# Patient Record
Sex: FEMALE | Race: WHITE | Employment: FULL TIME | URBAN - METROPOLITAN AREA
[De-identification: names, ages, dates, MRNs, and addresses within clinical notes are randomized per-mention and may not be internally consistent; named-entity substitution may affect disease eponyms.]

---

## 2017-05-30 ENCOUNTER — HOSPITAL ENCOUNTER (OUTPATIENT)
Dept: MAMMOGRAPHY | Age: 52
Discharge: HOME | End: 2017-05-30
Attending: OBSTETRICS & GYNECOLOGY
Payer: COMMERCIAL

## 2017-05-30 DIAGNOSIS — Z12.31 VISIT FOR SCREENING MAMMOGRAM: ICD-10-CM

## 2017-05-30 PROCEDURE — 77067 SCR MAMMO BI INCL CAD: CPT | Performed by: OBSTETRICS & GYNECOLOGY

## 2018-06-19 ENCOUNTER — HOSPITAL ENCOUNTER (OUTPATIENT)
Dept: MAMMOGRAPHY | Facility: HOSPITAL | Age: 53
Discharge: HOME | End: 2018-06-19
Attending: OBSTETRICS & GYNECOLOGY
Payer: COMMERCIAL

## 2018-06-19 DIAGNOSIS — N64.4 BREAST PAIN: ICD-10-CM

## 2018-06-19 PROCEDURE — 77062 BREAST TOMOSYNTHESIS BI: CPT | Performed by: OBSTETRICS & GYNECOLOGY

## 2018-06-19 PROCEDURE — 77066 DX MAMMO INCL CAD BI: CPT | Performed by: OBSTETRICS & GYNECOLOGY

## 2018-06-19 PROCEDURE — 76641 ULTRASOUND BREAST COMPLETE: CPT | Performed by: OBSTETRICS & GYNECOLOGY

## 2018-08-25 ENCOUNTER — OFFICE VISIT (OUTPATIENT)
Dept: FAMILY MEDICINE CLINIC | Facility: CLINIC | Age: 53
End: 2018-08-25
Payer: COMMERCIAL

## 2018-08-25 VITALS
OXYGEN SATURATION: 99 % | WEIGHT: 150 LBS | SYSTOLIC BLOOD PRESSURE: 94 MMHG | BODY MASS INDEX: 21.47 KG/M2 | DIASTOLIC BLOOD PRESSURE: 62 MMHG | HEART RATE: 75 BPM | RESPIRATION RATE: 14 BRPM | TEMPERATURE: 99 F | HEIGHT: 70 IN

## 2018-08-25 DIAGNOSIS — J32.0 MAXILLARY SINUSITIS, UNSPECIFIED CHRONICITY: Primary | ICD-10-CM

## 2018-08-25 PROCEDURE — 99213 OFFICE O/P EST LOW 20 MIN: CPT | Performed by: NURSE PRACTITIONER

## 2018-08-25 RX ORDER — AMOXICILLIN AND CLAVULANATE POTASSIUM 875; 125 MG/1; MG/1
1 TABLET, FILM COATED ORAL 2 TIMES DAILY
Qty: 14 TABLET | Refills: 0 | Status: SHIPPED | OUTPATIENT
Start: 2018-08-25 | End: 2018-09-01

## 2018-08-25 NOTE — PROGRESS NOTES
CHIEF COMPLAINT:   Patient presents with:  Sinus Problem      HPI:   Adelfo Akins is a 48year old female who presents with sinus symptoms for 5 weeks. Patient was treated for a sinus infection 1 month ago with a 7 day course Augmentin.  Symptoms imp Multiple Vitamins-Minerals (MULTI FOR HER) Oral Cap 1 tab daily Disp:  Rfl:    Calcium + D 600-200 MG-UNIT Oral Tab 1 TABLET TWICE DAILY WITH FOOD Disp:  Rfl:    Cholecalciferol (VITAMIN D) 1000 UNIT Oral Cap 1 CAPSULE DAILY Disp:  Rfl:       Past Medical EYES: conjunctiva clear, EOM intact, normal pupils, PERRLA  EARS: Canals are clear, TM's are pearly white and intact, no bulging, bony landmarks are visible, fluid is present behind both TMs.   NOSE: nostrils patent, purulent nasal mucus present, erythemato Sinusitis (Antibiotic Treatment)    The sinuses are air-filled spaces within the bones of the face. They connect to the inside of the nose. Sinusitis is an inflammation of the tissue that lines the sinuses. Sinusitis can occur during a cold.  It can also ha · Do not use nasal rinses or irrigation during an acute sinus infection, unless your healthcare provider tells you to. Rinsing may spread the infection to other areas in your sinuses.   · Use acetaminophen or ibuprofen to control pain, unless another pain m

## 2018-08-28 ENCOUNTER — TELEPHONE (OUTPATIENT)
Dept: FAMILY MEDICINE CLINIC | Facility: CLINIC | Age: 53
End: 2018-08-28

## 2018-08-28 RX ORDER — METHYLPREDNISOLONE 4 MG/1
TABLET ORAL
Qty: 1 KIT | Refills: 0 | Status: SHIPPED | OUTPATIENT
Start: 2018-08-28 | End: 2018-09-13 | Stop reason: ALTCHOICE

## 2018-08-28 NOTE — TELEPHONE ENCOUNTER
Returned patient call. She was recently seen for sinusitis symptoms 8/25/18 and started on augmentin. She feels she is not improving. She is also using flonase, mucinex, saline spray. No fevers.   Complains of sinus pressure facial discomfort and a  Lot

## 2018-08-31 ENCOUNTER — OFFICE VISIT (OUTPATIENT)
Dept: FAMILY MEDICINE CLINIC | Facility: CLINIC | Age: 53
End: 2018-08-31
Payer: COMMERCIAL

## 2018-08-31 VITALS
BODY MASS INDEX: 22.22 KG/M2 | DIASTOLIC BLOOD PRESSURE: 60 MMHG | RESPIRATION RATE: 18 BRPM | HEART RATE: 57 BPM | WEIGHT: 150 LBS | HEIGHT: 69 IN | SYSTOLIC BLOOD PRESSURE: 92 MMHG | TEMPERATURE: 98 F | OXYGEN SATURATION: 99 %

## 2018-08-31 DIAGNOSIS — J01.00 ACUTE NON-RECURRENT MAXILLARY SINUSITIS: Primary | ICD-10-CM

## 2018-08-31 DIAGNOSIS — J45.20 MILD INTERMITTENT ASTHMA WITHOUT COMPLICATION: ICD-10-CM

## 2018-08-31 DIAGNOSIS — J30.1 SEASONAL ALLERGIC RHINITIS DUE TO POLLEN: ICD-10-CM

## 2018-08-31 PROCEDURE — 99203 OFFICE O/P NEW LOW 30 MIN: CPT | Performed by: FAMILY MEDICINE

## 2018-08-31 RX ORDER — MOMETASONE 50 UG/1
SPRAY, METERED NASAL
Qty: 3 BOTTLE | Refills: 1 | Status: SHIPPED | OUTPATIENT
Start: 2018-08-31 | End: 2020-03-13 | Stop reason: ALTCHOICE

## 2018-08-31 RX ORDER — METHYLPREDNISOLONE 4 MG/1
TABLET ORAL
Qty: 21 TABLET | Refills: 0 | Status: SHIPPED | OUTPATIENT
Start: 2018-08-31 | End: 2018-09-13 | Stop reason: ALTCHOICE

## 2018-08-31 RX ORDER — ALBUTEROL SULFATE 90 UG/1
AEROSOL, METERED RESPIRATORY (INHALATION)
Qty: 3 INHALER | Refills: 1 | Status: SHIPPED | OUTPATIENT
Start: 2018-08-31 | End: 2020-03-10

## 2018-08-31 RX ORDER — MOMETASONE 50 UG/1
SPRAY, METERED NASAL
Qty: 3 BOTTLE | Refills: 1 | Status: SHIPPED | OUTPATIENT
Start: 2018-08-31 | End: 2018-08-31

## 2018-08-31 RX ORDER — METHYLPREDNISOLONE 4 MG/1
TABLET ORAL
Qty: 21 TABLET | Refills: 0 | Status: SHIPPED | OUTPATIENT
Start: 2018-08-31 | End: 2018-08-31

## 2018-08-31 NOTE — PROGRESS NOTES
CHIEF COMPLAINT: Patient presents with:  Establish Care  Sinus Problem: x6 weeks    HPI:     Anne Jimenez is a 48year old female presents for establish care and sinus pain L>R maxillary sinus pain x 6-8 weeks. Denies nasal obstruction.  Treated with visit):    Current Outpatient Prescriptions:  Albuterol Sulfate HFA (PROAIR HFA) 108 (90 Base) MCG/ACT Inhalation Aero Soln USE 2 INHALATIONS INTO THE LUNGS EVERY 4 HOURS AS NEEDED FOR WHEEZING (OFFICE VISIT NEEDED FOR FUTURE REFILLS) Disp: 3 Inhaler Rfl: drainage  THROAT: clear, without exudates, redness noted, cobblestoning , post nasal drip, uvula midline and airway patent  LUNGS: clear to auscultation bilaterally; no rales, rhonchi, or wheezes and good inspiratory and expiratory effort    LABS     No vi Take as directed with food and water           Health Maintenance:  Annual Physical due on 05/17/1969  Annual Depression Screen due on 05/17/1979  Pap Smear,3 Years due on 05/17/1998  Asthma Action Plan due on 06/01/2016  Asthma Control Test due on 07/30/2

## 2018-08-31 NOTE — PATIENT INSTRUCTIONS
· Rinse nasal passages with Neti pot every morning.   Follow directions to positive properly clean  · allergy home modifications discussed-shower nightly, keep windows closed, consider hepa filter for bedroom, keep pets out of bedroom, dust/vacuum bedroom 2 Keep windows closed, especially when pollen counts are high, and use air conditioning instead. Constant exposure to allergens means constant allergy symptoms. That’s why it's important to control or avoid the allergens that cause your symptoms.  If you ar People have allergies only when the pollen to which they are allergic is in the air. Each plant pollinates more or less the same from year to year.  Exactly when a plant starts to pollinate seems to depend on geographical location—rather than on the weather People with asthma have very sensitive airways. This means the airways react to certain things called triggers (such as pollen, dust, or smoke) and become swollen and narrowed. Inflammation makes the airways swollen and narrowed.  This is a long-lasting (ch · Peak flow less than 50% of your personal best, if you use peak flow monitoring  Asthma is a long-term condition. So it’s important to work with your healthcare provider to manage it. If you smoke, get help to quit.  Know your triggers and figure out how t · If you can't avoid having a pet, keep it out of your bedroom and off upholstered furniture. Pollen:  · When pollen counts are high, keep windows of your car and home closed. If possible, use an air conditioner instead.   · Wear a filter mask when mowing

## 2018-09-13 ENCOUNTER — OFFICE VISIT (OUTPATIENT)
Dept: FAMILY MEDICINE CLINIC | Facility: CLINIC | Age: 53
End: 2018-09-13
Payer: COMMERCIAL

## 2018-09-13 VITALS
HEART RATE: 68 BPM | WEIGHT: 156.19 LBS | DIASTOLIC BLOOD PRESSURE: 64 MMHG | BODY MASS INDEX: 23 KG/M2 | TEMPERATURE: 98 F | SYSTOLIC BLOOD PRESSURE: 90 MMHG | OXYGEN SATURATION: 98 % | RESPIRATION RATE: 18 BRPM

## 2018-09-13 DIAGNOSIS — J30.1 SEASONAL ALLERGIC RHINITIS DUE TO POLLEN: ICD-10-CM

## 2018-09-13 DIAGNOSIS — R14.0 ABDOMINAL BLOATING: Primary | ICD-10-CM

## 2018-09-13 DIAGNOSIS — J45.20 MILD INTERMITTENT ASTHMA WITHOUT COMPLICATION: ICD-10-CM

## 2018-09-13 PROBLEM — J34.89 SINUS PRESSURE: Status: ACTIVE | Noted: 2018-09-13

## 2018-09-13 PROCEDURE — 99214 OFFICE O/P EST MOD 30 MIN: CPT | Performed by: FAMILY MEDICINE

## 2018-09-13 RX ORDER — CETIRIZINE HYDROCHLORIDE 10 MG/1
10 TABLET ORAL DAILY
COMMUNITY
End: 2018-11-23 | Stop reason: ALTCHOICE

## 2018-09-13 NOTE — PROGRESS NOTES
CHIEF COMPLAINT: Patient presents with: Follow - Up: sinus pressure    HPI:     Violet Ramires is a 48year old female presents for follow-up sinusitis.   Completed Biaxin and second course of steroids and has no postnasal drip or runny nose or sneezi Yes      Comment: Socially    Drug use: No       Medications (Active prior to today's visit):    Current Outpatient Medications:  Budesonide (PULMICORT FLEXHALER) 180 MCG/ACT Inhalation Aerosol Powder, Breath Activated Inhale 2 puffs into the lungs 2 (two) clear drainage  THROAT: clear, without exudates, redness noted, cobblestoning , post nasal drip, uvula midline and airway patent  LUNGS: clear to auscultation bilaterally; no rales, rhonchi, or wheezes and good inspiratory and expiratory effort  Abdomen: S 2 (two) times daily.        Health Maintenance:  Annual Physical due on 05/17/1969  Annual Depression Screen due on 05/17/1979  Pap Smear,3 Years due on 05/17/1998  Asthma Action Plan due on 06/01/2016  Asthma Control Test due on 07/30/2016  Influenza Kenton Villanueva

## 2018-09-13 NOTE — PATIENT INSTRUCTIONS
Constipation   · Use milk of magnesia-cherry flavored  30cc one to two  times daily until regular BM  · Use otc citrucel 1-2x daily as directed on bottle.    · If fever, vomiting, worse abdominal or back pain, go to the emergency room immediately  · Pt Drink plenty of water when you increase the amount of fiber you eat. Follow Up  with your doctor or return to this facility if symptoms do not improve in the next few days.  You may require further tests or a referral to a specialist.  Get [facility] A Whole-grain breads and cereals. Try to eat 6–8 ounces a day. Include wheat and oat bran cereals, whole-wheat muffins or toast, and corn tortillas in your meals. Fruits. Try to eat 2 cups a day.  Apples, oranges, strawberries, pears, and bananas are good so

## 2018-11-10 ENCOUNTER — OFFICE VISIT (OUTPATIENT)
Dept: FAMILY MEDICINE CLINIC | Facility: CLINIC | Age: 53
End: 2018-11-10
Payer: COMMERCIAL

## 2018-11-10 DIAGNOSIS — J02.9 PHARYNGITIS, UNSPECIFIED ETIOLOGY: ICD-10-CM

## 2018-11-10 DIAGNOSIS — J01.00 ACUTE NON-RECURRENT MAXILLARY SINUSITIS: Primary | ICD-10-CM

## 2018-11-10 PROCEDURE — 87880 STREP A ASSAY W/OPTIC: CPT | Performed by: NURSE PRACTITIONER

## 2018-11-10 PROCEDURE — 99213 OFFICE O/P EST LOW 20 MIN: CPT | Performed by: NURSE PRACTITIONER

## 2018-11-10 RX ORDER — AMOXICILLIN AND CLAVULANATE POTASSIUM 875; 125 MG/1; MG/1
1 TABLET, FILM COATED ORAL 2 TIMES DAILY
Qty: 20 TABLET | Refills: 0 | Status: SHIPPED | OUTPATIENT
Start: 2018-11-10 | End: 2018-11-20

## 2018-11-10 NOTE — PATIENT INSTRUCTIONS
Cepacol lozenges with Benzocaine 15 mg + Methol can numb your throat temporarily. Sinusitis (Antibiotic Treatment)    The sinuses are air-filled spaces within the bones of the face.  They connect to the inside of the nose. Sinusitis is an inflammation · Over-the-counter antihistamines may help if allergies contributed to your sinusitis.    · Do not use nasal rinses or irrigation during an acute sinus infection, unless your healthcare provider tells you to.  Rinsing may spread the infection to other areas Sore throats happen for many reasons, such as colds, allergies, and infections caused by viruses or bacteria. In any case, your throat becomes red and sore.  Your goal for self-care is to reduce your discomfort while giving your throat a chance to heal.  Mo · A temperature over 101°F (38.3°C)  · White spots on the throat  · Great difficulty swallowing  · Trouble breathing  · A skin rash  · Recent exposure to someone else with strep bacteria  · Severe hoarseness and swollen glands in the neck or jaw   Date Las · Remember: unless a sore throat is caused by a bacterial infection, antibiotics won’t help you. Prevent future sore throats  Prevention tips include the following:  · Stop smoking or reduce contact with secondhand smoke.  Smoke irritates the tender throat

## 2018-11-12 VITALS
SYSTOLIC BLOOD PRESSURE: 108 MMHG | HEART RATE: 84 BPM | TEMPERATURE: 99 F | RESPIRATION RATE: 16 BRPM | DIASTOLIC BLOOD PRESSURE: 68 MMHG

## 2018-11-12 NOTE — PROGRESS NOTES
CHIEF COMPLAINT:   \" Patient presents with:  Sore Throat  Sinus Problem    HPI:   Jorgito Renee is a 48year old female who presents with a hx of a sore throat on and off for 2 weeks and cold sx which progressed to Frontal and Maxillary sinus conges vein treatment   • TONSILLECTOMY        Family History   Problem Relation Age of Onset   • Breast Cancer Paternal Aunt 48   • Diabetes Maternal Grandmother    • Colon Cancer Maternal Grandmother    • Hypertension Father    • Prostate Cancer Father 1. Acute non-recurrent maxillary sinusitis    - Amoxicillin-Pot Clavulanate 875-125 MG Oral Tab; Take 1 tablet by mouth 2 (two) times daily for 10 days. Dispense: 20 tablet; Refill: 0    2.  Pharyngitis, unspecified etiology    Patient wanted to be [place]

## 2018-11-23 ENCOUNTER — OFFICE VISIT (OUTPATIENT)
Dept: FAMILY MEDICINE CLINIC | Facility: CLINIC | Age: 53
End: 2018-11-23
Payer: COMMERCIAL

## 2018-11-23 VITALS
TEMPERATURE: 98 F | OXYGEN SATURATION: 99 % | HEART RATE: 68 BPM | SYSTOLIC BLOOD PRESSURE: 108 MMHG | RESPIRATION RATE: 16 BRPM | DIASTOLIC BLOOD PRESSURE: 70 MMHG

## 2018-11-23 DIAGNOSIS — B02.9 HERPES ZOSTER WITHOUT COMPLICATION: Primary | ICD-10-CM

## 2018-11-23 PROCEDURE — 99213 OFFICE O/P EST LOW 20 MIN: CPT | Performed by: PHYSICIAN ASSISTANT

## 2018-11-23 RX ORDER — FEXOFENADINE HCL 180 MG/1
180 TABLET ORAL DAILY
COMMUNITY
End: 2021-05-14

## 2018-11-23 RX ORDER — VALACYCLOVIR HYDROCHLORIDE 1 G/1
1 TABLET, FILM COATED ORAL 3 TIMES DAILY
Qty: 21 TABLET | Refills: 0 | Status: SHIPPED | OUTPATIENT
Start: 2018-11-23 | End: 2018-11-30

## 2018-11-23 NOTE — PROGRESS NOTES
CHIEF COMPLAINT:   Patient presents with:  Rash: on belly. HPI:     Luis Pearson is a 48year old female who presents for evaluation of a rash. She noted a rash above her belly button area about a week ago.   She says the skin feels irrita Norbert's node 2005    neck      Past Surgical History:   Procedure Laterality Date   • DENTAL SURGERY PROCEDURE  11/1/08    Implants   • OTHER      vein treatment   • TONSILLECTOMY        Family History   Problem Relation Age of Onset   • Breast Cancer Pa Rash in the midline superior to umbilicus. Similar rash left mid flank. Lesions non vesicular and dry without drainage. No right sided lesions. Skin is sensitive to light touch along left T9 dermatome.        No results found for this or any previous

## 2019-01-20 ENCOUNTER — PATIENT OUTREACH (OUTPATIENT)
Dept: FAMILY MEDICINE CLINIC | Facility: CLINIC | Age: 54
End: 2019-01-20

## 2019-07-18 ENCOUNTER — HOSPITAL ENCOUNTER (OUTPATIENT)
Dept: MAMMOGRAPHY | Facility: HOSPITAL | Age: 54
Discharge: HOME | End: 2019-07-18
Attending: OBSTETRICS & GYNECOLOGY
Payer: COMMERCIAL

## 2019-07-18 DIAGNOSIS — N64.4 BREAST PAIN, LEFT: ICD-10-CM

## 2019-07-18 DIAGNOSIS — R92.2 DENSE BREASTS: ICD-10-CM

## 2019-07-18 PROCEDURE — 77062 BREAST TOMOSYNTHESIS BI: CPT | Performed by: OBSTETRICS & GYNECOLOGY

## 2019-07-18 PROCEDURE — 76642 ULTRASOUND BREAST LIMITED: CPT | Performed by: OBSTETRICS & GYNECOLOGY

## 2019-07-18 PROCEDURE — 77066 DX MAMMO INCL CAD BI: CPT | Performed by: OBSTETRICS & GYNECOLOGY

## 2019-09-07 ENCOUNTER — OFFICE VISIT (OUTPATIENT)
Dept: FAMILY MEDICINE CLINIC | Facility: CLINIC | Age: 54
End: 2019-09-07
Payer: COMMERCIAL

## 2019-09-07 VITALS
RESPIRATION RATE: 16 BRPM | WEIGHT: 152 LBS | HEART RATE: 71 BPM | OXYGEN SATURATION: 99 % | BODY MASS INDEX: 21.76 KG/M2 | HEIGHT: 70 IN | SYSTOLIC BLOOD PRESSURE: 100 MMHG | DIASTOLIC BLOOD PRESSURE: 78 MMHG | TEMPERATURE: 98 F

## 2019-09-07 DIAGNOSIS — J01.40 ACUTE NON-RECURRENT PANSINUSITIS: Primary | ICD-10-CM

## 2019-09-07 PROCEDURE — 99213 OFFICE O/P EST LOW 20 MIN: CPT | Performed by: PHYSICIAN ASSISTANT

## 2019-09-07 RX ORDER — AMOXICILLIN AND CLAVULANATE POTASSIUM 875; 125 MG/1; MG/1
1 TABLET, FILM COATED ORAL 2 TIMES DAILY
Qty: 20 TABLET | Refills: 0 | Status: SHIPPED | OUTPATIENT
Start: 2019-09-07 | End: 2019-11-04 | Stop reason: ALTCHOICE

## 2019-09-07 RX ORDER — METHYLPREDNISOLONE 4 MG/1
TABLET ORAL
Qty: 1 KIT | Refills: 0 | Status: SHIPPED | OUTPATIENT
Start: 2019-09-07 | End: 2019-11-04 | Stop reason: ALTCHOICE

## 2019-09-07 RX ORDER — FLUTICASONE PROPIONATE 50 MCG
2 SPRAY, SUSPENSION (ML) NASAL DAILY
Qty: 1 INHALER | Refills: 0 | Status: SHIPPED | OUTPATIENT
Start: 2019-09-07 | End: 2020-10-30

## 2019-09-07 NOTE — PROGRESS NOTES
CHIEF COMPLAINT:   Patient presents with:  Sinusitis      HPI:   Tanya Blackwood is a 48year old female who presents for cold symptoms for  7-8  days.  She felt as thought she was improving and then suddenly since yesterday developed worsening sinus sy • Schmorl's node 2005    neck      Past Surgical History:   Procedure Laterality Date   • DENTAL SURGERY PROCEDURE  11/1/08    Implants   • OTHER      vein treatment   • TONSILLECTOMY        Family History   Problem Relation Age of Onset   • Breast Cancer LYMPH:  No gross lymphadenopathy. No results found for this or any previous visit (from the past 24 hour(s)). ASSESSMENT AND PLAN:   Sotero Bruno is a 48year old female who presents with Sinusitis.  Symptoms are consistent with:      ASSESSM The symptoms of ABRS may be different for each person, and can include:  · Nasal congestion  · Runny nose  · Fluid draining from the nose down the throat (postnasal drip)  · Headache  · Cough  · Pain in the sinuses  · Thick, colored fluid from the nose (mu The patient indicates understanding of these issues and agrees to the plan. The patient is asked to follow up with PCP if sx's persist or worsen.

## 2019-09-29 ENCOUNTER — TELEPHONE (OUTPATIENT)
Dept: FAMILY MEDICINE CLINIC | Facility: CLINIC | Age: 54
End: 2019-09-29

## 2019-09-29 NOTE — TELEPHONE ENCOUNTER
Patient call inquiring about allergy medications. Seen for sinus issues 9/7/19 and better after augmentin/medrol but still underlying allergies and sinus symptoms. Taking allegra, flonase currently.   Advise trying allegra d rather than plain allegra for

## 2019-10-21 ENCOUNTER — IMMUNIZATION (OUTPATIENT)
Dept: FAMILY MEDICINE CLINIC | Facility: CLINIC | Age: 54
End: 2019-10-21
Payer: COMMERCIAL

## 2019-10-21 DIAGNOSIS — Z23 NEED FOR VACCINATION: ICD-10-CM

## 2019-10-21 PROCEDURE — 90471 IMMUNIZATION ADMIN: CPT | Performed by: PHYSICIAN ASSISTANT

## 2019-10-21 PROCEDURE — 90686 IIV4 VACC NO PRSV 0.5 ML IM: CPT | Performed by: PHYSICIAN ASSISTANT

## 2019-11-04 ENCOUNTER — APPOINTMENT (OUTPATIENT)
Dept: GENERAL RADIOLOGY | Facility: HOSPITAL | Age: 54
End: 2019-11-04
Attending: EMERGENCY MEDICINE
Payer: COMMERCIAL

## 2019-11-04 ENCOUNTER — HOSPITAL ENCOUNTER (EMERGENCY)
Facility: HOSPITAL | Age: 54
Discharge: HOME | End: 2019-11-04
Attending: EMERGENCY MEDICINE
Payer: COMMERCIAL

## 2019-11-04 VITALS
HEIGHT: 70 IN | RESPIRATION RATE: 18 BRPM | BODY MASS INDEX: 21.19 KG/M2 | OXYGEN SATURATION: 96 % | TEMPERATURE: 98 F | SYSTOLIC BLOOD PRESSURE: 126 MMHG | DIASTOLIC BLOOD PRESSURE: 65 MMHG | WEIGHT: 148 LBS | HEART RATE: 79 BPM

## 2019-11-04 DIAGNOSIS — S63.259A DISLOCATION OF FINGER, INITIAL ENCOUNTER: Primary | ICD-10-CM

## 2019-11-04 PROCEDURE — 99283 EMERGENCY DEPT VISIT LOW MDM: CPT

## 2019-11-04 PROCEDURE — 26770 TREAT FINGER DISLOCATION: CPT

## 2019-11-04 PROCEDURE — 73130 X-RAY EXAM OF HAND: CPT | Performed by: EMERGENCY MEDICINE

## 2019-11-04 PROCEDURE — 73140 X-RAY EXAM OF FINGER(S): CPT | Performed by: EMERGENCY MEDICINE

## 2019-11-04 RX ORDER — ACETAMINOPHEN 500 MG
1000 TABLET ORAL ONCE
Status: COMPLETED | OUTPATIENT
Start: 2019-11-04 | End: 2019-11-04

## 2019-11-04 NOTE — ED NOTES
Ice pack applied upon arrival. Pt was able to get her rings removed from that finger on her own upon arrival.

## 2019-11-04 NOTE — ED INITIAL ASSESSMENT (HPI)
Pt had leash pull on her ring finger, distal tip is deformed. 5/10 pain. No bleeding noted.  Capillary refill<3 sec

## 2019-11-04 NOTE — ED PROVIDER NOTES
Patient Seen in: [facility] Emergency Department      History   Patient presents with:  Upper Extremity Injury (musculoskeletal)    Stated Complaint: finger injury    HPI    Patient is a 26-year-old right-hand-dominant female who presents emergency is nontoxic in appearance. Chivo Puffer EXTREMITIES: There is no cyanosis, clubbing, or edema appreciated. Pulses are 2+ and equal in both upper extremities. There is tenderness and deformity over the right hand fourth digit in the DIP joint only.   There is no other DO  [address] [facility]  [phone]    Call in 2 days  please call for follow up this week        Medications Prescribed:  Current Discharge Medication List

## 2019-12-20 ENCOUNTER — OFFICE VISIT (OUTPATIENT)
Dept: FAMILY MEDICINE CLINIC | Facility: CLINIC | Age: 54
End: 2019-12-20
Payer: COMMERCIAL

## 2019-12-20 VITALS
WEIGHT: 147.63 LBS | HEIGHT: 69 IN | BODY MASS INDEX: 21.86 KG/M2 | SYSTOLIC BLOOD PRESSURE: 102 MMHG | DIASTOLIC BLOOD PRESSURE: 64 MMHG | RESPIRATION RATE: 18 BRPM | HEART RATE: 65 BPM | TEMPERATURE: 98 F | OXYGEN SATURATION: 99 %

## 2019-12-20 DIAGNOSIS — J01.00 ACUTE MAXILLARY SINUSITIS, RECURRENCE NOT SPECIFIED: Primary | ICD-10-CM

## 2019-12-20 PROCEDURE — 99214 OFFICE O/P EST MOD 30 MIN: CPT | Performed by: FAMILY MEDICINE

## 2019-12-20 RX ORDER — CETIRIZINE HYDROCHLORIDE 10 MG/1
TABLET ORAL
COMMUNITY
End: 2020-07-21 | Stop reason: ALTCHOICE

## 2019-12-20 RX ORDER — METHYLPREDNISOLONE 4 MG/1
TABLET ORAL
Qty: 1 KIT | Refills: 0 | Status: SHIPPED | OUTPATIENT
Start: 2019-12-20 | End: 2020-03-13 | Stop reason: ALTCHOICE

## 2019-12-20 RX ORDER — AMOXICILLIN AND CLAVULANATE POTASSIUM 875; 125 MG/1; MG/1
1 TABLET, FILM COATED ORAL 2 TIMES DAILY
Qty: 14 TABLET | Refills: 0 | Status: SHIPPED | OUTPATIENT
Start: 2019-12-20 | End: 2019-12-27

## 2019-12-20 NOTE — PROGRESS NOTES
CHIEF COMPLAINT: Patient presents with:  Sinus Problem: face pressure, cough, drainage        HPI:     Anne Jimenez is a 48year old female presents for sinus problems.     Lisa Rodriguez is a 47 yo F with PMH of asthma p/w cough and sinus problems x 7 days No       Medications (Active prior to today's visit):  Current Outpatient Medications   Medication Sig Dispense Refill   • cetirizine 10 MG Oral Tab Take by mouth. • methylPREDNISolone (MEDROL) 4 MG Oral Tablet Therapy Pack As directed.  1 kit 0   • [place] °C) (Oral)   Resp 18   Ht 69\"   Wt 147 lb 9.6 oz (67 kg)   SpO2 99%   BMI 21.80 kg/m²   Vital signs reviewed. Appears stated age, well groomed. Physical Exam   Vitals reviewed. Constitutional: She is oriented to person, place, and time.  She appears well Dr. Marzena Keller.     - methylPREDNISolone (MEDROL) 4 MG Oral Tablet Therapy Pack; As directed. Dispense: 1 kit; Refill: 0  - Amoxicillin-Pot Clavulanate 875-125 MG Oral Tab; Take 1 tablet by mouth 2 (two) times daily for 7 days. Dispense: 14 tablet;  Refill:

## 2019-12-20 NOTE — PATIENT INSTRUCTIONS
As of October 6th 2014, the Drug Enforcement Agency [facility]) is reclassifying all hydrocodone combination medications from Schedule III to Schedule II. This includes medications such as Norco, Vicodin, Lortab, Zohydro, and Vicoprofen.      What this means for often be managed with self-care. Self-care can keep sinuses moist and make you feel more comfortable. Remember to follow your doctor's instructions closely. This can make a big difference in getting your sinus problem under control.   Drink fluids  Drinking

## 2020-03-10 RX ORDER — BUDESONIDE 180 UG/1
AEROSOL, POWDER RESPIRATORY (INHALATION)
Qty: 1 EACH | Refills: 11 | OUTPATIENT
Start: 2020-03-10

## 2020-03-10 RX ORDER — ALBUTEROL SULFATE 90 UG/1
AEROSOL, METERED RESPIRATORY (INHALATION)
Qty: 3 INHALER | Refills: 0 | Status: SHIPPED | OUTPATIENT
Start: 2020-03-10 | End: 2020-03-13

## 2020-03-10 NOTE — TELEPHONE ENCOUNTER
Patient returning call. Patient schedule appt for Friday 3/13/2020. Advised patient 90 day refill approved for Pulmicort and albuterol inhaler as a one-time exception only. Patient is due for labs/testing and/or appointment.  I explained to patient that

## 2020-03-10 NOTE — TELEPHONE ENCOUNTER
Pt requesting refill of   Requested Prescriptions     Pending Prescriptions Disp Refills   • PULMICORT FLEXHALER 180 MCG/ACT Inhalation Aerosol Powder, Breath Activated [Pharmacy Med Name: Ashwin Campbell PWD/INH 180MCG] 1 each 11     Sig: USE 2 INHALA

## 2020-03-13 ENCOUNTER — OFFICE VISIT (OUTPATIENT)
Dept: FAMILY MEDICINE CLINIC | Facility: CLINIC | Age: 55
End: 2020-03-13
Payer: COMMERCIAL

## 2020-03-13 VITALS
SYSTOLIC BLOOD PRESSURE: 100 MMHG | BODY MASS INDEX: 21.92 KG/M2 | WEIGHT: 148 LBS | DIASTOLIC BLOOD PRESSURE: 66 MMHG | HEART RATE: 61 BPM | RESPIRATION RATE: 18 BRPM | HEIGHT: 69 IN | TEMPERATURE: 98 F | OXYGEN SATURATION: 97 %

## 2020-03-13 DIAGNOSIS — Z13.0 SCREENING FOR ENDOCRINE, NUTRITIONAL, METABOLIC AND IMMUNITY DISORDER: ICD-10-CM

## 2020-03-13 DIAGNOSIS — Z13.6 SCREENING FOR HEART DISEASE: ICD-10-CM

## 2020-03-13 DIAGNOSIS — Z13.21 SCREENING FOR ENDOCRINE, NUTRITIONAL, METABOLIC AND IMMUNITY DISORDER: ICD-10-CM

## 2020-03-13 DIAGNOSIS — Z13.0 SCREENING, IRON DEFICIENCY ANEMIA: ICD-10-CM

## 2020-03-13 DIAGNOSIS — J45.20 MILD INTERMITTENT ASTHMA WITHOUT COMPLICATION: ICD-10-CM

## 2020-03-13 DIAGNOSIS — Z13.29 SCREENING FOR ENDOCRINE, NUTRITIONAL, METABOLIC AND IMMUNITY DISORDER: ICD-10-CM

## 2020-03-13 DIAGNOSIS — Z12.39 SCREENING FOR BREAST CANCER: ICD-10-CM

## 2020-03-13 DIAGNOSIS — Z13.228 SCREENING FOR ENDOCRINE, NUTRITIONAL, METABOLIC AND IMMUNITY DISORDER: ICD-10-CM

## 2020-03-13 DIAGNOSIS — Z00.00 ANNUAL PHYSICAL EXAM: Primary | ICD-10-CM

## 2020-03-13 PROCEDURE — 99396 PREV VISIT EST AGE 40-64: CPT | Performed by: FAMILY MEDICINE

## 2020-03-13 NOTE — PATIENT INSTRUCTIONS
Perform labs fasting 8 hours with water or black coffee or or black tea diet  soda only prior to exam.    -Encourage healthy diet of whole food and avoid processed food and sugary drinks and sodas.   Diet should include lean meats and vegetables including 5 make sure you’re up to date on what you need.   Screening Who needs it How often   Type 2 diabetes or prediabetes All women beginning at age 39 and women without symptoms at any age who are overweight or obese and have 1 or more additional risk factors for you.  Some people should be screened using a different schedule because of their personal or family health history. Talk with your healthcare provider about your health history.    Depression All women in this age group At routine exams   Gonorrhea Sexually year   Measles, mumps, rubella (MMR) Women in this age group through their late 46s who have no record of these infections or vaccines 1 dose   Meningococcal Women at increased risk for infection – talk with your healthcare provider 1 or more doses   Pneum manage your asthma and improve your quality of life. You will need to work with your healthcare provider to develop a plan. But it’s up to you to put this plan into action. Why you need to take control  You need to control the inflammation in your lungs. of your heart. If you’re thinking about exercise, you’re on the right track. You don’t need to become an athlete, but you do need a certain amount of brisk exercise to help strengthen your heart.  If you have been diagnosed with a heart condition, your doct your doctor if you:  · Have chest pain or feel dizzy or lightheaded  · Feel burning, tightness, pressure, or heaviness in your chest, neck, shoulders, back, or arms  · Have unusual shortness of breath  · Have increased joint or muscle pain  · Have palpitat eating fewer processed foods are two great ways to decrease the amount of salt you consume. · Managing calories. A calorie is a unit of energy.  Your body burns calories for fuel, but if you eat more calories than your body burns, the extras are stored as if you plan to eat two servings, double all the numbers on the label. Prepare food right  A key part of healthy cooking is cutting down on added fat and salt. Look on the internet for lower-fat, lower-sodium recipes.  Also, try these tips:  · Remove fat fr a woman stops having monthly periods. After menopause, the body makes less estrogen (female hormone). This increases bone loss. At this point, treatment may be needed to reduce the risk for fracture.  Exercise and calcium can also help keep your bones stron with calcium sulfate   204 mg/3 oz. Low-fat milk   297 mg/1 cup   Soybeans, fresh, boiled   131 mg/1/2 cup   Collards   179 mg/1/2 cup   Swiss cheese   272 mg/1 oz.    White beans, cooked   81 mg/1/2 cup   English muffin, whole wheat   175 mg/1 muffin   C provider wants to check your vitamin D levels to find out if you have any risks to bone health.  These might be:  · Low calcium  · Soft bones caused by low vitamin D or problems using it (osteomalacia)  · Osteopenia  · Osteoporosis  · Rickets, in children used to draw blood from a vein in your arm or hand. Does this test pose any risks? Having a blood test with a needle carries some risks. These include bleeding, infection, bruising, and feeling lightheaded.  When the needle pricks your arm or hand, you m medicines, such as cortisone, increase bone loss. They also decrease bone growth. Ask your healthcare provider about any side effects of your medicines, and how to prevent them. · Protein-rich or salty foods.  Eaten in large amounts, these foods may deplet 5/1/2018  © 6901-2426 [employer] [address] [address] [facility] [place] [facility] [place]. All rights reserved. This information is not intended as a substitute for professional medical care. Always follow your healthcare professional's instructions.

## 2020-03-13 NOTE — PROGRESS NOTES
REASON FOR VISIT:    Adonay Harmon is a 48year old female who presents for an [address]. The patient presents for recheck of asthma sx's. Lately the patient's asthma has been  under good control.  When symptoms occur they are de Allergic rhinitis, cause unspecified     Acute non-recurrent maxillary sinusitis     Lumbago     Cervicalgia     Degeneration of cervical intervertebral disc     Mild intermittent asthma without complication     Abdominal bloating     Sinus pressure Have you had any immunizations at another office such as Influenza, Hepatitis B, Tetanus, or Pneumococcal?: No    Domestic Abuse: No     CAGE:     Cut : No    Annoyed : No    Guilty : No    Eye Opener : No    Scoring  Total Score: 0     Depression Screenin Hepatitis C Screening Screen those at high risk plus screen one time for adults born 1943-1 965 No results found for: HCVAB    Tuberculosis Screen if high risk No components found for: PPDINDURAT      Disease Monitoring:    SPECIFIC DISEASE MONITORING Inte MEDICAL INFORMATION:   Past Medical History:   Diagnosis Date   • Allergic rhinitis due to pollen 9/28/2011    Skin tests 2013- allergic to mold, ragweed, grass, cats, pollen   • Asthma     allergy/exercise-induced   • Bell's palsy 1999   • Easy bruising /66 (BP Location: Left arm, Patient Position: Sitting, Cuff Size: adult)   Pulse 61   Temp 98.2 °F (36.8 °C) (Oral)   Resp 18   Ht 69\"   Wt 148 lb (67.1 kg)   LMP 03/05/2020 (Exact Date)   SpO2 97%   BMI 21.86 kg/m²    Patient's last menstrual perio -encouraged to continue not smoking  -safe sex practices - recommend condom use  -mammogram order placed- if age 38y or older, recommend annual  -Colonoscopy due 5/3/2021  -self breast exams encouraged monthly  -immunizations- UTD- recommend second MMR- li • Influenza 10/20/2006, 10/22/2006, 12/10/2008, 10/02/2009, 10/15/2010, 10/26/2011, 09/06/2017   • TDAP 10/29/2014   • Varicella Deferred (Had Chicken Pox) 05/18/1976       Influenza Annually   Pneumococcal if high risk   Td/Tdap once then every 10 years

## 2020-03-14 LAB
ABSOLUTE BASOPHILS: 63 CELLS/UL (ref 0–200)
ABSOLUTE EOSINOPHILS: 101 CELLS/UL (ref 15–500)
ABSOLUTE LYMPHOCYTES: 1474 CELLS/UL (ref 850–3900)
ABSOLUTE MONOCYTES: 743 CELLS/UL (ref 200–950)
ABSOLUTE NEUTROPHILS: 3919 CELLS/UL (ref 1500–7800)
ALBUMIN/GLOBULIN RATIO: 1.5 (CALC) (ref 1–2.5)
ALBUMIN: 4.4 G/DL (ref 3.6–5.1)
ALKALINE PHOSPHATASE: 39 U/L (ref 37–153)
ALT: 10 U/L (ref 6–29)
AST: 18 U/L (ref 10–35)
BASOPHILS: 1 %
BILIRUBIN, TOTAL: 0.9 MG/DL (ref 0.2–1.2)
BUN: 15 MG/DL (ref 7–25)
CALCIUM: 9.4 MG/DL (ref 8.6–10.4)
CARBON DIOXIDE: 29 MMOL/L (ref 20–32)
CHLORIDE: 103 MMOL/L (ref 98–110)
CHOL/HDLC RATIO: 2.6 (CALC)
CHOLESTEROL, TOTAL: 198 MG/DL
CREATININE: 0.69 MG/DL (ref 0.5–1.05)
EGFR IF AFRICN AM: 116 ML/MIN/1.73M2
EGFR IF NONAFRICN AM: 100 ML/MIN/1.73M2
EOSINOPHILS: 1.6 %
GLOBULIN: 2.9 G/DL (CALC) (ref 1.9–3.7)
GLUCOSE: 101 MG/DL (ref 65–99)
HDL CHOLESTEROL: 76 MG/DL
HEMATOCRIT: 39 % (ref 35–45)
HEMOGLOBIN: 13.6 G/DL (ref 11.7–15.5)
LDL-CHOLESTEROL: 109 MG/DL (CALC)
LYMPHOCYTES: 23.4 %
MCH: 32.7 PG (ref 27–33)
MCHC: 34.9 G/DL (ref 32–36)
MCV: 93.8 FL (ref 80–100)
MONOCYTES: 11.8 %
MPV: 11.3 FL (ref 7.5–12.5)
NEUTROPHILS: 62.2 %
NON-HDL CHOLESTEROL: 122 MG/DL (CALC)
PLATELET COUNT: 207 THOUSAND/UL (ref 140–400)
POTASSIUM: 4 MMOL/L (ref 3.5–5.3)
PROTEIN, TOTAL: 7.3 G/DL (ref 6.1–8.1)
RDW: 11.4 % (ref 11–15)
RED BLOOD CELL COUNT: 4.16 MILLION/UL (ref 3.8–5.1)
SODIUM: 138 MMOL/L (ref 135–146)
TRIGLYCERIDES: 51 MG/DL
TSH W/REFLEX TO FT4: 1.04 MIU/L
WHITE BLOOD CELL COUNT: 6.3 THOUSAND/UL (ref 3.8–10.8)

## 2020-03-15 ENCOUNTER — MOBILE ENCOUNTER (OUTPATIENT)
Dept: FAMILY MEDICINE CLINIC | Facility: CLINIC | Age: 55
End: 2020-03-15

## 2020-03-15 PROBLEM — R73.9 BLOOD GLUCOSE ELEVATED: Status: ACTIVE | Noted: 2020-03-15

## 2020-03-15 RX ORDER — PREDNISONE 20 MG/1
TABLET ORAL
Qty: 15 TABLET | Refills: 0 | Status: SHIPPED | OUTPATIENT
Start: 2020-03-15 | End: 2020-07-21 | Stop reason: ALTCHOICE

## 2020-03-19 ENCOUNTER — TELEPHONE (OUTPATIENT)
Dept: FAMILY MEDICINE CLINIC | Facility: CLINIC | Age: 55
End: 2020-03-19

## 2020-03-19 DIAGNOSIS — J30.89 SEASONAL ALLERGIC RHINITIS DUE TO OTHER ALLERGIC TRIGGER: ICD-10-CM

## 2020-03-19 DIAGNOSIS — R07.89 CHEST TIGHTNESS: ICD-10-CM

## 2020-03-19 PROCEDURE — 99443 PHONE E/M BY PHYS 21-30 MIN: CPT | Performed by: FAMILY MEDICINE

## 2020-03-19 RX ORDER — ALBUTEROL SULFATE 90 UG/1
2 AEROSOL, METERED RESPIRATORY (INHALATION) EVERY 6 HOURS PRN
Qty: 3 INHALER | Refills: 1 | Status: SHIPPED | OUTPATIENT
Start: 2020-03-19 | End: 2020-03-19

## 2020-03-19 RX ORDER — ALPRAZOLAM 0.5 MG/1
0.25 TABLET ORAL NIGHTLY PRN
Qty: 10 TABLET | Refills: 0 | Status: SHIPPED | OUTPATIENT
Start: 2020-03-19 | End: 2020-07-21 | Stop reason: ALTCHOICE

## 2020-03-19 RX ORDER — ALBUTEROL SULFATE 90 UG/1
2 AEROSOL, METERED RESPIRATORY (INHALATION) EVERY 4 HOURS PRN
Qty: 1 INHALER | Refills: 3 | Status: SHIPPED | OUTPATIENT
Start: 2020-03-19 | End: 2020-10-30

## 2020-03-19 NOTE — TELEPHONE ENCOUNTER
Patient reports continued chest tightness  Received prednisone 3/15/2020, today was last day of prednisone- did initially feel relief  Using inhaler regular and rescue ( does not notice a difference when using albuterol)      Not wheezing at this time  [facility]

## 2020-03-19 NOTE — TELEPHONE ENCOUNTER
Virtual/Telephone Check-In    [address] verbally consents to a Virtual/Telephone Check-In service on 03/19/20. Patient understands and accepts financial responsibility for any deductible, co-insurance and/or co-pays associated with this service. improvement with prednisone 40 mg after 4 days. Unusual first day would improve chest tightness then returned. She has no other asthma symptoms such as wheezing or shortness of breath or consistent cough. Continue Pulmicort flex inhaler.    Risks and b

## 2020-03-19 NOTE — TELEPHONE ENCOUNTER
Janelle Locus, with [facility] called stating there is a prescription Pro air inhaler they want to discuss an issue please call back at [phone]. Reference # [id number].

## 2020-03-19 NOTE — TELEPHONE ENCOUNTER
Pharmacy called [facility] to confirm albuterol change on 3/13/2020    Albuterol HFA fill ed 3/10/2020      Please confirm if patient is switched to [address] [place] [address] from Albuterol HFA IN ov 3/13/2020

## 2020-07-08 ENCOUNTER — TELEPHONE (OUTPATIENT)
Dept: FAMILY MEDICINE CLINIC | Facility: CLINIC | Age: 55
End: 2020-07-08

## 2020-07-08 DIAGNOSIS — N63.10 LUMP OF RIGHT BREAST: Primary | ICD-10-CM

## 2020-07-08 NOTE — TELEPHONE ENCOUNTER
Patient notified of message below. Patient verbalized understanding. She states she will just keep appt today with ObGyn, since already scheduled. She will keep us updated on any findings.

## 2020-07-08 NOTE — TELEPHONE ENCOUNTER
Patient needs to be evaluated in person with a breast mass. I can gladly sign the order but this is standard because of the exam is negative and the mass is still there she may need biopsy of the area.   Can have false negatives on mammogram.  If she is no

## 2020-07-08 NOTE — TELEPHONE ENCOUNTER
Patient has upcoming screening mammogram, would like order changed to diagnostic. Patient noticed lump in right breast a few days ago. She states it is very tender to the touch, she estimates size at approx 0.5  Inches.  Denies redness, warmth or swelling

## 2020-07-08 NOTE — TELEPHONE ENCOUNTER
Patient calling for status. She has spoken to her Judit Garza who has agreed to place order, but will need to see patient. She made appt for 1630 today, but will cancel if we are able to order for her.      Vijay Bruner, DO Order pended for approval, if approp

## 2020-07-09 ENCOUNTER — HOSPITAL ENCOUNTER (OUTPATIENT)
Dept: MAMMOGRAPHY | Facility: HOSPITAL | Age: 55
Discharge: HOME | End: 2020-07-09
Attending: OBSTETRICS & GYNECOLOGY
Payer: COMMERCIAL

## 2020-07-09 DIAGNOSIS — N63.10 BREAST MASS, RIGHT: ICD-10-CM

## 2020-07-09 PROCEDURE — 76642 ULTRASOUND BREAST LIMITED: CPT | Performed by: OBSTETRICS & GYNECOLOGY

## 2020-07-09 PROCEDURE — 77062 BREAST TOMOSYNTHESIS BI: CPT | Performed by: OBSTETRICS & GYNECOLOGY

## 2020-07-09 PROCEDURE — 77066 DX MAMMO INCL CAD BI: CPT | Performed by: OBSTETRICS & GYNECOLOGY

## 2020-07-09 NOTE — IMAGING NOTE
This Breast Care RN assisted Dr. Sae Watt with recommendation for a right breast ultrasound guided biopsy for nodule. Procedure reviewed and all questions answered. Emotional and educational support given.    On the day of the biopsy, pt instructed to take T

## 2020-07-15 ENCOUNTER — HOSPITAL ENCOUNTER (OUTPATIENT)
Dept: MAMMOGRAPHY | Facility: HOSPITAL | Age: 55
Discharge: HOME | End: 2020-07-15
Attending: OBSTETRICS & GYNECOLOGY
Payer: COMMERCIAL

## 2020-07-15 DIAGNOSIS — N63.10 BREAST MASS, RIGHT: ICD-10-CM

## 2020-07-15 PROCEDURE — 88360 TUMOR IMMUNOHISTOCHEM/MANUAL: CPT | Performed by: OBSTETRICS & GYNECOLOGY

## 2020-07-15 PROCEDURE — 77065 DX MAMMO INCL CAD UNI: CPT | Performed by: OBSTETRICS & GYNECOLOGY

## 2020-07-15 PROCEDURE — 88305 TISSUE EXAM BY PATHOLOGIST: CPT | Performed by: OBSTETRICS & GYNECOLOGY

## 2020-07-15 PROCEDURE — 19083 BX BREAST 1ST LESION US IMAG: CPT | Performed by: OBSTETRICS & GYNECOLOGY

## 2020-07-17 ENCOUNTER — TELEPHONE (OUTPATIENT)
Dept: CT IMAGING | Facility: HOSPITAL | Age: 55
End: 2020-07-17

## 2020-07-17 NOTE — TELEPHONE ENCOUNTER
Results given to [facility] at Dr. Antony Lopez office. surg consult:  Dr. Altaf Mariscal. Olu Puentes, breast program navigator to check appts. LM on VM.

## 2020-07-20 ENCOUNTER — TELEPHONE (OUTPATIENT)
Dept: CT IMAGING | Facility: HOSPITAL | Age: 55
End: 2020-07-20

## 2020-07-20 ENCOUNTER — NURSE NAVIGATOR ENCOUNTER (OUTPATIENT)
Dept: HEMATOLOGY/ONCOLOGY | Facility: HOSPITAL | Age: 55
End: 2020-07-20

## 2020-07-20 NOTE — PROGRESS NOTES
Phoned patient and discussed newly diagnosed breast cancer, she put her , Layo on speaker phone. Introduced myself and explained my role as the breast navigator.  Explained the role of the physicians on her breast cancer care team. Reviewed over patho

## 2020-07-20 NOTE — TELEPHONE ENCOUNTER
Telephoned Yahaira Agrawal and name,  verified with patient. Notified Eliud Evans of malignant biopsy result. Concordance verified by radiologist, Dr. Derian Armstrong. Eliud Evans reports biopsy site is healing well.   Radiologist recommends s

## 2020-07-21 ENCOUNTER — NURSE NAVIGATOR ENCOUNTER (OUTPATIENT)
Dept: HEMATOLOGY/ONCOLOGY | Facility: HOSPITAL | Age: 55
End: 2020-07-21

## 2020-07-21 ENCOUNTER — GENETICS ENCOUNTER (OUTPATIENT)
Dept: GENETICS | Facility: HOSPITAL | Age: 55
End: 2020-07-21
Attending: INTERNAL MEDICINE
Payer: COMMERCIAL

## 2020-07-21 ENCOUNTER — NURSE ONLY (OUTPATIENT)
Dept: HEMATOLOGY/ONCOLOGY | Facility: HOSPITAL | Age: 55
End: 2020-07-21
Attending: INTERNAL MEDICINE
Payer: COMMERCIAL

## 2020-07-21 PROCEDURE — 96040 HC GENETIC COUNSELING EA 30 MIN: CPT | Performed by: GENETIC COUNSELOR, MS

## 2020-07-21 PROCEDURE — 36415 COLL VENOUS BLD VENIPUNCTURE: CPT

## 2020-07-21 NOTE — PROGRESS NOTES
Referring Provider:  Abebe Siegel MD    Additional Provider(s):  MD Zachariah Carter MD    Reason for Referral:  Norma Estrada was referred for genetic counseling because of a personal and family history of breast cancer.   Ms. Lance Granda EPCAM deletion/duplication analysis), MSH6, or PMS2. Two somatic MSH6 pathogenic variants were detected in the colon tumor.   The observed loss of MLH1 and PMS2 is due to somatic MLH1 promoter hypermethylation, the loss of MSH6 is attributed to somatic mut Nghia harboring a BRCA1/2 pathogenic variant was done by using the [place] II Model. Based on this model, Ms. Agrawal’s risk of carrying a BRCA1/2 pathogenic variant is estimated to be 6%.   It is important to note that all prediction models have limita the gene involved. Medical recommendations for individuals with BRCA1/2 and CHEK2 pathogenic variants were reviewed as an example.  It was also explained that for some of the genes for which testing is available, the associated cancer risks have yet to be d notified and returned to provided a second blood sample. Results are expected in 6-13 days.

## 2020-07-21 NOTE — PROGRESS NOTES
Met with patient and her  in clinic. Introduced myself as the breast navigator nurse and explained my role and how I will work with all of the physicians involved in her care.  Explained the role of all of the physicians involved in her care eligio

## 2020-07-23 ENCOUNTER — OFFICE VISIT (OUTPATIENT)
Dept: HEMATOLOGY/ONCOLOGY | Age: 55
End: 2020-07-23
Attending: INTERNAL MEDICINE
Payer: COMMERCIAL

## 2020-07-23 VITALS
HEART RATE: 78 BPM | WEIGHT: 148.19 LBS | BODY MASS INDEX: 22 KG/M2 | RESPIRATION RATE: 18 BRPM | OXYGEN SATURATION: 98 % | SYSTOLIC BLOOD PRESSURE: 125 MMHG | DIASTOLIC BLOOD PRESSURE: 73 MMHG | TEMPERATURE: 99 F

## 2020-07-23 DIAGNOSIS — C50.211 MALIGNANT NEOPLASM OF UPPER-INNER QUADRANT OF RIGHT BREAST IN FEMALE, ESTROGEN RECEPTOR POSITIVE (HCC): Primary | ICD-10-CM

## 2020-07-23 DIAGNOSIS — C50.911 INVASIVE DUCTAL CARCINOMA OF RIGHT BREAST (HCC): Primary | ICD-10-CM

## 2020-07-23 DIAGNOSIS — Z17.0 MALIGNANT NEOPLASM OF UPPER-INNER QUADRANT OF RIGHT BREAST IN FEMALE, ESTROGEN RECEPTOR POSITIVE (HCC): Primary | ICD-10-CM

## 2020-07-23 PROCEDURE — 99205 OFFICE O/P NEW HI 60 MIN: CPT | Performed by: INTERNAL MEDICINE

## 2020-07-23 NOTE — PROGRESS NOTES
Patient is here for MD consult for breast cancer. Patient had a right breast biopsy on 7/15. She is meeting with Dr Gilmar Tinoco on Tuesday to discuss surgical options.        Education Record    Learner:  Patient and Spouse    Disease / Diagnosis:  Breast cancer

## 2020-07-24 ENCOUNTER — SOCIAL WORK SERVICES (OUTPATIENT)
Dept: HEMATOLOGY/ONCOLOGY | Facility: HOSPITAL | Age: 55
End: 2020-07-24

## 2020-07-24 ENCOUNTER — TELEPHONE (OUTPATIENT)
Dept: HEMATOLOGY/ONCOLOGY | Facility: HOSPITAL | Age: 55
End: 2020-07-24

## 2020-07-24 NOTE — PROGRESS NOTES
Received permission from patient to speak with mother about patient and resources available. Sw spoke with Harini Martin to obtain pamphlets/brochures to provide to patient.

## 2020-07-24 NOTE — PROGRESS NOTES
SW left voice message for patient with contact information. This message was in regards to patients mother contacting this writer for resources.  Due to HIPPA guidelines, Sw wanted to ensure that patient was aware her mother was calling and gave permission

## 2020-07-24 NOTE — TELEPHONE ENCOUNTER
Yahaira's Mother Carmen Brunson called saying Christos Gray was just diagnosed with breast cancer, and they were trying to do research on line, but would prefer a booklet.  She is wondering if there were any booklets that may be helpful that she would be able to  fro

## 2020-07-24 NOTE — CONSULTS
[facility]    PATIENT'S NAME: Phoebe Harris   CONSULTING PHYSICIAN: Joseph Roman M.D.    PATIENT ACCOUNT #: [de-identified] LOCATION: [address] [place] [address] RECORD #: [id number] YOB: 1965   CONSULTATION DATE: 07/23/2020       [facility] of an MRI and this is being ordered. She is still having menstrual periods; they are somewhat irregular. She had menarche at the age of 17 to 17. She used oral contraceptives for 10 to 15 years. She never took fertility drugs.   She is  4, para 3 hepatitis. She has no gallbladder disease. She is up to date with regard to her colonoscopies; her last was done in 2018 with Dr. Karrie Patel. She has had rare UTIs. She occasionally has mild urgency. She denies any thyroid or endocrine disorders.   She den markers may or may not be reflective of the true biology of this malignancy and they will need to be repeated on the final resection specimen. She does appear to be potentially a good candidate for breast conservation.   This will be determined by Dr. Maya Bay Jessica Taylor M.D.  d: 07/23/2020 17:10:57  t: 07/24/2020 07:20:12  [facility] [id number]  /    cc: KEVIN Carpenter D.O. Royetta Larch.  Sue Spence M.D.

## 2020-07-26 ENCOUNTER — HOSPITAL ENCOUNTER (OUTPATIENT)
Dept: MRI IMAGING | Facility: HOSPITAL | Age: 55
Discharge: HOME | End: 2020-07-26
Attending: INTERNAL MEDICINE
Payer: COMMERCIAL

## 2020-07-26 DIAGNOSIS — C50.911 INVASIVE DUCTAL CARCINOMA OF RIGHT BREAST (HCC): ICD-10-CM

## 2020-07-26 LAB — CREAT BLD-MCNC: 1 MG/DL (ref 0.55–1.02)

## 2020-07-26 PROCEDURE — 77049 MRI BREAST C-+ W/CAD BI: CPT | Performed by: INTERNAL MEDICINE

## 2020-07-26 PROCEDURE — 82565 ASSAY OF CREATININE: CPT

## 2020-07-26 PROCEDURE — A9575 INJ GADOTERATE MEGLUMI 0.1ML: HCPCS | Performed by: INTERNAL MEDICINE

## 2020-07-28 ENCOUNTER — OFFICE VISIT (OUTPATIENT)
Dept: SURGERY | Facility: CLINIC | Age: 55
End: 2020-07-28
Payer: COMMERCIAL

## 2020-07-28 ENCOUNTER — NURSE NAVIGATOR ENCOUNTER (OUTPATIENT)
Dept: HEMATOLOGY/ONCOLOGY | Facility: HOSPITAL | Age: 55
End: 2020-07-28

## 2020-07-28 VITALS
HEART RATE: 64 BPM | SYSTOLIC BLOOD PRESSURE: 119 MMHG | OXYGEN SATURATION: 100 % | BODY MASS INDEX: 21.42 KG/M2 | WEIGHT: 149.63 LBS | RESPIRATION RATE: 16 BRPM | DIASTOLIC BLOOD PRESSURE: 75 MMHG | HEIGHT: 70 IN

## 2020-07-28 DIAGNOSIS — C50.211 MALIGNANT NEOPLASM OF UPPER-INNER QUADRANT OF RIGHT BREAST IN FEMALE, ESTROGEN RECEPTOR POSITIVE (HCC): Primary | ICD-10-CM

## 2020-07-28 DIAGNOSIS — Z17.0 MALIGNANT NEOPLASM OF UPPER-INNER QUADRANT OF RIGHT BREAST IN FEMALE, ESTROGEN RECEPTOR POSITIVE (HCC): Primary | ICD-10-CM

## 2020-07-28 PROCEDURE — 3074F SYST BP LT 130 MM HG: CPT | Performed by: SURGERY

## 2020-07-28 PROCEDURE — 99245 OFF/OP CONSLTJ NEW/EST HI 55: CPT | Performed by: SURGERY

## 2020-07-28 PROCEDURE — 3008F BODY MASS INDEX DOCD: CPT | Performed by: SURGERY

## 2020-07-28 PROCEDURE — 3078F DIAST BP <80 MM HG: CPT | Performed by: SURGERY

## 2020-07-28 NOTE — PROGRESS NOTES
Breast Surgery New Patient Consultation    This is the first visit for this 50year old woman, referred by Dr. Anahi Gamez, who presents for evaluation of breast cancer.     History of Present Illness:   Ms. Tanya Blackwood is a 50year old woman who prese History:   Procedure Laterality Date   • BREAST BIOPSY Right 07/15/2020   • DENTAL SURGERY PROCEDURE  08    Implants   • OTHER      vein treatment   • TONSILLECTOMY         Gynecological History:  Pt is a   Pt was 36years old at time of first pre Diabetes Maternal Grandfather    • Colon Cancer Mother 68   • Other (Malt Lymphoma (Cancer)) Mother 68        oral cancer;undergoing treatment   • Allergies Daughter    • Anxiety Son    • Depression Son    • Thyroid disease Sister    • No Known Problems Br yellowing of the skin, indigestion, nausea, change in bowel habits, diarrhea, abdominal pain or vomiting blood.      Genitourinary:  The patient denies frequent urination, needing to get up at night to urinate, urinary hesitancy or retaining urine, painful The trachea is in the midline. Conjunctiva are clear, non-icteric. Chest: The chest expands symmetrically. The lungs are clear to auscultation. Heart: The rhythm is regular. There are no murmurs, rubs, gallops or thrills.     Breasts:  Her breasts ar clinical findings. I personally reviewed her recent imaging and pathology we discussed this at length. The natural history and evolution of breast cancer were discussed with Ms. Lawleria Fareed and her  family, including the difference between in-s

## 2020-07-28 NOTE — PROGRESS NOTES
Met with patient following appointment with Dr. Shon Klein. Pt is going to meet with plastic surgery to discuss reconstructive options. She is a candidate for lumpectomy, but trying to decide on surgical plan. Genetics are pending.  Pt will phone with any other

## 2020-07-30 ENCOUNTER — NURSE ONLY (OUTPATIENT)
Dept: HEMATOLOGY/ONCOLOGY | Facility: HOSPITAL | Age: 55
End: 2020-07-30
Attending: INTERNAL MEDICINE
Payer: COMMERCIAL

## 2020-07-30 PROCEDURE — 36415 COLL VENOUS BLD VENIPUNCTURE: CPT

## 2020-07-31 ENCOUNTER — OFFICE VISIT (OUTPATIENT)
Dept: SURGERY | Facility: CLINIC | Age: 55
End: 2020-07-31
Payer: COMMERCIAL

## 2020-07-31 VITALS
BODY MASS INDEX: 22.07 KG/M2 | DIASTOLIC BLOOD PRESSURE: 69 MMHG | TEMPERATURE: 99 F | SYSTOLIC BLOOD PRESSURE: 112 MMHG | RESPIRATION RATE: 16 BRPM | HEART RATE: 68 BPM | OXYGEN SATURATION: 98 % | HEIGHT: 69 IN | WEIGHT: 149 LBS

## 2020-07-31 DIAGNOSIS — Z17.0 MALIGNANT NEOPLASM OF UPPER-INNER QUADRANT OF RIGHT BREAST IN FEMALE, ESTROGEN RECEPTOR POSITIVE (HCC): Primary | ICD-10-CM

## 2020-07-31 DIAGNOSIS — C50.211 MALIGNANT NEOPLASM OF UPPER-INNER QUADRANT OF RIGHT BREAST IN FEMALE, ESTROGEN RECEPTOR POSITIVE (HCC): Primary | ICD-10-CM

## 2020-07-31 PROCEDURE — 3078F DIAST BP <80 MM HG: CPT | Performed by: SURGERY

## 2020-07-31 PROCEDURE — 3074F SYST BP LT 130 MM HG: CPT | Performed by: SURGERY

## 2020-07-31 PROCEDURE — 3008F BODY MASS INDEX DOCD: CPT | Performed by: SURGERY

## 2020-07-31 PROCEDURE — 99243 OFF/OP CNSLTJ NEW/EST LOW 30: CPT | Performed by: SURGERY

## 2020-07-31 NOTE — CONSULTS
New Patient Consultation    This is the first visit for this 50year old female who presents to discuss reconstructive options following surgery for breast cancer. History of Present Illness:    The patient is a 50year old female who presents with a rig MCG/ACT Nasal Suspension, 2 sprays by Each Nare route daily. , Disp: 1 Inhaler, Rfl: 0  Fexofenadine HCl 180 MG Oral Tab, Take 180 mg by mouth daily. , Disp: , Rfl:   Probiotic Product (PROBIOTIC DAILY OR), Take by mouth., Disp: , Rfl:   Multiple Vitamins-Mi double vision, cataracts, glaucoma, nasal congestion, nosebleed, hoarseness, sore throat, or swollen glands. Respiratory:  The patient denies shortness of breath, cough, bloody cough, phlegm, +asthma, or wheezing. Cardiovascular:    The patient denies arm, Patient Position: Sitting, Cuff Size: adult)   Pulse 68   Temp 98.6 °F (37 °C) (Tympanic)   Resp 16   Ht 1.753 m (5' 9\")   Wt 67.6 kg (149 lb)   LMP 03/05/2020 (Exact Date)   SpO2 98%   BMI 22.00 kg/m²     The patient is awake, alert, and oriented. patient's breast size and shape, she is a suitable candidate for nipple sparing approach so long as she is deemed so from an oncologic standpoint.       Next, we reviewed the options for post-mastectomy reconstruction and discussed the risks/benefits of  ti reconstruction (should it be deemed necessary based on the final pathology results), including increased risk of reconstructive loss and capsular contracture. Multiple questions were answered to the patient and 's satisfaction.  No guarantees as t

## 2020-08-04 ENCOUNTER — TELEPHONE (OUTPATIENT)
Dept: HEMATOLOGY/ONCOLOGY | Facility: HOSPITAL | Age: 55
End: 2020-08-04

## 2020-08-04 NOTE — TELEPHONE ENCOUNTER
Patient is leaning towards lumpectomy vs mastectomy. She is concerned about risk of recurrence. If recurrence were to occur, would it be in the same breast or other breast. Is her risk of recurrence still 1% with mastectomy and 3-4% with lumpectomy?  How wi

## 2020-08-04 NOTE — TELEPHONE ENCOUNTER
Kina Blanco called saying she has had time to think since her last appointment and she now has some questions she would like to discuss before making decisions about her surgery.  Please call

## 2020-08-05 ENCOUNTER — TELEPHONE (OUTPATIENT)
Dept: SURGERY | Facility: CLINIC | Age: 55
End: 2020-08-05

## 2020-08-05 ENCOUNTER — DOCUMENTATION ONLY (OUTPATIENT)
Dept: SURGERY | Facility: CLINIC | Age: 55
End: 2020-08-05

## 2020-08-05 ENCOUNTER — GENETICS ENCOUNTER (OUTPATIENT)
Dept: HEMATOLOGY/ONCOLOGY | Facility: HOSPITAL | Age: 55
End: 2020-08-05

## 2020-08-05 DIAGNOSIS — C50.211 MALIGNANT NEOPLASM OF UPPER-INNER QUADRANT OF RIGHT FEMALE BREAST, UNSPECIFIED ESTROGEN RECEPTOR STATUS (HCC): Primary | ICD-10-CM

## 2020-08-05 NOTE — TELEPHONE ENCOUNTER
Returned pt phone call regarding her decision on having a lumpectomy done. Informed pt that I have put together a surgical sheet and confirmed it with Dr. Shaneka Edwards. Pt educated on Pre-operative instructions. Given Procedure/Surgery handout via Advisor Client [employer].   I

## 2020-08-05 NOTE — PATIENT INSTRUCTIONS
Dr. Darien Corbin MD    [facility]  Tel:  [phone]      [address], [place], [place]   Fax: [phone]    __________________________________________________________________      Surgery/Procedure: Right Breast W acceptable*    _____________________________________________________________________    PRE-OPERATIVE TESTING IF INDICATED BELOW      [] CBC   [] BMP   [] EKG     [] CMP            [] PT, PTT, INR     [] Cardiac Clearance      [] H & P Medical Clearance

## 2020-08-05 NOTE — TELEPHONE ENCOUNTER
I called the patient and scheduled her procedure with Dr Pat Ziegler on 08/13/2020 at [place]. Confirmed location

## 2020-08-05 NOTE — PROGRESS NOTES
Referring Provider:                    Marie Lennon MD     Additional Provider(s):              Woodie Horner, MD Bobbetta Sicard, MD    Reason for Referral:  Josh Pederson had genetic testing per unexplained. The limitations of the testing include the chance that a pathogenic variant in a gene other than those included in this panel might be the cause of cancer in Ms. Agrawal or her relatives.   Ms. Alberto Chau should contact me on an annual basis t

## 2020-08-11 ENCOUNTER — LAB ENCOUNTER (OUTPATIENT)
Dept: LAB | Facility: HOSPITAL | Age: 55
End: 2020-08-11
Attending: SURGERY
Payer: COMMERCIAL

## 2020-08-11 DIAGNOSIS — Z01.818 PREOP TESTING: ICD-10-CM

## 2020-08-12 LAB — SARS-COV-2 RNA RESP QL NAA+PROBE: NOT DETECTED

## 2020-08-13 ENCOUNTER — HOSPITAL ENCOUNTER (OUTPATIENT)
Dept: NUCLEAR MEDICINE | Facility: HOSPITAL | Age: 55
Discharge: HOME | End: 2020-08-13
Attending: SURGERY
Payer: COMMERCIAL

## 2020-08-13 ENCOUNTER — HOSPITAL ENCOUNTER (OUTPATIENT)
Dept: MAMMOGRAPHY | Facility: HOSPITAL | Age: 55
Discharge: HOME | End: 2020-08-13
Attending: SURGERY
Payer: COMMERCIAL

## 2020-08-13 ENCOUNTER — ANESTHESIA EVENT (OUTPATIENT)
Dept: SURGERY | Facility: HOSPITAL | Age: 55
End: 2020-08-13
Payer: COMMERCIAL

## 2020-08-13 ENCOUNTER — APPOINTMENT (OUTPATIENT)
Dept: MAMMOGRAPHY | Facility: HOSPITAL | Age: 55
End: 2020-08-13
Attending: SURGERY
Payer: COMMERCIAL

## 2020-08-13 ENCOUNTER — ANESTHESIA (OUTPATIENT)
Dept: SURGERY | Facility: HOSPITAL | Age: 55
End: 2020-08-13
Payer: COMMERCIAL

## 2020-08-13 ENCOUNTER — HOSPITAL ENCOUNTER (OUTPATIENT)
Facility: HOSPITAL | Age: 55
Setting detail: HOSPITAL OUTPATIENT SURGERY
Discharge: HOME | End: 2020-08-13
Attending: SURGERY | Admitting: SURGERY
Payer: COMMERCIAL

## 2020-08-13 ENCOUNTER — HOSPITAL ENCOUNTER (OUTPATIENT)
Dept: ULTRASOUND IMAGING | Facility: HOSPITAL | Age: 55
Discharge: HOME | End: 2020-08-13
Attending: SURGERY
Payer: COMMERCIAL

## 2020-08-13 VITALS
HEART RATE: 79 BPM | BODY MASS INDEX: 21.19 KG/M2 | RESPIRATION RATE: 16 BRPM | DIASTOLIC BLOOD PRESSURE: 51 MMHG | SYSTOLIC BLOOD PRESSURE: 111 MMHG | HEIGHT: 70 IN | WEIGHT: 148 LBS | OXYGEN SATURATION: 98 % | TEMPERATURE: 98 F

## 2020-08-13 DIAGNOSIS — C50.211 MALIGNANT NEOPLASM OF UPPER-INNER QUADRANT OF RIGHT FEMALE BREAST, UNSPECIFIED ESTROGEN RECEPTOR STATUS (HCC): ICD-10-CM

## 2020-08-13 DIAGNOSIS — Z01.818 PREOP TESTING: ICD-10-CM

## 2020-08-13 DIAGNOSIS — C50.211 MALIGNANT NEOPLASM OF UPPER-INNER QUADRANT OF RIGHT FEMALE BREAST, UNSPECIFIED ESTROGEN RECEPTOR STATUS (HCC): Primary | ICD-10-CM

## 2020-08-13 LAB — B-HCG UR QL: NEGATIVE

## 2020-08-13 PROCEDURE — 81025 URINE PREGNANCY TEST: CPT

## 2020-08-13 PROCEDURE — 76098 X-RAY EXAM SURGICAL SPECIMEN: CPT | Performed by: SURGERY

## 2020-08-13 PROCEDURE — 88307 TISSUE EXAM BY PATHOLOGIST: CPT | Performed by: SURGERY

## 2020-08-13 PROCEDURE — 88363 XM ARCHIVE TISSUE MOLEC ANAL: CPT | Performed by: SURGERY

## 2020-08-13 PROCEDURE — 88360 TUMOR IMMUNOHISTOCHEM/MANUAL: CPT | Performed by: SURGERY

## 2020-08-13 PROCEDURE — 07B50ZX EXCISION OF RIGHT AXILLARY LYMPHATIC, OPEN APPROACH, DIAGNOSTIC: ICD-10-PCS | Performed by: SURGERY

## 2020-08-13 PROCEDURE — 78195 LYMPH SYSTEM IMAGING: CPT | Performed by: SURGERY

## 2020-08-13 PROCEDURE — 19285 PERQ DEV BREAST 1ST US IMAG: CPT | Performed by: SURGERY

## 2020-08-13 PROCEDURE — 0HBT0ZZ EXCISION OF RIGHT BREAST, OPEN APPROACH: ICD-10-PCS | Performed by: SURGERY

## 2020-08-13 PROCEDURE — 77065 DX MAMMO INCL CAD UNI: CPT | Performed by: SURGERY

## 2020-08-13 PROCEDURE — 88342 IMHCHEM/IMCYTCHM 1ST ANTB: CPT | Performed by: SURGERY

## 2020-08-13 RX ORDER — DEXAMETHASONE SODIUM PHOSPHATE 4 MG/ML
VIAL (ML) INJECTION AS NEEDED
Status: DISCONTINUED | OUTPATIENT
Start: 2020-08-13 | End: 2020-08-13 | Stop reason: SURG

## 2020-08-13 RX ORDER — HYDROMORPHONE HYDROCHLORIDE 1 MG/ML
0.4 INJECTION, SOLUTION INTRAMUSCULAR; INTRAVENOUS; SUBCUTANEOUS EVERY 5 MIN PRN
Status: DISCONTINUED | OUTPATIENT
Start: 2020-08-13 | End: 2020-08-13

## 2020-08-13 RX ORDER — HYDROCODONE BITARTRATE AND ACETAMINOPHEN 5; 325 MG/1; MG/1
2 TABLET ORAL AS NEEDED
Status: DISCONTINUED | OUTPATIENT
Start: 2020-08-13 | End: 2020-08-13

## 2020-08-13 RX ORDER — BUPIVACAINE HYDROCHLORIDE 5 MG/ML
INJECTION, SOLUTION EPIDURAL; INTRACAUDAL AS NEEDED
Status: DISCONTINUED | OUTPATIENT
Start: 2020-08-13 | End: 2020-08-13 | Stop reason: HOSPADM

## 2020-08-13 RX ORDER — NALOXONE HYDROCHLORIDE 0.4 MG/ML
80 INJECTION, SOLUTION INTRAMUSCULAR; INTRAVENOUS; SUBCUTANEOUS AS NEEDED
Status: DISCONTINUED | OUTPATIENT
Start: 2020-08-13 | End: 2020-08-13

## 2020-08-13 RX ORDER — ACETAMINOPHEN 500 MG
1000 TABLET ORAL ONCE
Status: COMPLETED | OUTPATIENT
Start: 2020-08-13 | End: 2020-08-13

## 2020-08-13 RX ORDER — METOCLOPRAMIDE 10 MG/1
10 TABLET ORAL ONCE
Status: DISCONTINUED | OUTPATIENT
Start: 2020-08-13 | End: 2020-08-13 | Stop reason: HOSPADM

## 2020-08-13 RX ORDER — MIDAZOLAM HYDROCHLORIDE 1 MG/ML
INJECTION INTRAMUSCULAR; INTRAVENOUS AS NEEDED
Status: DISCONTINUED | OUTPATIENT
Start: 2020-08-13 | End: 2020-08-13 | Stop reason: SURG

## 2020-08-13 RX ORDER — MORPHINE SULFATE 4 MG/ML
2 INJECTION, SOLUTION INTRAMUSCULAR; INTRAVENOUS EVERY 10 MIN PRN
Status: DISCONTINUED | OUTPATIENT
Start: 2020-08-13 | End: 2020-08-13

## 2020-08-13 RX ORDER — HYDROMORPHONE HYDROCHLORIDE 1 MG/ML
0.2 INJECTION, SOLUTION INTRAMUSCULAR; INTRAVENOUS; SUBCUTANEOUS EVERY 5 MIN PRN
Status: DISCONTINUED | OUTPATIENT
Start: 2020-08-13 | End: 2020-08-13

## 2020-08-13 RX ORDER — ROCURONIUM BROMIDE 10 MG/ML
INJECTION, SOLUTION INTRAVENOUS AS NEEDED
Status: DISCONTINUED | OUTPATIENT
Start: 2020-08-13 | End: 2020-08-13 | Stop reason: SURG

## 2020-08-13 RX ORDER — MORPHINE SULFATE 10 MG/ML
6 INJECTION, SOLUTION INTRAMUSCULAR; INTRAVENOUS EVERY 10 MIN PRN
Status: DISCONTINUED | OUTPATIENT
Start: 2020-08-13 | End: 2020-08-13

## 2020-08-13 RX ORDER — HALOPERIDOL 5 MG/ML
0.25 INJECTION INTRAMUSCULAR ONCE AS NEEDED
Status: DISCONTINUED | OUTPATIENT
Start: 2020-08-13 | End: 2020-08-13

## 2020-08-13 RX ORDER — MORPHINE SULFATE 4 MG/ML
4 INJECTION, SOLUTION INTRAMUSCULAR; INTRAVENOUS EVERY 10 MIN PRN
Status: DISCONTINUED | OUTPATIENT
Start: 2020-08-13 | End: 2020-08-13

## 2020-08-13 RX ORDER — SODIUM CHLORIDE, SODIUM LACTATE, POTASSIUM CHLORIDE, CALCIUM CHLORIDE 600; 310; 30; 20 MG/100ML; MG/100ML; MG/100ML; MG/100ML
INJECTION, SOLUTION INTRAVENOUS CONTINUOUS
Status: DISCONTINUED | OUTPATIENT
Start: 2020-08-13 | End: 2020-08-13

## 2020-08-13 RX ORDER — HYDROCODONE BITARTRATE AND ACETAMINOPHEN 5; 325 MG/1; MG/1
1-2 TABLET ORAL EVERY 6 HOURS PRN
Qty: 20 TABLET | Refills: 0 | Status: SHIPPED | OUTPATIENT
Start: 2020-08-13 | End: 2020-08-21

## 2020-08-13 RX ORDER — PROCHLORPERAZINE EDISYLATE 5 MG/ML
5 INJECTION INTRAMUSCULAR; INTRAVENOUS ONCE AS NEEDED
Status: DISCONTINUED | OUTPATIENT
Start: 2020-08-13 | End: 2020-08-13

## 2020-08-13 RX ORDER — HYDROMORPHONE HYDROCHLORIDE 1 MG/ML
0.6 INJECTION, SOLUTION INTRAMUSCULAR; INTRAVENOUS; SUBCUTANEOUS EVERY 5 MIN PRN
Status: DISCONTINUED | OUTPATIENT
Start: 2020-08-13 | End: 2020-08-13

## 2020-08-13 RX ORDER — ONDANSETRON 2 MG/ML
INJECTION INTRAMUSCULAR; INTRAVENOUS AS NEEDED
Status: DISCONTINUED | OUTPATIENT
Start: 2020-08-13 | End: 2020-08-13 | Stop reason: SURG

## 2020-08-13 RX ORDER — CEFAZOLIN SODIUM/WATER 2 G/20 ML
2 SYRINGE (ML) INTRAVENOUS ONCE
Status: DISCONTINUED | OUTPATIENT
Start: 2020-08-13 | End: 2020-08-13 | Stop reason: HOSPADM

## 2020-08-13 RX ORDER — ONDANSETRON 2 MG/ML
4 INJECTION INTRAMUSCULAR; INTRAVENOUS ONCE AS NEEDED
Status: DISCONTINUED | OUTPATIENT
Start: 2020-08-13 | End: 2020-08-13

## 2020-08-13 RX ORDER — LIDOCAINE HYDROCHLORIDE 10 MG/ML
INJECTION, SOLUTION EPIDURAL; INFILTRATION; INTRACAUDAL; PERINEURAL AS NEEDED
Status: DISCONTINUED | OUTPATIENT
Start: 2020-08-13 | End: 2020-08-13 | Stop reason: SURG

## 2020-08-13 RX ORDER — LIDOCAINE HYDROCHLORIDE AND EPINEPHRINE 10; 10 MG/ML; UG/ML
INJECTION, SOLUTION INFILTRATION; PERINEURAL AS NEEDED
Status: DISCONTINUED | OUTPATIENT
Start: 2020-08-13 | End: 2020-08-13 | Stop reason: HOSPADM

## 2020-08-13 RX ORDER — BUTALBITAL, ACETAMINOPHEN AND CAFFEINE 50; 325; 40 MG/1; MG/1; MG/1
1 TABLET ORAL EVERY 4 HOURS PRN
Status: DISCONTINUED | OUTPATIENT
Start: 2020-08-13 | End: 2020-08-13 | Stop reason: HOSPADM

## 2020-08-13 RX ORDER — FAMOTIDINE 20 MG/1
20 TABLET ORAL ONCE
Status: DISCONTINUED | OUTPATIENT
Start: 2020-08-13 | End: 2020-08-13 | Stop reason: HOSPADM

## 2020-08-13 RX ORDER — EPHEDRINE SULFATE 50 MG/ML
INJECTION, SOLUTION INTRAVENOUS AS NEEDED
Status: DISCONTINUED | OUTPATIENT
Start: 2020-08-13 | End: 2020-08-13 | Stop reason: SURG

## 2020-08-13 RX ORDER — HYDROCODONE BITARTRATE AND ACETAMINOPHEN 5; 325 MG/1; MG/1
1 TABLET ORAL AS NEEDED
Status: DISCONTINUED | OUTPATIENT
Start: 2020-08-13 | End: 2020-08-13

## 2020-08-13 RX ADMIN — SODIUM CHLORIDE, SODIUM LACTATE, POTASSIUM CHLORIDE, CALCIUM CHLORIDE: 600; 310; 30; 20 INJECTION, SOLUTION INTRAVENOUS at 15:45:00

## 2020-08-13 RX ADMIN — SODIUM CHLORIDE, SODIUM LACTATE, POTASSIUM CHLORIDE, CALCIUM CHLORIDE: 600; 310; 30; 20 INJECTION, SOLUTION INTRAVENOUS at 16:31:00

## 2020-08-13 RX ADMIN — LIDOCAINE HYDROCHLORIDE 50 MG: 10 INJECTION, SOLUTION EPIDURAL; INFILTRATION; INTRACAUDAL; PERINEURAL at 15:50:00

## 2020-08-13 RX ADMIN — DEXAMETHASONE SODIUM PHOSPHATE 4 MG: 4 MG/ML VIAL (ML) INJECTION at 15:59:00

## 2020-08-13 RX ADMIN — ONDANSETRON 4 MG: 2 INJECTION INTRAMUSCULAR; INTRAVENOUS at 15:59:00

## 2020-08-13 RX ADMIN — EPHEDRINE SULFATE 5 MG: 50 INJECTION, SOLUTION INTRAVENOUS at 16:19:00

## 2020-08-13 RX ADMIN — EPHEDRINE SULFATE 5 MG: 50 INJECTION, SOLUTION INTRAVENOUS at 16:29:00

## 2020-08-13 RX ADMIN — ROCURONIUM BROMIDE 3 MG: 10 INJECTION, SOLUTION INTRAVENOUS at 15:50:00

## 2020-08-13 RX ADMIN — MIDAZOLAM HYDROCHLORIDE 1 MG: 1 INJECTION INTRAMUSCULAR; INTRAVENOUS at 15:50:00

## 2020-08-13 NOTE — BRIEF OP NOTE
Pre-Operative Diagnosis: Malignant neoplasm of upper-inner quadrant of right female breast, unspecified estrogen receptor status ([facility] [C50.211]     Post-Operative Diagnosis: * No post-op diagnosis entered *      Procedure Performed:   Procedure(s):  Right

## 2020-08-13 NOTE — ANESTHESIA PROCEDURE NOTES
Airway  Urgency: Elective      General Information and Staff    Patient location during procedure: OR  Anesthesiologist: Miller Sadler MD  Resident/CRNA: Zehra Haque CRNA  Performed: CRNA     Indications and Patient Condition  Indications for airw
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
## 2020-08-13 NOTE — IMAGING NOTE
1218 Pt  to ultrasound department scouts completed by us Mariano tech     [phone] Hx taken procedure explained questions answered. Order verified and initialed by all staff members .      1110 Consent signed and verified prior to [facility] room arrival.      7233 Dr HERMAN

## 2020-08-13 NOTE — ANESTHESIA POSTPROCEDURE EVALUATION
Patient: Bakari Eason    Procedure Summary     Date:  08/13/20 Room / Location:  [address] [place] [address] MAIN OR 16 / [address] [place] [address] MAIN OR    Anesthesia Start:  9216 Anesthesia Stop:  3091    Procedures:       BREAST LUMPECTOMY (Right )      BREAST SENTINEL LYMPH NODE BIOPSY (R

## 2020-08-14 NOTE — OPERATIVE REPORT
[facility]    PATIENT'S NAME: Kristian Reeve   ATTENDING PHYSICIAN: Darcy Cleveland. Curt Andersen MD   OPERATING PHYSICIAN: Darcy Cleveland.  Curt Andersen MD   PATIENT ACCOUNT#:   [id number]    LOCATION:  [address]  MEDICAL RECORD #:   [id number] patient. She agreed to proceed. OPERATIVE TECHNIQUE:  Patient was brought to the imaging suite.   She underwent a wire localization of the area of concern in the right breast.  She also underwent injection of radioisotope as well as lymphoscintigraphy f knife in the skin. Her wire was identified, brought into the field, and using sharp dissection and electrocautery, a segment of breast tissue surrounding the tip of the wire was excised.   It was oriented with a short stitch, single clip superiorly, and 2

## 2020-08-17 ENCOUNTER — NURSE NAVIGATOR ENCOUNTER (OUTPATIENT)
Dept: HEMATOLOGY/ONCOLOGY | Facility: HOSPITAL | Age: 55
End: 2020-08-17

## 2020-08-17 NOTE — PROGRESS NOTES
Phoned patient to discuss plan of care including follow up with Dr. Marilin Carlton. Contact information provided and requested return phone call.

## 2020-08-18 ENCOUNTER — NURSE NAVIGATOR ENCOUNTER (OUTPATIENT)
Dept: HEMATOLOGY/ONCOLOGY | Facility: HOSPITAL | Age: 55
End: 2020-08-18

## 2020-08-21 ENCOUNTER — NURSE NAVIGATOR ENCOUNTER (OUTPATIENT)
Dept: HEMATOLOGY/ONCOLOGY | Facility: HOSPITAL | Age: 55
End: 2020-08-21

## 2020-08-21 ENCOUNTER — OFFICE VISIT (OUTPATIENT)
Dept: SURGERY | Facility: CLINIC | Age: 55
End: 2020-08-21
Payer: COMMERCIAL

## 2020-08-21 ENCOUNTER — OFFICE VISIT (OUTPATIENT)
Dept: HEMATOLOGY/ONCOLOGY | Facility: HOSPITAL | Age: 55
End: 2020-08-21
Attending: INTERNAL MEDICINE
Payer: COMMERCIAL

## 2020-08-21 VITALS
OXYGEN SATURATION: 99 % | HEART RATE: 57 BPM | SYSTOLIC BLOOD PRESSURE: 109 MMHG | DIASTOLIC BLOOD PRESSURE: 67 MMHG | RESPIRATION RATE: 16 BRPM

## 2020-08-21 VITALS
SYSTOLIC BLOOD PRESSURE: 121 MMHG | BODY MASS INDEX: 22 KG/M2 | HEART RATE: 63 BPM | TEMPERATURE: 98 F | OXYGEN SATURATION: 100 % | RESPIRATION RATE: 18 BRPM | WEIGHT: 153.63 LBS | DIASTOLIC BLOOD PRESSURE: 69 MMHG

## 2020-08-21 DIAGNOSIS — C50.211 MALIGNANT NEOPLASM OF UPPER-INNER QUADRANT OF RIGHT BREAST IN FEMALE, ESTROGEN RECEPTOR POSITIVE (HCC): Primary | ICD-10-CM

## 2020-08-21 DIAGNOSIS — Z17.0 MALIGNANT NEOPLASM OF UPPER-INNER QUADRANT OF RIGHT BREAST IN FEMALE, ESTROGEN RECEPTOR POSITIVE (HCC): Primary | ICD-10-CM

## 2020-08-21 PROCEDURE — 3078F DIAST BP <80 MM HG: CPT | Performed by: INTERNAL MEDICINE

## 2020-08-21 PROCEDURE — 99024 POSTOP FOLLOW-UP VISIT: CPT | Performed by: SURGERY

## 2020-08-21 PROCEDURE — 99214 OFFICE O/P EST MOD 30 MIN: CPT | Performed by: INTERNAL MEDICINE

## 2020-08-21 PROCEDURE — 3078F DIAST BP <80 MM HG: CPT | Performed by: SURGERY

## 2020-08-21 PROCEDURE — 3074F SYST BP LT 130 MM HG: CPT | Performed by: SURGERY

## 2020-08-21 PROCEDURE — 3074F SYST BP LT 130 MM HG: CPT | Performed by: INTERNAL MEDICINE

## 2020-08-21 NOTE — PROGRESS NOTES
Met with patient prior to appointment with Dr. Alice Mcarthur. Pt also met with Dr. Melanie Gotti today. She is doing well post surgery, taking Aleve only. Oncotype has been ordered. We will phone with result, if low she will start Tamoxifen.  If high, she will come back

## 2020-08-21 NOTE — PROGRESS NOTES
Patient is here for MD f/u post right lumpectomy on 8/13. Patient states she is healing well. Minimal pain. Patient has been taking Tylenol as needed. Patient reports she developed bruising along the inner right thigh after surgery.  It continues to improve

## 2020-08-23 NOTE — PROGRESS NOTES
[facility]    PATIENT'S NAME: Arsen Cortez   ATTENDING PHYSICIAN: Jacqueline Dobson M.D.    PATIENT ACCOUNT #: [de-identified] LOCATION: [address] [place] [address] RECORD #: [id number] YOB: 1965   DATE OF SERVICE: 08/21/2020       CANCER regularly and the preferred agent would be tamoxifen. We also discussed an aromatase inhibitor. I explained that if she were high risk, one could consider oophorectomy and then initiation of an aromatase inhibitor.   The side effects would likely be signi

## 2020-08-24 NOTE — PROGRESS NOTES
Breast Surgery Post-Operative Visit    Diagnosis: Right breast cancer status post lumpectomy and sentinel lymph node biopsy on August 13, 2020.     Stage: Cancer Staging  Malignant neoplasm of upper-inner quadrant of right breast in female, estrogen recepto chills, erythema or drainage from her incision. She is here today for evaluation and recommendations for further therapy.         Past Medical History:   Diagnosis Date   • Allergic rhinitis due to pollen 9/28/2011    Skin tests 2013- allergic to mold, rag Father 79   • Diabetes Maternal Grandfather    • Colon Cancer Mother 68   • Other (Malt Lymphoma (Cancer)) Mother 68        oral cancer;undergoing treatment   • Allergies Daughter    • Anxiety Son    • Depression Son    • Thyroid disease Sister    • No Kno constipation, yellowing of the skin, indigestion, nausea, change in bowel habits, diarrhea, abdominal pain or vomiting blood.      Genitourinary:  The patient denies frequent urination, needing to get up at night to urinate, urinary hesitancy or retaining u G3, ER+, HI-, HER2-) - Signed by Hema Moulton MD on 7/23/2020      Discussion and Plan:  I had a discussion with the Patient regarding her breast exam. On exam today I found her to be healing well since surgery with no signs of infection.   I personal

## 2020-08-25 ENCOUNTER — HOSPITAL ENCOUNTER (OUTPATIENT)
Dept: RADIATION ONCOLOGY | Facility: HOSPITAL | Age: 55
Discharge: HOME | End: 2020-08-25
Attending: INTERNAL MEDICINE
Payer: COMMERCIAL

## 2020-08-25 VITALS
HEART RATE: 70 BPM | TEMPERATURE: 98 F | RESPIRATION RATE: 18 BRPM | SYSTOLIC BLOOD PRESSURE: 110 MMHG | OXYGEN SATURATION: 98 % | DIASTOLIC BLOOD PRESSURE: 67 MMHG

## 2020-08-25 DIAGNOSIS — C50.211 MALIGNANT NEOPLASM OF UPPER-INNER QUADRANT OF RIGHT BREAST IN FEMALE, ESTROGEN RECEPTOR POSITIVE (HCC): Primary | ICD-10-CM

## 2020-08-25 DIAGNOSIS — Z17.0 MALIGNANT NEOPLASM OF UPPER-INNER QUADRANT OF RIGHT BREAST IN FEMALE, ESTROGEN RECEPTOR POSITIVE (HCC): Primary | ICD-10-CM

## 2020-08-25 PROCEDURE — 99214 OFFICE O/P EST MOD 30 MIN: CPT

## 2020-08-25 NOTE — PATIENT INSTRUCTIONS
- Destinee Guerra for results of Oncotype testing.  He will direct you back to our office at the appropriate time for radiation, & we can schedule your planning or \"mapping\" scan at that time    - IF [address], PLEASE LAILA

## 2020-08-25 NOTE — CONSULTS
[facility] Oncology New Consultation      Patient Name: Kortney Cornejo   MRN: [id number]  PCP: Chelsea Keys DO  Referring Physician: Nic Cuadra MD; Vivian Lo MD    Diagnosis Site: right breast  Stage: pT1a (4mm therapy with Dr. Juanis Flores.     Past Medical History  Right breast cancer with details above  Asthma/allergies    Past Surgical History  Tonsillectomy  Dental implants  Vein treatment  Right breast lumpectomy and sentinel lymph node biopsy    Medications  Curr chemotherapy. No history of lupus, collagen-vascular disease, or inflammatory bowel disease.      Physical Exam   20  1408   BP: 110/67   Pulse: 70   Resp: 18   Temp: 97.9 °F (36.6 °C)     ECO    Gen: NAD  HEENT: NCAT  Neck: no LAD  CV: RRR  Lung to our department for [address]. Latonya Rodriguez MD  Radiation Oncology    I spent 60 minutes with the patient, more than 50% in counseling and in coordination of care.

## 2020-08-25 NOTE — PROGRESS NOTES
Nursing Consultation Note  Patient: Adalberto Shultz  YOB: 1965  Age: 50year old  Radiation Oncologist: Dr. Julissa Camilo  Referring Physician: [email or web address]  Consult Date: 8/25/2020      Chemotherapy: n/a  Lab Coughing    Current Outpatient Medications   Medication Sig Dispense Refill   • Ergocalciferol (VITAMIN D OR) Take 2,000 Units by mouth daily. • COLLAGEN OR Take 1 capsule by mouth daily.      • Albuterol Sulfate HFA (PROAIR HFA) 108 (90 Base) MCG/ACT I 8/13/2020    Performed by Clive Turner MD at [facility] OR   • BREAST LUMPECTOMY Right 8/13/2020    Performed by Clive Turner MD at [facility] OR   • BREAST SENTINEL LYMPH NODE BIOPSY Right 8/13/2020    Performed by Clive Turner MD at [place] [address] Service: Not Asked        Blood Transfusions: Not Asked        Caffeine Concern: Yes          1 a day        Occupational Exposure: Not Asked        Hobby Hazards: Not Asked        Sleep Concern: Not Asked        Stress Concern: Not Asked        Weight Con

## 2020-08-26 ENCOUNTER — TELEPHONE (OUTPATIENT)
Dept: HEMATOLOGY/ONCOLOGY | Facility: HOSPITAL | Age: 55
End: 2020-08-26

## 2020-08-26 NOTE — TELEPHONE ENCOUNTER
Left message that Oncotype DX came back with a high score (28). Technically this suggests that chemo is a good idea (TC X4) but her size is small and if she were adamant about not wanting it that would not be wrong.   This is a situation where getting anot

## 2020-08-27 ENCOUNTER — TELEPHONE (OUTPATIENT)
Dept: RADIATION ONCOLOGY | Facility: HOSPITAL | Age: 55
End: 2020-08-27

## 2020-08-27 RX ORDER — MOMETASONE FUROATE 1 MG/G
CREAM TOPICAL
Qty: 50 G | Refills: 3 | Status: SHIPPED | OUTPATIENT
Start: 2020-08-27 | End: 2020-10-30

## 2020-09-08 ENCOUNTER — OFFICE VISIT (OUTPATIENT)
Dept: HEMATOLOGY/ONCOLOGY | Facility: HOSPITAL | Age: 55
End: 2020-09-08
Attending: INTERNAL MEDICINE
Payer: COMMERCIAL

## 2020-09-08 VITALS
BODY MASS INDEX: 22 KG/M2 | RESPIRATION RATE: 16 BRPM | DIASTOLIC BLOOD PRESSURE: 71 MMHG | HEART RATE: 64 BPM | WEIGHT: 152 LBS | SYSTOLIC BLOOD PRESSURE: 128 MMHG | TEMPERATURE: 98 F | OXYGEN SATURATION: 99 %

## 2020-09-08 DIAGNOSIS — C50.211 MALIGNANT NEOPLASM OF UPPER-INNER QUADRANT OF RIGHT BREAST IN FEMALE, ESTROGEN RECEPTOR POSITIVE (HCC): Primary | ICD-10-CM

## 2020-09-08 DIAGNOSIS — Z17.0 MALIGNANT NEOPLASM OF UPPER-INNER QUADRANT OF RIGHT BREAST IN FEMALE, ESTROGEN RECEPTOR POSITIVE (HCC): Primary | ICD-10-CM

## 2020-09-08 PROCEDURE — 99214 OFFICE O/P EST MOD 30 MIN: CPT | Performed by: INTERNAL MEDICINE

## 2020-09-08 RX ORDER — ASCORBIC ACID 500 MG
500 TABLET ORAL DAILY
COMMUNITY
End: 2020-10-30

## 2020-09-08 RX ORDER — DEXAMETHASONE 4 MG/1
TABLET ORAL
Qty: 34 TABLET | Refills: 1 | Status: SHIPPED | OUTPATIENT
Start: 2020-09-08 | End: 2020-12-10 | Stop reason: ALTCHOICE

## 2020-09-08 NOTE — PROGRESS NOTES
Patient is here for MD f/u for breast cancer. Patient is here to discuss oncotype results and the next steps. She has not started Tamoxifen yet.      Education Record    Learner:  Patient and Spouse    Disease / Diagnosis:  Breast cancer     Devon / Ashvin Field

## 2020-09-11 ENCOUNTER — OFFICE VISIT (OUTPATIENT)
Dept: HEMATOLOGY/ONCOLOGY | Facility: HOSPITAL | Age: 55
End: 2020-09-11
Attending: INTERNAL MEDICINE
Payer: COMMERCIAL

## 2020-09-11 DIAGNOSIS — C50.211 MALIGNANT NEOPLASM OF UPPER-INNER QUADRANT OF RIGHT BREAST IN FEMALE, ESTROGEN RECEPTOR POSITIVE (HCC): Primary | ICD-10-CM

## 2020-09-11 DIAGNOSIS — Z71.9 ENCOUNTER FOR EDUCATION: ICD-10-CM

## 2020-09-11 DIAGNOSIS — Z17.0 MALIGNANT NEOPLASM OF UPPER-INNER QUADRANT OF RIGHT BREAST IN FEMALE, ESTROGEN RECEPTOR POSITIVE (HCC): Primary | ICD-10-CM

## 2020-09-11 DIAGNOSIS — T45.1X5A CINV (CHEMOTHERAPY-INDUCED NAUSEA AND VOMITING): ICD-10-CM

## 2020-09-11 DIAGNOSIS — R11.2 CINV (CHEMOTHERAPY-INDUCED NAUSEA AND VOMITING): ICD-10-CM

## 2020-09-11 PROCEDURE — 99215 OFFICE O/P EST HI 40 MIN: CPT | Performed by: NURSE PRACTITIONER

## 2020-09-11 RX ORDER — PROCHLORPERAZINE MALEATE 10 MG
10 TABLET ORAL EVERY 6 HOURS PRN
Qty: 30 TABLET | Refills: 3 | Status: SHIPPED | OUTPATIENT
Start: 2020-09-11 | End: 2020-09-11

## 2020-09-11 RX ORDER — CETIRIZINE HYDROCHLORIDE 10 MG/1
10 TABLET ORAL DAILY
COMMUNITY
End: 2020-10-30

## 2020-09-11 RX ORDER — ONDANSETRON HYDROCHLORIDE 8 MG/1
8 TABLET, FILM COATED ORAL EVERY 8 HOURS PRN
Qty: 30 TABLET | Refills: 3 | Status: SHIPPED | OUTPATIENT
Start: 2020-09-11 | End: 2020-10-30

## 2020-09-11 RX ORDER — ONDANSETRON HYDROCHLORIDE 8 MG/1
8 TABLET, FILM COATED ORAL EVERY 8 HOURS PRN
Qty: 30 TABLET | Refills: 3 | Status: SHIPPED | OUTPATIENT
Start: 2020-09-11 | End: 2020-09-11

## 2020-09-11 RX ORDER — PROCHLORPERAZINE MALEATE 10 MG
10 TABLET ORAL EVERY 6 HOURS PRN
Qty: 30 TABLET | Refills: 3 | Status: SHIPPED | OUTPATIENT
Start: 2020-09-11 | End: 2020-10-30

## 2020-09-11 NOTE — PROGRESS NOTES
IV Chemotherapy Education    Learner:  Patient and Spouse    Barriers / Limitations:  None    Chemotherapy education goals:  · Learn the drug names  · Administration schedule  · Routes of administration  · Treatment setting    Drug names:  Docetaxel and cy Information Sheet  Side Effect Management Information Sheet  THE [facility] Support [employer] Information sheet    Patient and care partner were given ample opportunity to ask questions. All questions and concerns addressed.  We discussed s

## 2020-09-15 NOTE — PROGRESS NOTES
[facility]    PATIENT'S NAME: Jimbojeanie Byrd   ATTENDING PHYSICIAN: Colby Martinez M.D.    PATIENT ACCOUNT #: [de-identified] LOCATION: [address] [place] [address] RECORD #: [id number] YOB: 1965   DATE OF SERVICE: 09/08/2020       CANCER indicated and given the fact that she is still premenopausal, we would start with tamoxifen. We also again discussed the fact that her tumor size was small. Her original biopsy demonstrated only about 0.5 mm of invasive tumor.   Her definitive resection s manageable and the risk is low. Her questions were answered. I will see her on the 18th. We talked about the use of steroids the night before and the morning of the treatment; I typically give it b.i.d. for 4 doses starting the night before.   A prescrip

## 2020-09-18 ENCOUNTER — OFFICE VISIT (OUTPATIENT)
Dept: HEMATOLOGY/ONCOLOGY | Facility: HOSPITAL | Age: 55
End: 2020-09-18
Attending: INTERNAL MEDICINE
Payer: COMMERCIAL

## 2020-09-18 VITALS — BODY MASS INDEX: 22.01 KG/M2 | WEIGHT: 152 LBS | HEIGHT: 69.53 IN

## 2020-09-18 VITALS
DIASTOLIC BLOOD PRESSURE: 65 MMHG | HEART RATE: 76 BPM | RESPIRATION RATE: 16 BRPM | TEMPERATURE: 98 F | SYSTOLIC BLOOD PRESSURE: 111 MMHG | WEIGHT: 152 LBS | OXYGEN SATURATION: 100 % | BODY MASS INDEX: 22 KG/M2

## 2020-09-18 DIAGNOSIS — Z17.0 MALIGNANT NEOPLASM OF UPPER-INNER QUADRANT OF RIGHT BREAST IN FEMALE, ESTROGEN RECEPTOR POSITIVE (HCC): Primary | ICD-10-CM

## 2020-09-18 DIAGNOSIS — C50.211 MALIGNANT NEOPLASM OF UPPER-INNER QUADRANT OF RIGHT BREAST IN FEMALE, ESTROGEN RECEPTOR POSITIVE (HCC): Primary | ICD-10-CM

## 2020-09-18 DIAGNOSIS — Z17.0 MALIGNANT NEOPLASM OF UPPER-INNER QUADRANT OF RIGHT BREAST IN FEMALE, ESTROGEN RECEPTOR POSITIVE (HCC): ICD-10-CM

## 2020-09-18 DIAGNOSIS — C50.211 MALIGNANT NEOPLASM OF UPPER-INNER QUADRANT OF RIGHT BREAST IN FEMALE, ESTROGEN RECEPTOR POSITIVE (HCC): ICD-10-CM

## 2020-09-18 LAB
ALBUMIN SERPL-MCNC: 3.9 G/DL (ref 3.4–5)
ALBUMIN/GLOB SERPL: 1 {RATIO} (ref 1–2)
ALP LIVER SERPL-CCNC: 48 U/L (ref 41–108)
ALT SERPL-CCNC: 20 U/L (ref 13–56)
ANION GAP SERPL CALC-SCNC: 2 MMOL/L (ref 0–18)
AST SERPL-CCNC: 17 U/L (ref 15–37)
BASOPHILS # BLD AUTO: 0.07 X10(3) UL (ref 0–0.2)
BASOPHILS NFR BLD AUTO: 1 %
BILIRUB SERPL-MCNC: 0.5 MG/DL (ref 0.1–2)
BUN BLD-MCNC: 15 MG/DL (ref 7–18)
BUN/CREAT SERPL: 16.5 (ref 10–20)
CALCIUM BLD-MCNC: 9.1 MG/DL (ref 8.5–10.1)
CHLORIDE SERPL-SCNC: 106 MMOL/L (ref 98–112)
CO2 SERPL-SCNC: 28 MMOL/L (ref 21–32)
CREAT BLD-MCNC: 0.91 MG/DL (ref 0.55–1.02)
DEPRECATED RDW RBC AUTO: 40.2 FL (ref 35.1–46.3)
EOSINOPHIL # BLD AUTO: 0.34 X10(3) UL (ref 0–0.7)
EOSINOPHIL NFR BLD AUTO: 4.8 %
ERYTHROCYTE [DISTWIDTH] IN BLOOD BY AUTOMATED COUNT: 11.8 % (ref 11–15)
GLOBULIN PLAS-MCNC: 4 G/DL (ref 2.8–4.4)
GLUCOSE BLD-MCNC: 97 MG/DL (ref 70–99)
HCT VFR BLD AUTO: 40.4 % (ref 35–48)
HGB BLD-MCNC: 13.5 G/DL (ref 12–16)
IMM GRANULOCYTES # BLD AUTO: 0.02 X10(3) UL (ref 0–1)
IMM GRANULOCYTES NFR BLD: 0.3 %
LYMPHOCYTES # BLD AUTO: 1.4 X10(3) UL (ref 1–4)
LYMPHOCYTES NFR BLD AUTO: 19.6 %
M PROTEIN MFR SERPL ELPH: 7.9 G/DL (ref 6.4–8.2)
MCH RBC QN AUTO: 31 PG (ref 26–34)
MCHC RBC AUTO-ENTMCNC: 33.4 G/DL (ref 31–37)
MCV RBC AUTO: 92.9 FL (ref 80–100)
MONOCYTES # BLD AUTO: 0.84 X10(3) UL (ref 0.1–1)
MONOCYTES NFR BLD AUTO: 11.8 %
NEUTROPHILS # BLD AUTO: 4.47 X10 (3) UL (ref 1.5–7.7)
NEUTROPHILS # BLD AUTO: 4.47 X10(3) UL (ref 1.5–7.7)
NEUTROPHILS NFR BLD AUTO: 62.5 %
OSMOLALITY SERPL CALC.SUM OF ELEC: 283 MOSM/KG (ref 275–295)
PLATELET # BLD AUTO: 199 10(3)UL (ref 150–450)
POTASSIUM SERPL-SCNC: 3.8 MMOL/L (ref 3.5–5.1)
RBC # BLD AUTO: 4.35 X10(6)UL (ref 3.8–5.3)
SODIUM SERPL-SCNC: 136 MMOL/L (ref 136–145)
WBC # BLD AUTO: 7.1 X10(3) UL (ref 4–11)

## 2020-09-18 PROCEDURE — 96417 CHEMO IV INFUS EACH ADDL SEQ: CPT

## 2020-09-18 PROCEDURE — 99214 OFFICE O/P EST MOD 30 MIN: CPT | Performed by: INTERNAL MEDICINE

## 2020-09-18 PROCEDURE — 96413 CHEMO IV INFUSION 1 HR: CPT

## 2020-09-18 PROCEDURE — 96375 TX/PRO/DX INJ NEW DRUG ADDON: CPT

## 2020-09-18 NOTE — PROGRESS NOTES
Patient is here for MD f/u and first cycle of TC. Patient did not take steroids last night nor this morning. She has antiemetics at home. She is feeling well today.        Education Record    Learner:  Patient    Disease / Diagnosis: breast cancer     Barri

## 2020-09-18 NOTE — PROGRESS NOTES
Pt here for C1D1 Taxotere/Cytoxan.   Arrives Ambulating independently, accompanied by Self           Patient reports possible pregnancy since last therapy cycle: No    Modifications in dose or schedule: No     Frequency of blood return and site check throug

## 2020-09-19 ENCOUNTER — OFFICE VISIT (OUTPATIENT)
Dept: HEMATOLOGY/ONCOLOGY | Facility: HOSPITAL | Age: 55
End: 2020-09-19
Attending: INTERNAL MEDICINE
Payer: COMMERCIAL

## 2020-09-19 DIAGNOSIS — C50.211 MALIGNANT NEOPLASM OF UPPER-INNER QUADRANT OF RIGHT BREAST IN FEMALE, ESTROGEN RECEPTOR POSITIVE (HCC): Primary | ICD-10-CM

## 2020-09-19 DIAGNOSIS — Z17.0 MALIGNANT NEOPLASM OF UPPER-INNER QUADRANT OF RIGHT BREAST IN FEMALE, ESTROGEN RECEPTOR POSITIVE (HCC): Primary | ICD-10-CM

## 2020-09-19 PROCEDURE — 96372 THER/PROPH/DIAG INJ SC/IM: CPT

## 2020-09-19 NOTE — PROGRESS NOTES
Pt tolerated GSF injection well today without incident or complaint. Reviewed possible side effects and pt verbalized understanding.      Education Record    Learner:  Patient    Disease / Diagnosis: breast ca    Barriers / Limitations:  None   Comments:

## 2020-09-20 NOTE — PROGRESS NOTES
[facility]    PATIENT'S NAME: Phoebe Harris   ATTENDING PHYSICIAN: Joseph Roman M.D.    PATIENT ACCOUNT #: [de-identified] LOCATION: [address] [place] [address] RECORD #: [id number] YOB: 1965   DATE OF SERVICE: 09/18/2020       CANCER q.8 h. p.r.n., probiotics duodenum, prochlorperazine 10 mg q.6 h. p.r.n. PHYSICAL EXAMINATION:    GENERAL:  She is a well-appearing female in no acute distress. VITAL SIGNS:  Her performance status is 0. Her weight is 162 pounds.   Blood pressure 111/

## 2020-09-21 ENCOUNTER — TELEPHONE (OUTPATIENT)
Dept: HEMATOLOGY/ONCOLOGY | Facility: HOSPITAL | Age: 55
End: 2020-09-21

## 2020-09-21 NOTE — TELEPHONE ENCOUNTER
called. Patient is having diffuse musculoskeletal pains mainly in the hips and back. She had some intermittent headache. She had peg-filgrastrim. I discussed management with pain meds. She has hydrocodone 5/325. She will take this prn.  I reassured

## 2020-09-22 ENCOUNTER — PATIENT MESSAGE (OUTPATIENT)
Dept: HEMATOLOGY/ONCOLOGY | Facility: HOSPITAL | Age: 55
End: 2020-09-22

## 2020-09-22 DIAGNOSIS — J32.9 SINUS INFECTION: Primary | ICD-10-CM

## 2020-09-22 NOTE — PROGRESS NOTES
Nutrition Consultation    Patient Name: Swetha Menendez  YOB: 1965  Medical Record Number: [id number]   Account Number: [de-identified]  Dietitian: Mel Potter RD, LDN      Date of visit: 9/18/2020    Diet Rx: high protein/quality diet daily., Disp: , Rfl:   •  Albuterol Sulfate HFA (PROAIR HFA) 108 (90 Base) MCG/ACT Inhalation Aero Soln, Inhale 2 puffs into the lungs every 4 (four) hours as needed for Wheezing or Shortness of Breath.  (Patient not taking: Reported on 9/18/2020 ), Disp: 1

## 2020-09-23 ENCOUNTER — TELEPHONE (OUTPATIENT)
Dept: HEMATOLOGY/ONCOLOGY | Facility: HOSPITAL | Age: 55
End: 2020-09-23

## 2020-09-24 NOTE — PROGRESS NOTES
Cancer Center Progress Note    Patient Name: Mook Nair   YOB: 1965   Medical Record Number: [id number]   CSN: [id number]   Date of visit: 9/25/2020   Provider: SHEA Millan  Referring Physician: No ref.  provider found    P meters (09/25 1523)  Pulse: 82 (09/25 1523)  BP: 108/64 (09/25 1523)  Temp: 98.7 °F (37.1 °C) (09/25 1523)  Do Not Use - Resp Rate: --  SpO2: 95 % (09/25 1523)      Medications:    Current Outpatient Medications:   •  cetirizine 10 MG Oral Tab, Take 10 mg mouth daily. , Disp: , Rfl:   •  Probiotic Product (PROBIOTIC DAILY OR), Take by mouth., Disp: , Rfl:   •  Multiple Vitamins-Minerals (MULTI FOR HER) Oral Cap, 1 tab daily, Disp: , Rfl:     Review of Systems:   As in HPI    Physical Examination:  General: A intervention required at this time. Risk level:  High-Breast cancer on chemotherapy, requiring intervention and close monitoring.     Dmitry Scott, [address] [place] [address] [facility]

## 2020-09-25 ENCOUNTER — OFFICE VISIT (OUTPATIENT)
Dept: HEMATOLOGY/ONCOLOGY | Facility: HOSPITAL | Age: 55
End: 2020-09-25
Attending: INTERNAL MEDICINE
Payer: COMMERCIAL

## 2020-09-25 VITALS
SYSTOLIC BLOOD PRESSURE: 108 MMHG | TEMPERATURE: 99 F | RESPIRATION RATE: 16 BRPM | HEART RATE: 82 BPM | HEIGHT: 69.92 IN | DIASTOLIC BLOOD PRESSURE: 64 MMHG | WEIGHT: 151.38 LBS | OXYGEN SATURATION: 95 % | BODY MASS INDEX: 21.67 KG/M2

## 2020-09-25 DIAGNOSIS — R21 RASH AND NONSPECIFIC SKIN ERUPTION: ICD-10-CM

## 2020-09-25 DIAGNOSIS — M89.8X9 BONE PAIN DUE TO G-CSF: ICD-10-CM

## 2020-09-25 DIAGNOSIS — R51.9 GENERALIZED HEADACHE: ICD-10-CM

## 2020-09-25 DIAGNOSIS — C50.211 MALIGNANT NEOPLASM OF UPPER-INNER QUADRANT OF RIGHT BREAST IN FEMALE, ESTROGEN RECEPTOR POSITIVE (HCC): Primary | ICD-10-CM

## 2020-09-25 DIAGNOSIS — Z17.0 MALIGNANT NEOPLASM OF UPPER-INNER QUADRANT OF RIGHT BREAST IN FEMALE, ESTROGEN RECEPTOR POSITIVE (HCC): Primary | ICD-10-CM

## 2020-09-25 LAB
BASOPHILS # BLD: 0 X10(3) UL (ref 0–0.2)
BASOPHILS NFR BLD: 0 %
DEPRECATED RDW RBC AUTO: 42.3 FL (ref 35.1–46.3)
EOSINOPHIL # BLD: 0.21 X10(3) UL (ref 0–0.7)
EOSINOPHIL NFR BLD: 1 %
ERYTHROCYTE [DISTWIDTH] IN BLOOD BY AUTOMATED COUNT: 12.1 % (ref 11–15)
HCT VFR BLD AUTO: 38.7 %
HGB BLD-MCNC: 12.8 G/DL
LYMPHOCYTES NFR BLD: 10 %
LYMPHOCYTES NFR BLD: 2.12 X10(3) UL (ref 1–4)
MCH RBC QN AUTO: 31.4 PG (ref 26–34)
MCHC RBC AUTO-ENTMCNC: 33.1 G/DL (ref 31–37)
MCV RBC AUTO: 95.1 FL
METAMYELOCYTES # BLD: 2.33 X10(3) UL
METAMYELOCYTES NFR BLD: 11 %
MONOCYTES # BLD: 1.48 X10(3) UL (ref 0.1–1)
MONOCYTES NFR BLD: 7 %
MORPHOLOGY: NORMAL
MYELOCYTES # BLD: 1.27 X10(3) UL
MYELOCYTES NFR BLD: 6 %
NEUTROPHILS # BLD AUTO: 10.68 X10 (3) UL (ref 1.5–7.7)
NEUTROPHILS NFR BLD: 60 %
NEUTS BAND NFR BLD: 5 %
NEUTS HYPERSEG # BLD: 13.78 X10(3) UL (ref 1.5–7.7)
PLATELET # BLD AUTO: 180 10(3)UL (ref 150–450)
RBC # BLD AUTO: 4.07 X10(6)UL
TOTAL CELLS COUNTED: 100
WBC # BLD AUTO: 21.2 X10(3) UL (ref 4–11)

## 2020-09-25 PROCEDURE — 99215 OFFICE O/P EST HI 40 MIN: CPT | Performed by: CLINICAL NURSE SPECIALIST

## 2020-09-25 RX ORDER — METHYLPREDNISOLONE 4 MG/1
TABLET ORAL
Qty: 1 PACKAGE | Refills: 0 | Status: SHIPPED | OUTPATIENT
Start: 2020-09-25 | End: 2020-10-09 | Stop reason: ALTCHOICE

## 2020-09-25 NOTE — PROGRESS NOTES
Patient presents with:   Follow - Up: apn assessment    Patient here for D8 of Taxotere/Cytoxan- c/o red raised itchy rash per patient feels like lizard skin onset Wednesday started on chest has spread to neck area and a on small amount on hand,used benadry

## 2020-10-02 RX ORDER — AMOXICILLIN AND CLAVULANATE POTASSIUM 875; 125 MG/1; MG/1
1 TABLET, FILM COATED ORAL 2 TIMES DAILY
Qty: 20 TABLET | Refills: 0 | Status: SHIPPED | OUTPATIENT
Start: 2020-10-02 | End: 2020-10-30

## 2020-10-02 NOTE — TELEPHONE ENCOUNTER
From: Moe Fair  To: Mellisa Foote MD  Sent: 9/22/2020 3:37 PM CDT  Subject: Non-Urgent Gómez Anderson - Is there anything that I can take for really bad headaches?  They are in the lower part of my head/top of my neck and m

## 2020-10-09 ENCOUNTER — OFFICE VISIT (OUTPATIENT)
Dept: HEMATOLOGY/ONCOLOGY | Facility: HOSPITAL | Age: 55
End: 2020-10-09
Attending: INTERNAL MEDICINE
Payer: COMMERCIAL

## 2020-10-09 VITALS
BODY MASS INDEX: 22.33 KG/M2 | HEIGHT: 69.92 IN | SYSTOLIC BLOOD PRESSURE: 108 MMHG | DIASTOLIC BLOOD PRESSURE: 69 MMHG | WEIGHT: 156 LBS | OXYGEN SATURATION: 99 % | TEMPERATURE: 98 F | RESPIRATION RATE: 16 BRPM | HEART RATE: 69 BPM

## 2020-10-09 DIAGNOSIS — Z17.0 MALIGNANT NEOPLASM OF UPPER-INNER QUADRANT OF RIGHT BREAST IN FEMALE, ESTROGEN RECEPTOR POSITIVE (HCC): Primary | ICD-10-CM

## 2020-10-09 DIAGNOSIS — R51.9 GENERALIZED HEADACHE: ICD-10-CM

## 2020-10-09 DIAGNOSIS — M89.8X9 BONE PAIN DUE TO G-CSF: ICD-10-CM

## 2020-10-09 DIAGNOSIS — C50.211 MALIGNANT NEOPLASM OF UPPER-INNER QUADRANT OF RIGHT BREAST IN FEMALE, ESTROGEN RECEPTOR POSITIVE (HCC): Primary | ICD-10-CM

## 2020-10-09 PROCEDURE — 96375 TX/PRO/DX INJ NEW DRUG ADDON: CPT

## 2020-10-09 PROCEDURE — 96413 CHEMO IV INFUSION 1 HR: CPT

## 2020-10-09 PROCEDURE — 99214 OFFICE O/P EST MOD 30 MIN: CPT | Performed by: INTERNAL MEDICINE

## 2020-10-09 PROCEDURE — 96417 CHEMO IV INFUS EACH ADDL SEQ: CPT

## 2020-10-09 NOTE — PROGRESS NOTES
Patient is here for MD f/u and cycle 2 of TC. Patient developed severe headaches and a sinus infection after the first cycle. Appetite is good. Energy level is good. Denies any other complaints. Feeling well today.      Education Record    Learner:  Patient

## 2020-10-10 ENCOUNTER — OFFICE VISIT (OUTPATIENT)
Dept: HEMATOLOGY/ONCOLOGY | Facility: HOSPITAL | Age: 55
End: 2020-10-10
Attending: INTERNAL MEDICINE
Payer: COMMERCIAL

## 2020-10-10 VITALS — TEMPERATURE: 98 F

## 2020-10-10 DIAGNOSIS — Z17.0 MALIGNANT NEOPLASM OF UPPER-INNER QUADRANT OF RIGHT BREAST IN FEMALE, ESTROGEN RECEPTOR POSITIVE (HCC): Primary | ICD-10-CM

## 2020-10-10 DIAGNOSIS — C50.211 MALIGNANT NEOPLASM OF UPPER-INNER QUADRANT OF RIGHT BREAST IN FEMALE, ESTROGEN RECEPTOR POSITIVE (HCC): Primary | ICD-10-CM

## 2020-10-10 PROCEDURE — 96372 THER/PROPH/DIAG INJ SC/IM: CPT

## 2020-10-11 NOTE — PROGRESS NOTES
[facility]    PATIENT'S NAME: Verenice Tatum   ATTENDING PHYSICIAN: Jessica Taylor M.D.    PATIENT ACCOUNT #: [de-identified] LOCATION: [address] [place] [address] RECORD #: [id number] YOB: 1965   DATE OF SERVICE: 10/09/2020       CANCER level has been good. She feels very well today. She is here with her .       MEDICATIONS:  Her current medications include albuterol HFA inhaler 2 puffs q.i.d. p.r.n.; Augmentin, which she is completing for a sinus infection; budesonide inhaler 2 p pain.  She is very unlikely to have cytopenia or infection given her history with the first cycle. I will see her again in 3 weeks for cycle 3.     Dictated By Vivian Lo M.D.  d: 10/10/2020 06:36:23  t: 10/10/2020 22:59:07  [facility] [id number]

## 2020-10-12 NOTE — PROGRESS NOTES
Nutrition F/U Note     Patient Name: Lius Pearson  YOB: 1965  Medical Record Number: [id number]      Account Number: [de-identified]  Dietitian: Lorelei Ashford RD, [place]        Date of visit: 10/9/2020     Diet Rx: high protein/quality diet    capsule by mouth daily. , Disp: , Rfl:   •  Albuterol Sulfate HFA (PROAIR HFA) 108 (90 Base) MCG/ACT Inhalation Aero Soln, Inhale 2 puffs into the lungs every 4 (four) hours as needed for Wheezing or Shortness of Breath.  (Patient not taking: Reported on 9/1

## 2020-10-22 RX ORDER — MINOCYCLINE HYDROCHLORIDE 50 MG/1
50 TABLET ORAL 2 TIMES DAILY
Qty: 60 TABLET | Refills: 2 | Status: SHIPPED | OUTPATIENT
Start: 2020-10-22 | End: 2020-11-20 | Stop reason: ALTCHOICE

## 2020-10-30 ENCOUNTER — OFFICE VISIT (OUTPATIENT)
Dept: HEMATOLOGY/ONCOLOGY | Facility: HOSPITAL | Age: 55
End: 2020-10-30
Attending: INTERNAL MEDICINE
Payer: COMMERCIAL

## 2020-10-30 VITALS
RESPIRATION RATE: 18 BRPM | SYSTOLIC BLOOD PRESSURE: 106 MMHG | HEIGHT: 69.53 IN | TEMPERATURE: 98 F | HEART RATE: 72 BPM | BODY MASS INDEX: 23.05 KG/M2 | OXYGEN SATURATION: 99 % | DIASTOLIC BLOOD PRESSURE: 70 MMHG | WEIGHT: 159.19 LBS

## 2020-10-30 DIAGNOSIS — Z17.0 MALIGNANT NEOPLASM OF UPPER-INNER QUADRANT OF RIGHT BREAST IN FEMALE, ESTROGEN RECEPTOR POSITIVE (HCC): ICD-10-CM

## 2020-10-30 DIAGNOSIS — Z17.0 MALIGNANT NEOPLASM OF UPPER-INNER QUADRANT OF RIGHT BREAST IN FEMALE, ESTROGEN RECEPTOR POSITIVE (HCC): Primary | ICD-10-CM

## 2020-10-30 DIAGNOSIS — C50.211 MALIGNANT NEOPLASM OF UPPER-INNER QUADRANT OF RIGHT BREAST IN FEMALE, ESTROGEN RECEPTOR POSITIVE (HCC): Primary | ICD-10-CM

## 2020-10-30 DIAGNOSIS — C50.211 MALIGNANT NEOPLASM OF UPPER-INNER QUADRANT OF RIGHT BREAST IN FEMALE, ESTROGEN RECEPTOR POSITIVE (HCC): ICD-10-CM

## 2020-10-30 PROCEDURE — 96413 CHEMO IV INFUSION 1 HR: CPT

## 2020-10-30 PROCEDURE — 99214 OFFICE O/P EST MOD 30 MIN: CPT | Performed by: INTERNAL MEDICINE

## 2020-10-30 PROCEDURE — 96417 CHEMO IV INFUS EACH ADDL SEQ: CPT

## 2020-10-30 PROCEDURE — 96367 TX/PROPH/DG ADDL SEQ IV INF: CPT

## 2020-10-30 NOTE — PROGRESS NOTES
Patient is here for MD f/u and cycle 3 of chemo. Patient developed folliculitis on her scalp. She started on Minocycline and took until it was resolved. Patient c/o dye eyes and twitching frequently. Patient states she continues to gain weight.  Eating well

## 2020-10-31 ENCOUNTER — OFFICE VISIT (OUTPATIENT)
Dept: HEMATOLOGY/ONCOLOGY | Facility: HOSPITAL | Age: 55
End: 2020-10-31
Attending: INTERNAL MEDICINE
Payer: COMMERCIAL

## 2020-10-31 DIAGNOSIS — C50.211 MALIGNANT NEOPLASM OF UPPER-INNER QUADRANT OF RIGHT BREAST IN FEMALE, ESTROGEN RECEPTOR POSITIVE (HCC): Primary | ICD-10-CM

## 2020-10-31 DIAGNOSIS — Z17.0 MALIGNANT NEOPLASM OF UPPER-INNER QUADRANT OF RIGHT BREAST IN FEMALE, ESTROGEN RECEPTOR POSITIVE (HCC): Primary | ICD-10-CM

## 2020-10-31 PROCEDURE — 96372 THER/PROPH/DIAG INJ SC/IM: CPT

## 2020-11-02 NOTE — PROGRESS NOTES
[facility]    PATIENT'S NAME: Morenita Patricio   ATTENDING PHYSICIAN: Vivian Lo M.D.    PATIENT ACCOUNT #: [de-identified] LOCATION: [address] [place] [address] RECORD #: [id number] YOB: 1965   DATE OF SERVICE: 10/30/2020       CANCER temperature is 97.9 degrees. HEENT:  Alopecia. She has slightly irritated conjunctivae. She has no oral lesions. LYMPHATICS:  She has no cervical, supraclavicular, or axillary adenopathy. LUNGS:  Resonant to percussion and clear to auscultation.   No w

## 2020-11-20 ENCOUNTER — OFFICE VISIT (OUTPATIENT)
Dept: HEMATOLOGY/ONCOLOGY | Facility: HOSPITAL | Age: 55
End: 2020-11-20
Attending: INTERNAL MEDICINE
Payer: COMMERCIAL

## 2020-11-20 VITALS
HEIGHT: 69.53 IN | DIASTOLIC BLOOD PRESSURE: 72 MMHG | WEIGHT: 161.63 LBS | BODY MASS INDEX: 23.4 KG/M2 | OXYGEN SATURATION: 97 % | TEMPERATURE: 98 F | HEART RATE: 79 BPM | SYSTOLIC BLOOD PRESSURE: 114 MMHG | RESPIRATION RATE: 16 BRPM

## 2020-11-20 DIAGNOSIS — Z17.0 MALIGNANT NEOPLASM OF UPPER-INNER QUADRANT OF RIGHT BREAST IN FEMALE, ESTROGEN RECEPTOR POSITIVE (HCC): Primary | ICD-10-CM

## 2020-11-20 DIAGNOSIS — C50.211 MALIGNANT NEOPLASM OF UPPER-INNER QUADRANT OF RIGHT BREAST IN FEMALE, ESTROGEN RECEPTOR POSITIVE (HCC): ICD-10-CM

## 2020-11-20 DIAGNOSIS — C50.211 MALIGNANT NEOPLASM OF UPPER-INNER QUADRANT OF RIGHT BREAST IN FEMALE, ESTROGEN RECEPTOR POSITIVE (HCC): Primary | ICD-10-CM

## 2020-11-20 DIAGNOSIS — Z17.0 MALIGNANT NEOPLASM OF UPPER-INNER QUADRANT OF RIGHT BREAST IN FEMALE, ESTROGEN RECEPTOR POSITIVE (HCC): ICD-10-CM

## 2020-11-20 PROCEDURE — 96375 TX/PRO/DX INJ NEW DRUG ADDON: CPT

## 2020-11-20 PROCEDURE — 96417 CHEMO IV INFUS EACH ADDL SEQ: CPT

## 2020-11-20 PROCEDURE — 99214 OFFICE O/P EST MOD 30 MIN: CPT | Performed by: INTERNAL MEDICINE

## 2020-11-20 PROCEDURE — 96413 CHEMO IV INFUSION 1 HR: CPT

## 2020-11-20 NOTE — PROGRESS NOTES
Patient is here for MD fish/aren and cycle 4 of chemo. Patient reports occasional headaches. Sundays and Mondays after chemo are usually her \"bad days\" She did not sleep well after last cycle. Eating ok. Energy level is good. Denies pain.  She is scheduled to s

## 2020-11-21 ENCOUNTER — OFFICE VISIT (OUTPATIENT)
Dept: HEMATOLOGY/ONCOLOGY | Facility: HOSPITAL | Age: 55
End: 2020-11-21
Attending: INTERNAL MEDICINE
Payer: COMMERCIAL

## 2020-11-21 DIAGNOSIS — Z17.0 MALIGNANT NEOPLASM OF UPPER-INNER QUADRANT OF RIGHT BREAST IN FEMALE, ESTROGEN RECEPTOR POSITIVE (HCC): Primary | ICD-10-CM

## 2020-11-21 DIAGNOSIS — C50.211 MALIGNANT NEOPLASM OF UPPER-INNER QUADRANT OF RIGHT BREAST IN FEMALE, ESTROGEN RECEPTOR POSITIVE (HCC): Primary | ICD-10-CM

## 2020-11-21 PROCEDURE — 96372 THER/PROPH/DIAG INJ SC/IM: CPT

## 2020-11-21 NOTE — PROGRESS NOTES
Education Record    Learner:  Patient    Disease / Pauline Alcala injection    Barriers / Limitations:  None   Comments:    Method:  Discussion   Comments:    General Topics:  Medication, Side effects and symptom management and Plan of care reviewed

## 2020-11-23 NOTE — PROGRESS NOTES
[facility]    PATIENT'S NAME: Alan Tristan   ATTENDING PHYSICIAN: Yuli Winchester M.D.    PATIENT ACCOUNT #: [de-identified] LOCATION: [address] [place] [address] RECORD #: [id number] YOB: 1965   DATE OF SERVICE: 11/20/2020       CANCER supraclavicular, or axillary adenopathy. LUNGS:  Resonant to percussion and clear to auscultation. No wheezing, rales, or rhonchi. HEART:  Normal.    ABDOMEN:  No hepatosplenomegaly or tenderness.   EXTREMITIES:  She has no clubbing, cyanosis, or edema

## 2020-12-09 NOTE — PROGRESS NOTES
Nursing Re- Consult Note    Patient: Romelia Calderon  YOB: 1965  Age: 50year old  Patria Antony MD  Referring Physician: Dr. Martinez Mata  Diagnosis: RIGHT BREAST  Consult Date: 12/10/2020

## 2020-12-10 ENCOUNTER — HOSPITAL ENCOUNTER (OUTPATIENT)
Dept: RADIATION ONCOLOGY | Facility: HOSPITAL | Age: 55
Discharge: HOME | End: 2020-12-10
Attending: INTERNAL MEDICINE
Payer: COMMERCIAL

## 2020-12-10 VITALS
SYSTOLIC BLOOD PRESSURE: 103 MMHG | HEART RATE: 68 BPM | OXYGEN SATURATION: 98 % | BODY MASS INDEX: 23.55 KG/M2 | WEIGHT: 162.63 LBS | RESPIRATION RATE: 16 BRPM | DIASTOLIC BLOOD PRESSURE: 66 MMHG | HEIGHT: 69.69 IN | TEMPERATURE: 98 F

## 2020-12-10 DIAGNOSIS — C50.211 MALIGNANT NEOPLASM OF UPPER-INNER QUADRANT OF RIGHT BREAST IN FEMALE, ESTROGEN RECEPTOR POSITIVE (HCC): ICD-10-CM

## 2020-12-10 DIAGNOSIS — Z17.0 MALIGNANT NEOPLASM OF UPPER-INNER QUADRANT OF RIGHT BREAST IN FEMALE, ESTROGEN RECEPTOR POSITIVE (HCC): ICD-10-CM

## 2020-12-10 PROCEDURE — 99214 OFFICE O/P EST MOD 30 MIN: CPT

## 2020-12-10 RX ORDER — MOMETASONE FUROATE 1 MG/G
CREAM TOPICAL
Qty: 50 G | Refills: 3 | Status: SHIPPED | OUTPATIENT
Start: 2020-12-10 | End: 2021-02-12

## 2020-12-10 NOTE — PROGRESS NOTES
[facility]  Radiation Oncology Follow-up    Patient Name: Carmel Desir   MRN: [id number]  Referring Physician: Phyllis Antony MD; Sandi Tubbs MD    Diagnosis: R breast IDC, grade 3, ER/MO+, HER2-, Ki-67 25%, pT1a (4mm) N0 (0/1) cM0, stage IA. Oncotype was high and she has now completed 4 cycles of adjuvant TC chemotherapy.      Plan: I discussed the above clinical situation with the patient and her  at the time of consultation.      I recommended a course of adjuvant extern

## 2020-12-11 ENCOUNTER — HOSPITAL ENCOUNTER (OUTPATIENT)
Dept: RADIATION ONCOLOGY | Facility: HOSPITAL | Age: 55
Discharge: HOME | End: 2020-12-11
Attending: INTERNAL MEDICINE
Payer: COMMERCIAL

## 2020-12-11 PROCEDURE — 77290 THER RAD SIMULAJ FIELD CPLX: CPT | Performed by: INTERNAL MEDICINE

## 2020-12-11 PROCEDURE — 77334 RADIATION TREATMENT AID(S): CPT | Performed by: INTERNAL MEDICINE

## 2020-12-11 NOTE — PROCEDURES
[facility] HOSP - [facility]  Procedure Note    [address]  Patient Status:  Outpatient    1965 MRN [id number]   Location [place] [address] Attending Miranda Camargo MD   Hosp Day # 0 PCP Nahed Leija DO     Procedure: Right b Doc Mccarty NP

## 2020-12-18 PROCEDURE — 77334 RADIATION TREATMENT AID(S): CPT | Performed by: INTERNAL MEDICINE

## 2020-12-18 PROCEDURE — 77300 RADIATION THERAPY DOSE PLAN: CPT | Performed by: INTERNAL MEDICINE

## 2020-12-18 PROCEDURE — 77295 3-D RADIOTHERAPY PLAN: CPT | Performed by: INTERNAL MEDICINE

## 2020-12-29 ENCOUNTER — HOSPITAL ENCOUNTER (OUTPATIENT)
Dept: RADIATION ONCOLOGY | Facility: HOSPITAL | Age: 55
Discharge: HOME | End: 2020-12-29
Attending: INTERNAL MEDICINE
Payer: COMMERCIAL

## 2020-12-29 PROCEDURE — 77412 RADIATION TX DELIVERY LVL 3: CPT | Performed by: INTERNAL MEDICINE

## 2020-12-29 PROCEDURE — 77280 THER RAD SIMULAJ FIELD SMPL: CPT | Performed by: INTERNAL MEDICINE

## 2020-12-30 PROCEDURE — 77387 GUIDANCE FOR RADJ TX DLVR: CPT | Performed by: INTERNAL MEDICINE

## 2020-12-30 PROCEDURE — 77412 RADIATION TX DELIVERY LVL 3: CPT | Performed by: INTERNAL MEDICINE

## 2020-12-31 ENCOUNTER — HOSPITAL ENCOUNTER (OUTPATIENT)
Dept: RADIATION ONCOLOGY | Facility: HOSPITAL | Age: 55
Discharge: HOME | End: 2020-12-31
Attending: INTERNAL MEDICINE
Payer: COMMERCIAL

## 2020-12-31 DIAGNOSIS — Z17.0 MALIGNANT NEOPLASM OF UPPER-INNER QUADRANT OF RIGHT BREAST IN FEMALE, ESTROGEN RECEPTOR POSITIVE (HCC): Primary | ICD-10-CM

## 2020-12-31 DIAGNOSIS — C50.211 MALIGNANT NEOPLASM OF UPPER-INNER QUADRANT OF RIGHT BREAST IN FEMALE, ESTROGEN RECEPTOR POSITIVE (HCC): Primary | ICD-10-CM

## 2020-12-31 PROCEDURE — 77412 RADIATION TX DELIVERY LVL 3: CPT | Performed by: INTERNAL MEDICINE

## 2020-12-31 PROCEDURE — 77387 GUIDANCE FOR RADJ TX DLVR: CPT | Performed by: INTERNAL MEDICINE

## 2020-12-31 NOTE — PROGRESS NOTES
[facility] Radiation Treatment Management Note 1-5    Patient:  Swetha Menendez  Age:  50year old  Visit Diagnosis:  R breast IDC, grade 3, stage IA, ER/KY+  Primary Rad/Onc:  Dr. Pike Side    Site Delivered Dose (cGy) Pre

## 2021-01-01 ENCOUNTER — HOSPITAL ENCOUNTER (OUTPATIENT)
Dept: RADIATION ONCOLOGY | Facility: HOSPITAL | Age: 56
Discharge: HOME | End: 2021-01-01
Attending: INTERNAL MEDICINE
Payer: COMMERCIAL

## 2021-01-04 PROCEDURE — 77387 GUIDANCE FOR RADJ TX DLVR: CPT | Performed by: INTERNAL MEDICINE

## 2021-01-04 PROCEDURE — 77412 RADIATION TX DELIVERY LVL 3: CPT | Performed by: INTERNAL MEDICINE

## 2021-01-05 PROCEDURE — 77387 GUIDANCE FOR RADJ TX DLVR: CPT | Performed by: INTERNAL MEDICINE

## 2021-01-05 PROCEDURE — 77412 RADIATION TX DELIVERY LVL 3: CPT | Performed by: INTERNAL MEDICINE

## 2021-01-06 PROCEDURE — 77387 GUIDANCE FOR RADJ TX DLVR: CPT | Performed by: INTERNAL MEDICINE

## 2021-01-06 PROCEDURE — 77412 RADIATION TX DELIVERY LVL 3: CPT | Performed by: INTERNAL MEDICINE

## 2021-01-07 ENCOUNTER — LAB ENCOUNTER (OUTPATIENT)
Dept: LAB | Facility: HOSPITAL | Age: 56
End: 2021-01-07
Attending: INTERNAL MEDICINE
Payer: COMMERCIAL

## 2021-01-07 DIAGNOSIS — C50.211 MALIGNANT NEOPLASM OF UPPER-INNER QUADRANT OF RIGHT BREAST IN FEMALE, ESTROGEN RECEPTOR POSITIVE (HCC): ICD-10-CM

## 2021-01-07 DIAGNOSIS — Z17.0 MALIGNANT NEOPLASM OF UPPER-INNER QUADRANT OF RIGHT BREAST IN FEMALE, ESTROGEN RECEPTOR POSITIVE (HCC): ICD-10-CM

## 2021-01-07 PROCEDURE — 77412 RADIATION TX DELIVERY LVL 3: CPT | Performed by: INTERNAL MEDICINE

## 2021-01-07 PROCEDURE — 77387 GUIDANCE FOR RADJ TX DLVR: CPT | Performed by: INTERNAL MEDICINE

## 2021-01-08 ENCOUNTER — HOSPITAL ENCOUNTER (OUTPATIENT)
Dept: RADIATION ONCOLOGY | Facility: HOSPITAL | Age: 56
Discharge: HOME | End: 2021-01-08
Attending: INTERNAL MEDICINE
Payer: COMMERCIAL

## 2021-01-08 VITALS
HEART RATE: 65 BPM | SYSTOLIC BLOOD PRESSURE: 115 MMHG | WEIGHT: 155.63 LBS | DIASTOLIC BLOOD PRESSURE: 62 MMHG | RESPIRATION RATE: 18 BRPM | TEMPERATURE: 98 F | OXYGEN SATURATION: 100 % | BODY MASS INDEX: 23 KG/M2

## 2021-01-08 DIAGNOSIS — Z17.0 MALIGNANT NEOPLASM OF UPPER-INNER QUADRANT OF RIGHT BREAST IN FEMALE, ESTROGEN RECEPTOR POSITIVE (HCC): Primary | ICD-10-CM

## 2021-01-08 DIAGNOSIS — C50.211 MALIGNANT NEOPLASM OF UPPER-INNER QUADRANT OF RIGHT BREAST IN FEMALE, ESTROGEN RECEPTOR POSITIVE (HCC): Primary | ICD-10-CM

## 2021-01-08 LAB — SARS-COV-2 RNA RESP QL NAA+PROBE: NOT DETECTED

## 2021-01-08 PROCEDURE — 77387 GUIDANCE FOR RADJ TX DLVR: CPT | Performed by: INTERNAL MEDICINE

## 2021-01-08 PROCEDURE — 77336 RADIATION PHYSICS CONSULT: CPT | Performed by: INTERNAL MEDICINE

## 2021-01-08 PROCEDURE — 77412 RADIATION TX DELIVERY LVL 3: CPT | Performed by: INTERNAL MEDICINE

## 2021-01-08 NOTE — PROGRESS NOTES
[facility] Radiation Treatment Management Note 6-10    Patient:  Marinatyrone Brochure  Age:  50year old  Visit Diagnosis:  R breast IDC, grade 3, stage IA, ER/UT+  Primary Rad/Onc:  Dr. Gwen Faith    Site Delivered Dose (cGy) Pr

## 2021-01-11 PROCEDURE — 77321 SPECIAL TELETX PORT PLAN: CPT | Performed by: INTERNAL MEDICINE

## 2021-01-11 PROCEDURE — 77412 RADIATION TX DELIVERY LVL 3: CPT | Performed by: INTERNAL MEDICINE

## 2021-01-11 PROCEDURE — 77290 THER RAD SIMULAJ FIELD CPLX: CPT | Performed by: INTERNAL MEDICINE

## 2021-01-11 PROCEDURE — 77334 RADIATION TREATMENT AID(S): CPT | Performed by: INTERNAL MEDICINE

## 2021-01-12 PROCEDURE — 77387 GUIDANCE FOR RADJ TX DLVR: CPT | Performed by: INTERNAL MEDICINE

## 2021-01-12 PROCEDURE — 77412 RADIATION TX DELIVERY LVL 3: CPT | Performed by: INTERNAL MEDICINE

## 2021-01-13 PROCEDURE — 77387 GUIDANCE FOR RADJ TX DLVR: CPT | Performed by: INTERNAL MEDICINE

## 2021-01-13 PROCEDURE — 77412 RADIATION TX DELIVERY LVL 3: CPT | Performed by: INTERNAL MEDICINE

## 2021-01-14 PROCEDURE — 77387 GUIDANCE FOR RADJ TX DLVR: CPT | Performed by: INTERNAL MEDICINE

## 2021-01-14 PROCEDURE — 77412 RADIATION TX DELIVERY LVL 3: CPT | Performed by: INTERNAL MEDICINE

## 2021-01-15 ENCOUNTER — HOSPITAL ENCOUNTER (OUTPATIENT)
Dept: RADIATION ONCOLOGY | Facility: HOSPITAL | Age: 56
Discharge: HOME | End: 2021-01-15
Attending: INTERNAL MEDICINE
Payer: COMMERCIAL

## 2021-01-15 VITALS
OXYGEN SATURATION: 100 % | RESPIRATION RATE: 18 BRPM | HEART RATE: 65 BPM | SYSTOLIC BLOOD PRESSURE: 114 MMHG | DIASTOLIC BLOOD PRESSURE: 73 MMHG | BODY MASS INDEX: 22 KG/M2 | TEMPERATURE: 98 F | WEIGHT: 149.81 LBS

## 2021-01-15 DIAGNOSIS — C50.211 MALIGNANT NEOPLASM OF UPPER-INNER QUADRANT OF RIGHT BREAST IN FEMALE, ESTROGEN RECEPTOR POSITIVE (HCC): Primary | ICD-10-CM

## 2021-01-15 DIAGNOSIS — Z17.0 MALIGNANT NEOPLASM OF UPPER-INNER QUADRANT OF RIGHT BREAST IN FEMALE, ESTROGEN RECEPTOR POSITIVE (HCC): Primary | ICD-10-CM

## 2021-01-15 PROCEDURE — 77387 GUIDANCE FOR RADJ TX DLVR: CPT | Performed by: INTERNAL MEDICINE

## 2021-01-15 PROCEDURE — 77336 RADIATION PHYSICS CONSULT: CPT | Performed by: INTERNAL MEDICINE

## 2021-01-15 PROCEDURE — 77412 RADIATION TX DELIVERY LVL 3: CPT | Performed by: INTERNAL MEDICINE

## 2021-01-15 NOTE — PROGRESS NOTES
[facility] Radiation Treatment Management Note 11-15    Patient:  Tenisha Valenzuela  Age:  50year old  Visit Diagnosis:  R breast IDC, grade 3, stage IA, ER/DC+  Primary Rad/Onc:  Dr. Brandon Huerta    Site Delivered Dose (cGy) P

## 2021-01-18 PROCEDURE — 77412 RADIATION TX DELIVERY LVL 3: CPT | Performed by: INTERNAL MEDICINE

## 2021-01-18 PROCEDURE — 77387 GUIDANCE FOR RADJ TX DLVR: CPT | Performed by: INTERNAL MEDICINE

## 2021-01-19 ENCOUNTER — HOSPITAL ENCOUNTER (OUTPATIENT)
Dept: RADIATION ONCOLOGY | Facility: HOSPITAL | Age: 56
Discharge: HOME | End: 2021-01-19
Attending: INTERNAL MEDICINE
Payer: COMMERCIAL

## 2021-01-19 PROCEDURE — 77412 RADIATION TX DELIVERY LVL 3: CPT | Performed by: INTERNAL MEDICINE

## 2021-01-19 PROCEDURE — 77280 THER RAD SIMULAJ FIELD SMPL: CPT | Performed by: INTERNAL MEDICINE

## 2021-01-19 PROCEDURE — 77332 RADIATION TREATMENT AID(S): CPT | Performed by: INTERNAL MEDICINE

## 2021-01-20 PROCEDURE — 77412 RADIATION TX DELIVERY LVL 3: CPT | Performed by: INTERNAL MEDICINE

## 2021-01-20 PROCEDURE — 77387 GUIDANCE FOR RADJ TX DLVR: CPT | Performed by: INTERNAL MEDICINE

## 2021-01-21 PROCEDURE — 77387 GUIDANCE FOR RADJ TX DLVR: CPT | Performed by: INTERNAL MEDICINE

## 2021-01-21 PROCEDURE — 77412 RADIATION TX DELIVERY LVL 3: CPT | Performed by: INTERNAL MEDICINE

## 2021-01-21 NOTE — PATIENT INSTRUCTIONS
POST-RADIATION INSTRUCTIONS:   - CALL [phone] FOR A FOLLOW-UP WITH DR. LAI 1 MONTH AFTER RADIATION COMPLETION  - CALL TO MAKE AN APPOINTMENT FOR A \"SURVIVORSHIP\" visit with Grazyna FOREMAN  - Follow up with Dr Ana Paniagua for 6 month visit in May

## 2021-01-22 ENCOUNTER — HOSPITAL ENCOUNTER (OUTPATIENT)
Dept: RADIATION ONCOLOGY | Facility: HOSPITAL | Age: 56
Discharge: HOME | End: 2021-01-22
Attending: INTERNAL MEDICINE
Payer: COMMERCIAL

## 2021-01-22 VITALS
TEMPERATURE: 98 F | OXYGEN SATURATION: 100 % | RESPIRATION RATE: 18 BRPM | HEART RATE: 65 BPM | SYSTOLIC BLOOD PRESSURE: 120 MMHG | DIASTOLIC BLOOD PRESSURE: 84 MMHG

## 2021-01-22 DIAGNOSIS — C50.211 MALIGNANT NEOPLASM OF UPPER-INNER QUADRANT OF RIGHT BREAST IN FEMALE, ESTROGEN RECEPTOR POSITIVE (HCC): Primary | ICD-10-CM

## 2021-01-22 DIAGNOSIS — Z17.0 MALIGNANT NEOPLASM OF UPPER-INNER QUADRANT OF RIGHT BREAST IN FEMALE, ESTROGEN RECEPTOR POSITIVE (HCC): Primary | ICD-10-CM

## 2021-01-22 PROCEDURE — 77336 RADIATION PHYSICS CONSULT: CPT | Performed by: INTERNAL MEDICINE

## 2021-01-22 PROCEDURE — 77387 GUIDANCE FOR RADJ TX DLVR: CPT | Performed by: INTERNAL MEDICINE

## 2021-01-22 PROCEDURE — 77412 RADIATION TX DELIVERY LVL 3: CPT | Performed by: INTERNAL MEDICINE

## 2021-01-22 NOTE — PROGRESS NOTES
[facility] Radiation Treatment Management Note 11-15    Patient:  Sotero Bruno  Age:  50year old  Visit Diagnosis:  R breast IDC, grade 3, stage IA, ER/NC+  Primary Rad/Onc:  Dr. Alyssa Booth    Site Delivered Dose (cGy) P

## 2021-01-25 PROCEDURE — 77387 GUIDANCE FOR RADJ TX DLVR: CPT | Performed by: INTERNAL MEDICINE

## 2021-01-25 PROCEDURE — 77412 RADIATION TX DELIVERY LVL 3: CPT | Performed by: INTERNAL MEDICINE

## 2021-01-26 ENCOUNTER — TELEPHONE (OUTPATIENT)
Dept: SURGERY | Facility: CLINIC | Age: 56
End: 2021-01-26

## 2021-01-26 ENCOUNTER — DOCUMENTATION ONLY (OUTPATIENT)
Dept: RADIATION ONCOLOGY | Facility: HOSPITAL | Age: 56
End: 2021-01-26

## 2021-01-26 PROCEDURE — 77387 GUIDANCE FOR RADJ TX DLVR: CPT | Performed by: INTERNAL MEDICINE

## 2021-01-26 PROCEDURE — 77412 RADIATION TX DELIVERY LVL 3: CPT | Performed by: INTERNAL MEDICINE

## 2021-01-26 NOTE — TELEPHONE ENCOUNTER
Attempted to call pt back regarding setting up an appt  No answer. LVM after verifying verbal release.   Informed pt that she is to schedule her mammogram first, and then call our office back to schedule with Dr. Jeffrey Vo so the imaging is complete for her ap

## 2021-01-27 NOTE — PROGRESS NOTES
Radiation Oncology Treatment Summary    Diagnosis: R breast IDC, grade 3, ER/GA+, HER2-, Ki-67 25%, pT1a (4mm) N0 (0/1) cM0, stage IA    Site:   1. Right breast  2.   Right breast boost    Dose:   1.  4005 cGy in 15 fractions  2.  1000 cGy in 5 fractions

## 2021-02-03 ENCOUNTER — TELEPHONE (OUTPATIENT)
Dept: HEMATOLOGY/ONCOLOGY | Facility: HOSPITAL | Age: 56
End: 2021-02-03

## 2021-02-12 ENCOUNTER — TELEMEDICINE (OUTPATIENT)
Dept: HEMATOLOGY/ONCOLOGY | Facility: HOSPITAL | Age: 56
End: 2021-02-12
Attending: NURSE PRACTITIONER
Payer: COMMERCIAL

## 2021-02-12 DIAGNOSIS — Z85.3 PERSONAL HISTORY OF BREAST CANCER: ICD-10-CM

## 2021-02-12 DIAGNOSIS — Z71.9 COUNSELING, UNSPECIFIED: ICD-10-CM

## 2021-02-12 DIAGNOSIS — Z08 ENCOUNTER FOR FOLLOW-UP EXAMINATION AFTER COMPLETED TREATMENT FOR MALIGNANT NEOPLASM: Primary | ICD-10-CM

## 2021-02-12 PROCEDURE — 99215 OFFICE O/P EST HI 40 MIN: CPT | Performed by: NURSE PRACTITIONER

## 2021-02-12 RX ORDER — AMOXICILLIN 250 MG
CAPSULE ORAL DAILY
COMMUNITY
End: 2021-05-14

## 2021-02-12 NOTE — PROGRESS NOTES
Virtual/Video Visit  Yahaira verbally consents to a Video phone visit service on 2/12/2021. She understands and accepts financial responsibility for any deductible, co-insurance and/or co-pays associated with this service.  Two-way real time interactive strength training in her home. Reviewed Cancer Surveillance (office visits, imaging, other) and that Dr. Melanie Gotti will oversee this care. She is scheduled for a right mammogram on 2/15 followed by a Dr. Arelis Nelson visit on 2/23.  She will see  programming available and includes: nutrition and exercise classes, mind/body programs, education, support groups  -[facility] [place] pandemic, virtual programming available and includes: education, support groups, special programs, nutriti

## 2021-02-15 ENCOUNTER — HOSPITAL ENCOUNTER (OUTPATIENT)
Dept: MAMMOGRAPHY | Facility: HOSPITAL | Age: 56
Discharge: HOME | End: 2021-02-15
Attending: SURGERY
Payer: COMMERCIAL

## 2021-02-15 DIAGNOSIS — C50.211 MALIGNANT NEOPLASM OF UPPER-INNER QUADRANT OF RIGHT BREAST IN FEMALE, ESTROGEN RECEPTOR POSITIVE (HCC): ICD-10-CM

## 2021-02-15 DIAGNOSIS — Z17.0 MALIGNANT NEOPLASM OF UPPER-INNER QUADRANT OF RIGHT BREAST IN FEMALE, ESTROGEN RECEPTOR POSITIVE (HCC): ICD-10-CM

## 2021-02-15 PROCEDURE — 77065 DX MAMMO INCL CAD UNI: CPT | Performed by: SURGERY

## 2021-02-15 PROCEDURE — 76641 ULTRASOUND BREAST COMPLETE: CPT | Performed by: SURGERY

## 2021-02-15 PROCEDURE — 77061 BREAST TOMOSYNTHESIS UNI: CPT | Performed by: SURGERY

## 2021-02-22 NOTE — PROGRESS NOTES
Breast Surgery Surveillance Visit    Diagnosis: Right breast cancer status post lumpectomy and sentinel lymph node biopsy on August 13, 2020.     Stage: Cancer Staging  Malignant neoplasm of upper-inner quadrant of right breast in female, estrogen receptor her last visit she has not completed chemotherapy and radiation. She is tolerating tamoxifen per medical oncology. She is here today for evaluation and recommendations for further therapy.         Past Medical History:   Diagnosis Date   • Allergic rhinit Relation Age of Onset   • Breast Cancer Paternal Aunt 62   • Other (Bone Cancer) Paternal Aunt 80   • Diabetes Maternal Grandmother    • Colon Cancer Maternal Grandmother         Liver and Pancreatic CA, unknown age for dx   • Hypertension Father    • Pros near-fainting, difficulty breathing when lying flat, SOB/Coughing at night, swelling of the legs or chest pain while walking.     Breasts:  See history of present illness    Gastrointestinal:     There is no history of difficulty or pain with swallowing, re age. Her speech patterns and movements are normal. Her affect is appropriate.     HEENT: The head is normocephalic. The neck is supple. The thyroid is not enlarged and is without palpable masses/nodules. There are no palpable masses.  The trachea is in the receptor positive ([facility] [address]  Staging form: Breast, AJCC 8th Edition  - Pathologic stage from 9/18/2020: Stage IA (pT1a, pN0, cM0, G3, ER+, NV+, HER2-, Oncotype DX score: 28) - Signed by Neeraj Palmer MD on 9/20/2020    Discussion and Plan:  I had a discus

## 2021-02-23 ENCOUNTER — OFFICE VISIT (OUTPATIENT)
Dept: SURGERY | Facility: CLINIC | Age: 56
End: 2021-02-23
Payer: COMMERCIAL

## 2021-02-23 VITALS — HEART RATE: 73 BPM | SYSTOLIC BLOOD PRESSURE: 122 MMHG | DIASTOLIC BLOOD PRESSURE: 72 MMHG | RESPIRATION RATE: 16 BRPM

## 2021-02-23 DIAGNOSIS — R92.2 DENSE BREAST: ICD-10-CM

## 2021-02-23 DIAGNOSIS — Z17.0 MALIGNANT NEOPLASM OF UPPER-INNER QUADRANT OF RIGHT BREAST IN FEMALE, ESTROGEN RECEPTOR POSITIVE (HCC): Primary | ICD-10-CM

## 2021-02-23 DIAGNOSIS — C50.211 MALIGNANT NEOPLASM OF UPPER-INNER QUADRANT OF RIGHT BREAST IN FEMALE, ESTROGEN RECEPTOR POSITIVE (HCC): Primary | ICD-10-CM

## 2021-02-23 PROCEDURE — 99214 OFFICE O/P EST MOD 30 MIN: CPT | Performed by: SURGERY

## 2021-02-23 PROCEDURE — 3074F SYST BP LT 130 MM HG: CPT | Performed by: SURGERY

## 2021-02-23 PROCEDURE — 3078F DIAST BP <80 MM HG: CPT | Performed by: SURGERY

## 2021-02-25 ENCOUNTER — APPOINTMENT (OUTPATIENT)
Dept: RADIATION ONCOLOGY | Facility: HOSPITAL | Age: 56
End: 2021-02-25
Attending: INTERNAL MEDICINE
Payer: COMMERCIAL

## 2021-02-26 ENCOUNTER — HOSPITAL ENCOUNTER (OUTPATIENT)
Dept: RADIATION ONCOLOGY | Facility: HOSPITAL | Age: 56
Discharge: HOME | End: 2021-02-26
Attending: INTERNAL MEDICINE
Payer: COMMERCIAL

## 2021-02-26 VITALS
OXYGEN SATURATION: 98 % | WEIGHT: 155 LBS | SYSTOLIC BLOOD PRESSURE: 107 MMHG | RESPIRATION RATE: 16 BRPM | BODY MASS INDEX: 22 KG/M2 | TEMPERATURE: 99 F | HEART RATE: 92 BPM | DIASTOLIC BLOOD PRESSURE: 69 MMHG

## 2021-02-26 DIAGNOSIS — C50.211 MALIGNANT NEOPLASM OF UPPER-INNER QUADRANT OF RIGHT BREAST IN FEMALE, ESTROGEN RECEPTOR POSITIVE (HCC): ICD-10-CM

## 2021-02-26 DIAGNOSIS — Z17.0 MALIGNANT NEOPLASM OF UPPER-INNER QUADRANT OF RIGHT BREAST IN FEMALE, ESTROGEN RECEPTOR POSITIVE (HCC): ICD-10-CM

## 2021-02-26 PROCEDURE — 99213 OFFICE O/P EST LOW 20 MIN: CPT

## 2021-02-26 NOTE — PROGRESS NOTES
Nursing Follow-Up Note    Patient: Graham Dance  YOB: 1965  Age: 50year old  Radiation Oncologist: Dr. Deb Campos  Referring Physician: Ramandeep Jones  Chief Complaint: Patient presents with:   Follow - Up    Date: 2/26/2021    To

## 2021-02-26 NOTE — PATIENT INSTRUCTIONS
- CALL [phone] FOR A FOLLOW-UP WITH DR. LAI IN 6 MONTHS (END OF AUGUST TO EARLY SEPT. )  - CALL CENTRAL SCHEDULING AT [phone] TO SCHEDULE YOUR NEXT MAMMOGRAM AND MRI OF BREASTS   - CALL IF YOU HAVE ANY QUESTIONS/CONCERNS REGARDING RADIATION

## 2021-03-01 NOTE — PROGRESS NOTES
[facility]  Radiation Oncology Follow-up    Patient Name: Vinny Gant   MRN: [id number]  Referring Physician: Arelis Nelson MD; Anali Dempsey MD     Diagnosis: R breast IDC, grade 3, ER/GA+, HER2-, Ki-67 25%, pT1a (4mm) N0 (0/1) 92   Resp: 16   Temp: 98.6 °F (37 °C)       ECO    Gen: NAD  HEENT: NCAT  Neck: no LAD  CV: RRR  Lungs: CTAB  Ext: wwp, no cyanosis or edema  Neuro: CN II-XII grossly intact    Breast Exam  -Right breast: normal contours, mild residual hyperpigmentatio

## 2021-03-22 ENCOUNTER — TELEPHONE (OUTPATIENT)
Dept: FAMILY MEDICINE CLINIC | Facility: CLINIC | Age: 56
End: 2021-03-22

## 2021-03-22 NOTE — TELEPHONE ENCOUNTER
Pt states that she received the 1st Covid-19 vaccine 2/27/2021  Sat 3/20 ws due for her 2nd dose, but tested positive for Covid 3/20/21  She states her symptoms are a slight cough and mild sore throat   Sinus congestion and headache  Her  and son al

## 2021-03-23 ENCOUNTER — TELEMEDICINE (OUTPATIENT)
Dept: FAMILY MEDICINE CLINIC | Facility: CLINIC | Age: 56
End: 2021-03-23

## 2021-03-23 VITALS — HEART RATE: 73 BPM | OXYGEN SATURATION: 95 %

## 2021-03-23 DIAGNOSIS — U07.1 COVID-19: Primary | ICD-10-CM

## 2021-03-23 DIAGNOSIS — J45.20 MILD INTERMITTENT ASTHMA WITHOUT COMPLICATION: ICD-10-CM

## 2021-03-23 PROCEDURE — 99214 OFFICE O/P EST MOD 30 MIN: CPT | Performed by: FAMILY MEDICINE

## 2021-03-23 RX ORDER — ALBUTEROL SULFATE 90 UG/1
2 AEROSOL, METERED RESPIRATORY (INHALATION) EVERY 4 HOURS PRN
Qty: 18 G | Refills: 1 | Status: SHIPPED | OUTPATIENT
Start: 2021-03-23 | End: 2021-04-15

## 2021-03-23 NOTE — PROGRESS NOTES
Due to COVID-19 ACTION PLAN, the patient's office visit was converted to a video visit. Time Spent: 26 mins + 5 mins writing note.     Patient presents with:  Covid: 3/20/21 Tested + for Covid - slight cough, sinus congestion, fatigued, headache    Subject mouth daily. , Disp: , Rfl:   Probiotic Product (PROBIOTIC DAILY OR), Take by mouth., Disp: , Rfl:   Na Sulfate-K Sulfate-Mg Sulf (SUPREP BOWEL PREP KIT) 17.5-3.13-1.6 GM/177ML Oral Solution, Take as directed by physician.  (Patient not taking: Reported on 2 immediately or go to nearest ER/911. Covid 19 instructions with CDC guidelines sent to Sticky if activated. Multiple questions answered concerning treatment plan, management, warning symptoms, Pfizer vaccine #2.     Maintain social distancing and isola provide continuity of care in the best interest of the provider-patient relationship, due to the on-going public health crisis/national emergency and because of restrictions of visitation.   There are limitations of this visit as no physical exam could be p

## 2021-03-24 RX ORDER — BUDESONIDE 180 UG/1
AEROSOL, POWDER RESPIRATORY (INHALATION)
Qty: 3 EACH | Refills: 0 | Status: SHIPPED | OUTPATIENT
Start: 2021-03-24 | End: 2021-04-15

## 2021-03-24 RX ORDER — ALBUTEROL SULFATE 90 UG/1
AEROSOL, METERED RESPIRATORY (INHALATION)
Qty: 25.5 G | Refills: 3 | OUTPATIENT
Start: 2021-03-24

## 2021-03-24 NOTE — TELEPHONE ENCOUNTER
Pt requesting refill of pulmicort    Passed protocol, refill approved, sent to pharmacy:     Last Time Medication was Filled:  3/13/2020    Last Office Visit with Provider: 3/23/2021    Patient due to physical soon  No future appointments.       Albuterol

## 2021-04-15 ENCOUNTER — OFFICE VISIT (OUTPATIENT)
Dept: FAMILY MEDICINE CLINIC | Facility: CLINIC | Age: 56
End: 2021-04-15
Payer: COMMERCIAL

## 2021-04-15 ENCOUNTER — LAB ENCOUNTER (OUTPATIENT)
Dept: LAB | Age: 56
End: 2021-04-15
Attending: FAMILY MEDICINE
Payer: COMMERCIAL

## 2021-04-15 VITALS
DIASTOLIC BLOOD PRESSURE: 70 MMHG | HEIGHT: 69 IN | BODY MASS INDEX: 23.55 KG/M2 | WEIGHT: 159 LBS | OXYGEN SATURATION: 98 % | SYSTOLIC BLOOD PRESSURE: 116 MMHG | HEART RATE: 61 BPM | RESPIRATION RATE: 16 BRPM

## 2021-04-15 DIAGNOSIS — Z00.00 ANNUAL PHYSICAL EXAM: Primary | ICD-10-CM

## 2021-04-15 DIAGNOSIS — M54.2 NECK PAIN: ICD-10-CM

## 2021-04-15 DIAGNOSIS — Z13.820 SCREENING FOR OSTEOPOROSIS: ICD-10-CM

## 2021-04-15 DIAGNOSIS — J45.20 MILD INTERMITTENT ASTHMA WITHOUT COMPLICATION: ICD-10-CM

## 2021-04-15 DIAGNOSIS — Z12.11 SCREEN FOR COLON CANCER: ICD-10-CM

## 2021-04-15 PROCEDURE — 83036 HEMOGLOBIN GLYCOSYLATED A1C: CPT | Performed by: FAMILY MEDICINE

## 2021-04-15 PROCEDURE — 86003 ALLG SPEC IGE CRUDE XTRC EA: CPT | Performed by: FAMILY MEDICINE

## 2021-04-15 PROCEDURE — 82306 VITAMIN D 25 HYDROXY: CPT | Performed by: FAMILY MEDICINE

## 2021-04-15 PROCEDURE — 85025 COMPLETE CBC W/AUTO DIFF WBC: CPT | Performed by: FAMILY MEDICINE

## 2021-04-15 PROCEDURE — 80053 COMPREHEN METABOLIC PANEL: CPT | Performed by: FAMILY MEDICINE

## 2021-04-15 PROCEDURE — 82607 VITAMIN B-12: CPT | Performed by: FAMILY MEDICINE

## 2021-04-15 PROCEDURE — 84439 ASSAY OF FREE THYROXINE: CPT | Performed by: FAMILY MEDICINE

## 2021-04-15 PROCEDURE — 36415 COLL VENOUS BLD VENIPUNCTURE: CPT | Performed by: FAMILY MEDICINE

## 2021-04-15 PROCEDURE — 99396 PREV VISIT EST AGE 40-64: CPT | Performed by: FAMILY MEDICINE

## 2021-04-15 PROCEDURE — 82785 ASSAY OF IGE: CPT | Performed by: FAMILY MEDICINE

## 2021-04-15 PROCEDURE — 80061 LIPID PANEL: CPT | Performed by: FAMILY MEDICINE

## 2021-04-15 PROCEDURE — 3008F BODY MASS INDEX DOCD: CPT | Performed by: FAMILY MEDICINE

## 2021-04-15 PROCEDURE — 84443 ASSAY THYROID STIM HORMONE: CPT | Performed by: FAMILY MEDICINE

## 2021-04-15 PROCEDURE — 3074F SYST BP LT 130 MM HG: CPT | Performed by: FAMILY MEDICINE

## 2021-04-15 PROCEDURE — 3078F DIAST BP <80 MM HG: CPT | Performed by: FAMILY MEDICINE

## 2021-04-15 RX ORDER — ALBUTEROL SULFATE 90 UG/1
2 AEROSOL, METERED RESPIRATORY (INHALATION) EVERY 4 HOURS PRN
Qty: 18 G | Refills: 1 | Status: SHIPPED | OUTPATIENT
Start: 2021-04-15

## 2021-04-15 RX ORDER — BUDESONIDE 180 UG/1
2 AEROSOL, POWDER RESPIRATORY (INHALATION) 2 TIMES DAILY
Qty: 3 EACH | Refills: 1 | Status: SHIPPED | OUTPATIENT
Start: 2021-04-15 | End: 2021-11-19 | Stop reason: ALTCHOICE

## 2021-04-15 NOTE — PROGRESS NOTES
HPI:   Kortney Cornejo is a 50year old female who presents for a complete physical exam.     Wt Readings from Last 6 Encounters:  04/15/21 : 159 lb (72.1 kg)  02/26/21 : 155 lb (70.3 kg)  01/15/21 : 149 lb 12.8 oz (67.9 kg)  01/08/21 : 155 lb 9.6 oz ( Oral Tab Take 180 mg by mouth daily. • Probiotic Product (PROBIOTIC DAILY OR) Take by mouth.         Past Medical History:   Diagnosis Date   • Allergic rhinitis due to pollen 9/28/2011    Skin tests 2013- allergic to mold, ragweed, grass, cats, pollen Cancer) Paternal Aunt 62   • Other (Multiple Myeloma) Maternal Aunt         unknown age at dx   • Breast Cancer Self 50        IDC      Social History:   Social History    Tobacco Use      Smoking status: Never Smoker      Smokeless tobacco: Never Used Maxwell Grant is a 50year old female who presents with     1.  Annual physical exam    - FREE T4 (FREE THYROXINE)  - ASSAY, THYROID STIM HORMONE  - LIPID PANEL  - CBC WITH DIFFERENTIAL WITH PLATELET  - VITAMIN E76  - VITAMIN D, 25-HYDROXY  - COMP M

## 2021-04-19 ENCOUNTER — TELEMEDICINE (OUTPATIENT)
Dept: FAMILY MEDICINE CLINIC | Facility: CLINIC | Age: 56
End: 2021-04-19
Payer: COMMERCIAL

## 2021-04-19 DIAGNOSIS — R23.2 HOT FLASHES: ICD-10-CM

## 2021-04-19 DIAGNOSIS — E53.8 B12 DEFICIENCY: ICD-10-CM

## 2021-04-19 DIAGNOSIS — R73.9 HYPERGLYCEMIA: Primary | ICD-10-CM

## 2021-04-19 DIAGNOSIS — E61.1 IRON DEFICIENCY: ICD-10-CM

## 2021-04-19 PROCEDURE — 99214 OFFICE O/P EST MOD 30 MIN: CPT | Performed by: FAMILY MEDICINE

## 2021-04-19 RX ORDER — VENLAFAXINE HYDROCHLORIDE 37.5 MG/1
37.5 CAPSULE, EXTENDED RELEASE ORAL DAILY
Qty: 90 CAPSULE | Refills: 0 | Status: SHIPPED | OUTPATIENT
Start: 2021-04-19 | End: 2021-07-16

## 2021-04-20 ENCOUNTER — TELEPHONE (OUTPATIENT)
Dept: FAMILY MEDICINE CLINIC | Facility: CLINIC | Age: 56
End: 2021-04-20

## 2021-04-20 DIAGNOSIS — L98.9 SKIN ABNORMALITY: Primary | ICD-10-CM

## 2021-04-20 NOTE — TELEPHONE ENCOUNTER
Cherie Sandhu  to Lexi Steward, DO      4/20/21 9:40 AM  I completely forgot to ask Dr. Holly Calderón about an issue that I've been having on part of my upper lip.  It was constantly feeling chapped so I would continually use chapstick, then tried [employer]

## 2021-04-20 NOTE — TELEPHONE ENCOUNTER
Had a video appt with GREGG yesterday. She wants to talk about one of the medications she gave to her. She wants to discuss a swollen lip. Also sent message in [address] Please advise.

## 2021-04-21 RX ORDER — MONTELUKAST SODIUM 10 MG/1
10 TABLET ORAL NIGHTLY
Qty: 90 TABLET | Refills: 0 | Status: SHIPPED | OUTPATIENT
Start: 2021-04-21 | End: 2021-07-16

## 2021-04-21 NOTE — TELEPHONE ENCOUNTER
Pt informed and verbalized understanding. Referral placed to Dr Jane Oden and Singulair to [facility] per pt request.

## 2021-05-09 ENCOUNTER — LAB ENCOUNTER (OUTPATIENT)
Dept: LAB | Facility: HOSPITAL | Age: 56
End: 2021-05-09
Attending: INTERNAL MEDICINE
Payer: COMMERCIAL

## 2021-05-09 DIAGNOSIS — Z01.818 PRE-OP TESTING: ICD-10-CM

## 2021-05-12 PROBLEM — Z86.0101 HISTORY OF ADENOMATOUS POLYP OF COLON: Status: ACTIVE | Noted: 2021-05-12

## 2021-05-12 PROBLEM — Z80.0 FAMILY HISTORY OF COLON CANCER: Status: ACTIVE | Noted: 2021-05-12

## 2021-05-12 PROBLEM — Z86.010 HISTORY OF ADENOMATOUS POLYP OF COLON: Status: ACTIVE | Noted: 2021-05-12

## 2021-05-12 PROBLEM — D12.3 BENIGN NEOPLASM OF TRANSVERSE COLON: Status: ACTIVE | Noted: 2021-05-12

## 2021-05-12 PROBLEM — D12.0 BENIGN NEOPLASM OF CECUM: Status: ACTIVE | Noted: 2021-05-12

## 2021-05-14 ENCOUNTER — OFFICE VISIT (OUTPATIENT)
Dept: HEMATOLOGY/ONCOLOGY | Facility: HOSPITAL | Age: 56
End: 2021-05-14
Attending: INTERNAL MEDICINE
Payer: COMMERCIAL

## 2021-05-14 VITALS
BODY MASS INDEX: 22.7 KG/M2 | OXYGEN SATURATION: 99 % | SYSTOLIC BLOOD PRESSURE: 113 MMHG | TEMPERATURE: 98 F | WEIGHT: 156.81 LBS | HEART RATE: 66 BPM | DIASTOLIC BLOOD PRESSURE: 78 MMHG | RESPIRATION RATE: 16 BRPM | HEIGHT: 69.53 IN

## 2021-05-14 DIAGNOSIS — Z85.3 PERSONAL HISTORY OF BREAST CANCER: Primary | ICD-10-CM

## 2021-05-14 PROCEDURE — 99213 OFFICE O/P EST LOW 20 MIN: CPT | Performed by: INTERNAL MEDICINE

## 2021-05-14 NOTE — PROGRESS NOTES
Patient is here for MD f/u for Breast cancer. Patient is on Tamoxifen daily. Started on Effexor a few weeks ago for hot flashes. Hot flashes have improved. Last mammogram was in February. Patient is feeling well. Patient had a colonoscopy on 5/12.        Ramon Mcgrath

## 2021-06-08 NOTE — PROGRESS NOTES
[facility]    PATIENT'S NAME: Jonelle Solitario   ATTENDING PHYSICIAN: Ismael Soto M.D.    PATIENT ACCOUNT #: [de-identified] LOCATION: [address] [place] [address] RECORD #: [id number] YOB: 1965   DATE OF SERVICE: 05/14/2021       CANCER C Blood pressure is 113/78, pulse 66, respiratory rate is 20, temperature is 97.8 degrees. HEENT:  Unremarkable. She has pink conjunctivae, anicteric sclerae. Pharynx without lesions.   LYMPHATICS:  She has no cervical, supraclavicular, or axillary adenopa

## 2021-06-14 ENCOUNTER — HOSPITAL ENCOUNTER (OUTPATIENT)
Dept: BONE DENSITY | Age: 56
Discharge: HOME | End: 2021-06-14
Attending: FAMILY MEDICINE
Payer: COMMERCIAL

## 2021-06-14 DIAGNOSIS — Z13.820 SCREENING FOR OSTEOPOROSIS: ICD-10-CM

## 2021-06-14 PROCEDURE — 77080 DXA BONE DENSITY AXIAL: CPT | Performed by: FAMILY MEDICINE

## 2021-07-14 ENCOUNTER — PATIENT MESSAGE (OUTPATIENT)
Dept: FAMILY MEDICINE CLINIC | Facility: CLINIC | Age: 56
End: 2021-07-14

## 2021-07-14 DIAGNOSIS — R23.2 HOT FLASHES: ICD-10-CM

## 2021-07-15 NOTE — TELEPHONE ENCOUNTER
From: Brionna Kothari  To: Anel Sloan DO  Sent: 7/14/2021 6:58 PM CDT  Subject: Prescription Question    Hi Dr. Franky Mercedes,    I'm almost out of my two prescriptions snd was wondering if the office could send them to our mail order pharmacy [facility]

## 2021-07-16 RX ORDER — MONTELUKAST SODIUM 10 MG/1
10 TABLET ORAL NIGHTLY
Qty: 90 TABLET | Refills: 0 | Status: SHIPPED | OUTPATIENT
Start: 2021-07-16 | End: 2021-09-21

## 2021-07-16 RX ORDER — VENLAFAXINE HYDROCHLORIDE 37.5 MG/1
37.5 CAPSULE, EXTENDED RELEASE ORAL DAILY
Qty: 90 CAPSULE | Refills: 0 | Status: SHIPPED | OUTPATIENT
Start: 2021-07-16 | End: 2021-10-13

## 2021-08-02 ENCOUNTER — HOSPITAL ENCOUNTER (OUTPATIENT)
Dept: MAMMOGRAPHY | Facility: HOSPITAL | Age: 56
Discharge: HOME | End: 2021-08-02
Attending: SURGERY
Payer: COMMERCIAL

## 2021-08-02 DIAGNOSIS — C50.211 MALIGNANT NEOPLASM OF UPPER-INNER QUADRANT OF RIGHT BREAST IN FEMALE, ESTROGEN RECEPTOR POSITIVE (HCC): ICD-10-CM

## 2021-08-02 DIAGNOSIS — Z17.0 MALIGNANT NEOPLASM OF UPPER-INNER QUADRANT OF RIGHT BREAST IN FEMALE, ESTROGEN RECEPTOR POSITIVE (HCC): ICD-10-CM

## 2021-08-02 PROCEDURE — 76642 ULTRASOUND BREAST LIMITED: CPT | Performed by: SURGERY

## 2021-08-02 PROCEDURE — 77062 BREAST TOMOSYNTHESIS BI: CPT | Performed by: SURGERY

## 2021-08-02 PROCEDURE — 77066 DX MAMMO INCL CAD BI: CPT | Performed by: SURGERY

## 2021-08-05 RX ORDER — TAMOXIFEN CITRATE 20 MG/1
TABLET ORAL
Qty: 90 TABLET | Refills: 3 | Status: SHIPPED | OUTPATIENT
Start: 2021-08-05 | End: 2022-08-01

## 2021-09-21 RX ORDER — MONTELUKAST SODIUM 10 MG/1
TABLET ORAL
Qty: 90 TABLET | Refills: 3 | Status: SHIPPED | OUTPATIENT
Start: 2021-09-21

## 2021-10-11 DIAGNOSIS — R23.2 HOT FLASHES: ICD-10-CM

## 2021-10-13 RX ORDER — VENLAFAXINE HYDROCHLORIDE 37.5 MG/1
37.5 CAPSULE, EXTENDED RELEASE ORAL DAILY
Qty: 90 CAPSULE | Refills: 0 | Status: SHIPPED | OUTPATIENT
Start: 2021-10-13 | End: 2021-11-19

## 2021-10-13 NOTE — TELEPHONE ENCOUNTER
Dr Alo Cleveland, pt wants to know if she needs labs prior to [facility]. She has open labs from 4/2021. Do you want anything else?

## 2021-10-26 ENCOUNTER — HOSPITAL ENCOUNTER (OUTPATIENT)
Dept: MRI IMAGING | Facility: HOSPITAL | Age: 56
Discharge: HOME | End: 2021-10-26
Attending: SURGERY
Payer: COMMERCIAL

## 2021-10-26 DIAGNOSIS — Z17.0 MALIGNANT NEOPLASM OF UPPER-INNER QUADRANT OF RIGHT BREAST IN FEMALE, ESTROGEN RECEPTOR POSITIVE (HCC): ICD-10-CM

## 2021-10-26 DIAGNOSIS — R92.2 DENSE BREAST: ICD-10-CM

## 2021-10-26 DIAGNOSIS — C50.211 MALIGNANT NEOPLASM OF UPPER-INNER QUADRANT OF RIGHT BREAST IN FEMALE, ESTROGEN RECEPTOR POSITIVE (HCC): ICD-10-CM

## 2021-10-26 PROCEDURE — 77049 MRI BREAST C-+ W/CAD BI: CPT | Performed by: SURGERY

## 2021-10-26 PROCEDURE — A9575 INJ GADOTERATE MEGLUMI 0.1ML: HCPCS | Performed by: SURGERY

## 2021-11-05 ENCOUNTER — APPOINTMENT (OUTPATIENT)
Dept: HEMATOLOGY/ONCOLOGY | Facility: HOSPITAL | Age: 56
End: 2021-11-05
Attending: INTERNAL MEDICINE
Payer: COMMERCIAL

## 2021-11-09 ENCOUNTER — TELEPHONE (OUTPATIENT)
Dept: FAMILY MEDICINE CLINIC | Facility: CLINIC | Age: 56
End: 2021-11-09

## 2021-11-09 ENCOUNTER — OFFICE VISIT (OUTPATIENT)
Dept: HEMATOLOGY/ONCOLOGY | Facility: HOSPITAL | Age: 56
End: 2021-11-09
Attending: INTERNAL MEDICINE
Payer: COMMERCIAL

## 2021-11-09 VITALS
SYSTOLIC BLOOD PRESSURE: 127 MMHG | HEIGHT: 69.53 IN | WEIGHT: 164.19 LBS | HEART RATE: 64 BPM | TEMPERATURE: 98 F | OXYGEN SATURATION: 99 % | DIASTOLIC BLOOD PRESSURE: 75 MMHG | RESPIRATION RATE: 16 BRPM | BODY MASS INDEX: 23.77 KG/M2

## 2021-11-09 DIAGNOSIS — Z17.0 MALIGNANT NEOPLASM OF UPPER-INNER QUADRANT OF RIGHT BREAST IN FEMALE, ESTROGEN RECEPTOR POSITIVE (HCC): ICD-10-CM

## 2021-11-09 DIAGNOSIS — Z85.3 HISTORY OF BREAST CANCER: Primary | ICD-10-CM

## 2021-11-09 DIAGNOSIS — C50.211 MALIGNANT NEOPLASM OF UPPER-INNER QUADRANT OF RIGHT BREAST IN FEMALE, ESTROGEN RECEPTOR POSITIVE (HCC): ICD-10-CM

## 2021-11-09 PROCEDURE — 99214 OFFICE O/P EST MOD 30 MIN: CPT | Performed by: INTERNAL MEDICINE

## 2021-11-09 RX ORDER — VALACYCLOVIR HYDROCHLORIDE 1 G/1
1000 TABLET, FILM COATED ORAL 3 TIMES DAILY
Qty: 21 TABLET | Refills: 0 | Status: SHIPPED | OUTPATIENT
Start: 2021-11-09 | End: 2021-11-16

## 2021-11-09 NOTE — TELEPHONE ENCOUNTER
Pt states she saw Dr Leigh Ann Gleason today for follow up and so while there asked him about shingles. He said she does appear to have it and Rx'd valacyclovir. Pt would like to know if she should get Shingrix.  Dr Leigh Ann Gleason advised to ask us and thought if yes, m

## 2021-11-09 NOTE — PROGRESS NOTES
Patient is here for 6 month follow up for Breast cancer. Patient continues on Tamoxifen. She had an MRI of the breast on 10/26 and a mammogram on 8/2. Last DEXA was on 6/14.        Education Record    Learner:  Patient    Disease / Diagnosis: Breast cancer

## 2021-11-10 ENCOUNTER — PATIENT MESSAGE (OUTPATIENT)
Dept: FAMILY MEDICINE CLINIC | Facility: CLINIC | Age: 56
End: 2021-11-10

## 2021-11-10 NOTE — TELEPHONE ENCOUNTER
From: Pete Montalvo  To: Rosalva Grijalva DO  Sent: 11/10/2021 2:25 PM CST  Subject: Covid Booster    My Covid vaccination card is attached so that the booster shot can be added to my record. Thank you.

## 2021-11-15 PROBLEM — B02.9 HERPES ZOSTER WITHOUT COMPLICATION: Status: ACTIVE | Noted: 2021-11-15

## 2021-11-16 NOTE — PROGRESS NOTES
[facility]    PATIENT'S NAME: Garth Ralph   ATTENDING PHYSICIAN: Mellisa Foote M.D.    PATIENT ACCOUNT #: [de-identified] LOCATION: [address] [place] [address] RECORD #: [id number] YOB: 1965   DATE OF SERVICE: 11/09/2021       CANCER She has no cervical, supraclavicular, or axillary adenopathy. LUNGS:  Resonant to percussion and clear to auscultation. No wheezing, rales, or rhonchi. HEART:  Normal.  ABDOMEN:  No hepatosplenomegaly or tenderness.   EXTREMITIES:  She has no clubbing,

## 2021-11-19 ENCOUNTER — OFFICE VISIT (OUTPATIENT)
Dept: FAMILY MEDICINE CLINIC | Facility: CLINIC | Age: 56
End: 2021-11-19
Payer: COMMERCIAL

## 2021-11-19 VITALS
HEART RATE: 72 BPM | BODY MASS INDEX: 24.47 KG/M2 | SYSTOLIC BLOOD PRESSURE: 113 MMHG | HEIGHT: 69.5 IN | OXYGEN SATURATION: 98 % | WEIGHT: 169 LBS | RESPIRATION RATE: 16 BRPM | DIASTOLIC BLOOD PRESSURE: 62 MMHG

## 2021-11-19 DIAGNOSIS — R23.2 HOT FLASHES: ICD-10-CM

## 2021-11-19 DIAGNOSIS — J01.10 SUBACUTE FRONTAL SINUSITIS: Primary | ICD-10-CM

## 2021-11-19 PROCEDURE — 3078F DIAST BP <80 MM HG: CPT | Performed by: FAMILY MEDICINE

## 2021-11-19 PROCEDURE — 99214 OFFICE O/P EST MOD 30 MIN: CPT | Performed by: FAMILY MEDICINE

## 2021-11-19 PROCEDURE — 3008F BODY MASS INDEX DOCD: CPT | Performed by: FAMILY MEDICINE

## 2021-11-19 PROCEDURE — 3074F SYST BP LT 130 MM HG: CPT | Performed by: FAMILY MEDICINE

## 2021-11-19 RX ORDER — AMOXICILLIN AND CLAVULANATE POTASSIUM 875; 125 MG/1; MG/1
1 TABLET, FILM COATED ORAL 2 TIMES DAILY
Qty: 20 TABLET | Refills: 0 | Status: SHIPPED | OUTPATIENT
Start: 2021-11-19 | End: 2021-11-29

## 2021-11-19 RX ORDER — FLUTICASONE PROPIONATE 50 MCG
2 SPRAY, SUSPENSION (ML) NASAL NIGHTLY
Qty: 1 EACH | Refills: 0 | Status: SHIPPED | OUTPATIENT
Start: 2021-11-19 | End: 2021-12-13

## 2021-11-19 RX ORDER — VENLAFAXINE HYDROCHLORIDE 37.5 MG/1
37.5 CAPSULE, EXTENDED RELEASE ORAL DAILY
Qty: 90 CAPSULE | Refills: 0 | Status: SHIPPED | OUTPATIENT
Start: 2021-11-19

## 2021-11-19 RX ORDER — BIOTIN 10 MG
TABLET ORAL
COMMUNITY
Start: 2021-10-29

## 2021-11-19 RX ORDER — PREDNISONE 20 MG/1
40 TABLET ORAL DAILY
Qty: 8 TABLET | Refills: 0 | Status: SHIPPED | OUTPATIENT
Start: 2021-11-19 | End: 2021-11-23

## 2021-11-19 NOTE — PROGRESS NOTES
Pt here with cold symptoms and hot flash follow up   Started one week ago   Not getting better  No fever  covid neg   OTC meds no   +  cough and congestion  +  sinus tendernss  No  ear pain  No  throat pain  No  fever and chills  +  sick contacts      Hot

## 2021-12-03 ENCOUNTER — TELEPHONE (OUTPATIENT)
Dept: FAMILY MEDICINE CLINIC | Facility: CLINIC | Age: 56
End: 2021-12-03

## 2021-12-03 RX ORDER — CEFDINIR 300 MG/1
300 CAPSULE ORAL 2 TIMES DAILY
Qty: 20 CAPSULE | Refills: 0 | Status: SHIPPED | OUTPATIENT
Start: 2021-12-03 | End: 2021-12-13

## 2021-12-03 NOTE — TELEPHONE ENCOUNTER
Patient has had sinus infection for 3 weeks. Had appt on 11/19 and prescribed antibiotics. Patient is still having the same lingering symptoms. Requesting if another script of antibiotics can be ordered. Please advise.

## 2021-12-03 NOTE — TELEPHONE ENCOUNTER
Pt had OV on 11/19/21 and put on Amoxicillin Clavulanate for sinus infection. Pt states still with Sx and wondering if more ABX can be ordered. Please advise.

## 2021-12-13 RX ORDER — FLUTICASONE PROPIONATE 50 MCG
SPRAY, SUSPENSION (ML) NASAL
Qty: 16 ML | Refills: 0 | Status: SHIPPED | OUTPATIENT
Start: 2021-12-13 | End: 2022-01-11

## 2022-01-11 RX ORDER — FLUTICASONE PROPIONATE 50 MCG
SPRAY, SUSPENSION (ML) NASAL
Qty: 16 ML | Refills: 0 | Status: SHIPPED | OUTPATIENT
Start: 2022-01-11 | End: 2022-01-11

## 2022-01-19 ENCOUNTER — TELEMEDICINE (OUTPATIENT)
Dept: FAMILY MEDICINE CLINIC | Facility: CLINIC | Age: 57
End: 2022-01-19
Payer: COMMERCIAL

## 2022-01-19 DIAGNOSIS — J01.00 ACUTE NON-RECURRENT MAXILLARY SINUSITIS: Primary | ICD-10-CM

## 2022-01-19 PROCEDURE — 99213 OFFICE O/P EST LOW 20 MIN: CPT | Performed by: FAMILY MEDICINE

## 2022-01-19 RX ORDER — AMOXICILLIN AND CLAVULANATE POTASSIUM 875; 125 MG/1; MG/1
1 TABLET, FILM COATED ORAL 2 TIMES DAILY
Qty: 20 TABLET | Refills: 0 | Status: SHIPPED | OUTPATIENT
Start: 2022-01-19 | End: 2022-01-29

## 2022-01-19 RX ORDER — AZELASTINE HCL 205.5 UG/1
1 SPRAY NASAL 2 TIMES DAILY
Qty: 1 EACH | Refills: 1 | Status: SHIPPED | OUTPATIENT
Start: 2022-01-19

## 2022-01-19 NOTE — PROGRESS NOTES
Subjective:   Patient ID: Nicole Rowland is a 49year old female. Little over 1 week ago started with sinus congestion and pain. No F/C/Sweats/cough/sob/CP/fatigue/bodyaches. Has had 3 covid shots.    was sick last week tested negative x 2

## 2022-02-07 RX ORDER — FLUTICASONE PROPIONATE 50 MCG
SPRAY, SUSPENSION (ML) NASAL
Qty: 16 ML | Refills: 0 | Status: SHIPPED | OUTPATIENT
Start: 2022-02-07 | End: 2022-03-10

## 2022-02-15 ENCOUNTER — TELEPHONE (OUTPATIENT)
Dept: FAMILY MEDICINE CLINIC | Facility: CLINIC | Age: 57
End: 2022-02-15

## 2022-02-15 NOTE — TELEPHONE ENCOUNTER
Pt has televisit tomorrow. Pt wants to switch to something else, however Does she need to wean off Venlafaxine 37.5mg 2 qpm?    If so, can she get refill so she can get her RX today since she will be leaving out of town tomorrow.

## 2022-02-15 NOTE — TELEPHONE ENCOUNTER
She has video appt tomorrow with GREGG. Today is her last day of medication that GREGG put her on for her hot flashes. It isn't working perfect but she figures she will need to be on a small dose to taper off. She is going out of town tomorrow night and she is worried about getting her medication. Please call her today.

## 2022-02-16 ENCOUNTER — TELEMEDICINE (OUTPATIENT)
Dept: FAMILY MEDICINE CLINIC | Facility: CLINIC | Age: 57
End: 2022-02-16
Payer: COMMERCIAL

## 2022-02-16 DIAGNOSIS — R23.2 HOT FLASHES: ICD-10-CM

## 2022-02-16 PROCEDURE — 99213 OFFICE O/P EST LOW 20 MIN: CPT | Performed by: FAMILY MEDICINE

## 2022-02-16 PROCEDURE — 99998 NO SHOW: CPT | Performed by: FAMILY MEDICINE

## 2022-02-16 RX ORDER — GABAPENTIN 100 MG/1
100 CAPSULE ORAL 3 TIMES DAILY
Qty: 90 CAPSULE | Refills: 0 | Status: SHIPPED | OUTPATIENT
Start: 2022-02-16 | End: 2022-03-21 | Stop reason: ALTCHOICE

## 2022-02-16 RX ORDER — VENLAFAXINE HYDROCHLORIDE 37.5 MG/1
37.5 CAPSULE, EXTENDED RELEASE ORAL DAILY
Qty: 60 CAPSULE | Refills: 0 | Status: SHIPPED | OUTPATIENT
Start: 2022-02-16 | End: 2022-03-21 | Stop reason: ALTCHOICE

## 2022-03-10 RX ORDER — FLUTICASONE PROPIONATE 50 MCG
SPRAY, SUSPENSION (ML) NASAL
Qty: 16 ML | Refills: 0 | Status: SHIPPED | OUTPATIENT
Start: 2022-03-10 | End: 2022-03-24

## 2022-03-11 ENCOUNTER — HOSPITAL ENCOUNTER (OUTPATIENT)
Dept: MAMMOGRAPHY | Facility: HOSPITAL | Age: 57
Discharge: HOME | End: 2022-03-11
Attending: INTERNAL MEDICINE
Payer: COMMERCIAL

## 2022-03-11 DIAGNOSIS — Z85.3 HISTORY OF BREAST CANCER: ICD-10-CM

## 2022-03-11 PROCEDURE — 76641 ULTRASOUND BREAST COMPLETE: CPT | Performed by: INTERNAL MEDICINE

## 2022-03-11 PROCEDURE — 77066 DX MAMMO INCL CAD BI: CPT | Performed by: INTERNAL MEDICINE

## 2022-03-11 PROCEDURE — 77062 BREAST TOMOSYNTHESIS BI: CPT | Performed by: INTERNAL MEDICINE

## 2022-03-21 ENCOUNTER — OFFICE VISIT (OUTPATIENT)
Dept: SURGERY | Facility: CLINIC | Age: 57
End: 2022-03-21
Payer: COMMERCIAL

## 2022-03-21 VITALS
OXYGEN SATURATION: 97 % | HEIGHT: 69.5 IN | WEIGHT: 172.38 LBS | HEART RATE: 75 BPM | TEMPERATURE: 98 F | SYSTOLIC BLOOD PRESSURE: 108 MMHG | DIASTOLIC BLOOD PRESSURE: 75 MMHG | RESPIRATION RATE: 18 BRPM | BODY MASS INDEX: 24.96 KG/M2

## 2022-03-21 DIAGNOSIS — M94.0 COSTOCHONDRITIS: Primary | ICD-10-CM

## 2022-03-21 DIAGNOSIS — C50.211 MALIGNANT NEOPLASM OF UPPER-INNER QUADRANT OF RIGHT BREAST IN FEMALE, ESTROGEN RECEPTOR POSITIVE (HCC): ICD-10-CM

## 2022-03-21 DIAGNOSIS — Z17.0 MALIGNANT NEOPLASM OF UPPER-INNER QUADRANT OF RIGHT BREAST IN FEMALE, ESTROGEN RECEPTOR POSITIVE (HCC): ICD-10-CM

## 2022-03-21 PROCEDURE — 99213 OFFICE O/P EST LOW 20 MIN: CPT | Performed by: SURGERY

## 2022-03-21 RX ORDER — GABAPENTIN 300 MG/1
300 CAPSULE ORAL 3 TIMES DAILY
COMMUNITY

## 2022-03-24 RX ORDER — FLUTICASONE PROPIONATE 50 MCG
2 SPRAY, SUSPENSION (ML) NASAL NIGHTLY
Qty: 48 ML | Refills: 1 | Status: SHIPPED | OUTPATIENT
Start: 2022-03-24

## 2022-04-06 NOTE — PATIENT INSTRUCTIONS
Surgeon: Dr. Adrian Mcdonald      Tel:  [phone]    Fax: [phone]     Surgery/Procedure:  Bilateral breast reconstruction with tissue expander placement, ADM (possible direct to  silicone implants) general anesthesia, 3 hours, outpatient.  Joint with RED Alert-The patient is alert, awake and responds to voice. The patient is oriented to time, place, and person. The triage nurse is able to obtain subjective information.

## 2022-05-06 ENCOUNTER — OFFICE VISIT (OUTPATIENT)
Dept: HEMATOLOGY/ONCOLOGY | Facility: HOSPITAL | Age: 57
End: 2022-05-06
Attending: INTERNAL MEDICINE
Payer: COMMERCIAL

## 2022-05-06 VITALS
HEART RATE: 82 BPM | BODY MASS INDEX: 24.61 KG/M2 | DIASTOLIC BLOOD PRESSURE: 49 MMHG | RESPIRATION RATE: 16 BRPM | HEIGHT: 69.49 IN | OXYGEN SATURATION: 96 % | WEIGHT: 170 LBS | TEMPERATURE: 97 F | SYSTOLIC BLOOD PRESSURE: 104 MMHG

## 2022-05-06 DIAGNOSIS — Z17.0 MALIGNANT NEOPLASM OF UPPER-INNER QUADRANT OF RIGHT BREAST IN FEMALE, ESTROGEN RECEPTOR POSITIVE (HCC): Primary | ICD-10-CM

## 2022-05-06 DIAGNOSIS — C50.211 MALIGNANT NEOPLASM OF UPPER-INNER QUADRANT OF RIGHT BREAST IN FEMALE, ESTROGEN RECEPTOR POSITIVE (HCC): Primary | ICD-10-CM

## 2022-05-06 PROCEDURE — 99214 OFFICE O/P EST MOD 30 MIN: CPT | Performed by: INTERNAL MEDICINE

## 2022-05-06 RX ORDER — CHLORAL HYDRATE 500 MG
1000 CAPSULE ORAL DAILY
COMMUNITY

## 2022-05-06 RX ORDER — CETIRIZINE HYDROCHLORIDE 10 MG/1
10 TABLET ORAL DAILY
COMMUNITY

## 2022-05-06 NOTE — PROGRESS NOTES
Patient is here for MD f/u for Breast cancer. She is on Tamoxifen and is tolerating well. Some hot flashes on occasion. Denies pain. Appetite and energy level are good. Patient had a mammogram in March. Last DEXA scan was in June 2021.        Education Record    Learner:  Patient    Disease / Diagnosis: Breast cancer     Barriers / Limitations:  None   Comments:    Method:  Discussion   Comments:    General Topics:  Plan of care reviewed   Comments:    Outcome:  Shows understanding   Comments:

## 2022-05-08 NOTE — PROGRESS NOTES
[facility]    PATIENT'S NAME: Lorie Sanz   ATTENDING PHYSICIAN: Shanel Mahoney M.D. PATIENT ACCOUNT #: [de-identified] LOCATION: [address] [place] [address] RECORD #: [id number] YOB: 1965   DATE OF SERVICE: 05/06/2022       CANCER CENTER PROGRESS NOTE    CHIEF COMPLAINT:  Followup for history of breast cancer. HISTORY OF PRESENT ILLNESS:  The patient is a 81-year-old female. She had a E4fK1H2 breast cancer. She was ER/CO positive and HER2 negative. After a long discussion, she chose to have an Oncotype test done, and she had an Oncotype score of 28. As a result, she was treated with 4 cycles of TC, which she completed in November 2020. She has now been on tamoxifen. She became menopausal from her chemotherapy. She has been amenorrheic. She has had no spotting or bleeding. She has had regular breast imaging followup and followup with Dr. Jones Jones. She had a mammogram in March. She had her previous MRI done in October, and we are doing these on an alternating basis due to the density of her breast.  She denies any new complaints at present. She notes she is doing quite well. She is exercising. She denies any bone pain. She has some hot flashes, but she is \"powering through them. \"  She stopped all medication related to this. MEDICATIONS:  Her current medications include only albuterol inhaler 2 puffs q.6 h. p.r.n., azelastine nasal spray p.r.n., B-complex vitamins daily, biotin 10 mg daily, cetirizine 10 mg daily, ergocalciferol 2000 units daily, fluticasone propionate nasal spray 2 sprays nightly, montelukast sodium 10 mg daily, multivitamin daily, omega-3 fatty acids 1000 mg daily, probiotics daily, and tamoxifen 20 mg daily. PHYSICAL EXAMINATION:    GENERAL:  She is a well-appearing female in no acute distress. VITAL SIGNS:  Her performance status 0. Her weight is 170 pounds. She is 5 feet 9 inches.   Blood pressure is 104/49, pulse 82, respiratory rate is 20, temperature is 97.1. HEENT:  Unremarkable. She has pink conjunctivae, anicteric sclerae. Pharynx without lesion. LYMPHATICS:  She has no cervical, supraclavicular, or axillary adenopathy. LUNGS:  Resonant to percussion and clear to auscultation with no wheezing, rales, or rhonchi. HEART:  Normal.  ABDOMEN:  No hepatosplenomegaly or tenderness. EXTREMITIES:  She has no clubbing, cyanosis, or edema. NEUROLOGIC:  She is intact. BREASTS:  Exam was not done since it was just done by Dr. Sveta Bradshaw recently. ASSESSMENT AND PLAN:  History of breast cancer. She is approaching 2 years from her definitive surgery. She has now been on hormonal therapy for a year and a half with tamoxifen. She did become menopausal.  She is considered low risk and as a result, I did not push to start her on an aromatase inhibitor. It is also possible she may still have minor residual ovarian function. For now, we are staying with this. We could discuss continuation strategies or switching strategies in the future. She will be back in 6 months. I will see her at that time. She is up to date with regard to her imaging. Dictated By Laine Cardoso M.D.  d: 05/07/2022 06:08:05  t: 05/07/2022 15:00:22  [facility] [id number]  JU/    cc: ONOFRE Fry.  Sveta Bradshaw M.D.

## 2022-07-06 ENCOUNTER — PATIENT MESSAGE (OUTPATIENT)
Dept: FAMILY MEDICINE CLINIC | Facility: CLINIC | Age: 57
End: 2022-07-06

## 2022-08-01 RX ORDER — TAMOXIFEN CITRATE 20 MG/1
TABLET ORAL
Qty: 90 TABLET | Refills: 3 | Status: SHIPPED | OUTPATIENT
Start: 2022-08-01 | End: 2023-07-26

## 2022-08-24 DIAGNOSIS — J45.20 MILD INTERMITTENT ASTHMA WITHOUT COMPLICATION: ICD-10-CM

## 2022-08-24 RX ORDER — BUDESONIDE 180 UG/1
2 AEROSOL, POWDER RESPIRATORY (INHALATION) 2 TIMES DAILY
Qty: 3 EACH | Refills: 0 | Status: SHIPPED | OUTPATIENT
Start: 2022-08-24 | End: 2022-11-04

## 2022-09-16 RX ORDER — MONTELUKAST SODIUM 10 MG/1
TABLET ORAL
Qty: 90 TABLET | Refills: 0 | Status: SHIPPED | OUTPATIENT
Start: 2022-09-16

## 2022-09-20 ENCOUNTER — HOSPITAL ENCOUNTER (OUTPATIENT)
Dept: MRI IMAGING | Facility: HOSPITAL | Age: 57
Discharge: HOME | End: 2022-09-20
Attending: SURGERY

## 2022-09-20 DIAGNOSIS — C50.211 MALIGNANT NEOPLASM OF UPPER-INNER QUADRANT OF RIGHT BREAST IN FEMALE, ESTROGEN RECEPTOR POSITIVE (HCC): ICD-10-CM

## 2022-09-20 DIAGNOSIS — Z17.0 MALIGNANT NEOPLASM OF UPPER-INNER QUADRANT OF RIGHT BREAST IN FEMALE, ESTROGEN RECEPTOR POSITIVE (HCC): ICD-10-CM

## 2022-09-20 PROCEDURE — 77049 MRI BREAST C-+ W/CAD BI: CPT | Performed by: SURGERY

## 2022-09-20 PROCEDURE — A9575 INJ GADOTERATE MEGLUMI 0.1ML: HCPCS

## 2022-09-30 ENCOUNTER — TELEPHONE (OUTPATIENT)
Dept: HEMATOLOGY/ONCOLOGY | Facility: HOSPITAL | Age: 57
End: 2022-09-30

## 2022-09-30 ENCOUNTER — TELEPHONE (OUTPATIENT)
Dept: FAMILY MEDICINE CLINIC | Facility: CLINIC | Age: 57
End: 2022-09-30

## 2022-09-30 NOTE — TELEPHONE ENCOUNTER
Toxicities: Tamoxifen    Pain in Right Ribs and Bilateral Hips: (Patient reports that over the last two months she has felt an aching pain in both hips when she is sitting and laying in bed. The pain at night will be strong enough to wake her from a deep sleep 2-3 times a night. She will reposition and then go back to sleep. She also reports over the last two days she has been feeling pain in her right rib cage. She said it feels \"bruised\" like it did just after she completed RT. She is currently rating her hip pain \"3-4/10\" when sitting and \"5/10\" when in bed. Her rib pain is \"3-4/10. \" She has not taken anything for pain. She denies falls or injuries. She reports she has been working out and doing strength training, walking since April.)    Patient is due to see Dr Alfredo Rizo on 11/4/2022. She wants to know if what is causing her pain. Should she have xrays or a scan? Should she move her appointment up with Dr Alfredo Rizo? I told her I would forward this message to Dr Alfredo Rizo and his nurse. I did explain Dr Alfredo Rizo is out of the office today and will be back next Monday. Yahaira is requesting a call back.

## 2022-09-30 NOTE — TELEPHONE ENCOUNTER
Patient calling in regards to having for past couple months pain in hip bone that has been feeling worse. States that she is a past cancer patient. Feels worse at night. Wanted to know if [facility] wanted to see her ([facility]'s schd is quite booked so she would need to be squeezed in) or if she should go to her oncologist.    Wanted nurse to advise.

## 2022-09-30 NOTE — TELEPHONE ENCOUNTER
I called Yahaira and updated her that Rome Dutton does not feel this is related to cancer. Patient confirmed she will be seeing her PCP next Wednesday. No further questions or concerns at this time.

## 2022-10-05 ENCOUNTER — HOSPITAL ENCOUNTER (OUTPATIENT)
Dept: GENERAL RADIOLOGY | Age: 57
Discharge: HOME | End: 2022-10-05
Attending: FAMILY MEDICINE
Payer: COMMERCIAL

## 2022-10-05 ENCOUNTER — OFFICE VISIT (OUTPATIENT)
Dept: FAMILY MEDICINE CLINIC | Facility: CLINIC | Age: 57
End: 2022-10-05
Payer: COMMERCIAL

## 2022-10-05 VITALS
HEIGHT: 69.5 IN | OXYGEN SATURATION: 99 % | BODY MASS INDEX: 24.76 KG/M2 | HEART RATE: 71 BPM | DIASTOLIC BLOOD PRESSURE: 60 MMHG | RESPIRATION RATE: 16 BRPM | SYSTOLIC BLOOD PRESSURE: 106 MMHG | WEIGHT: 171 LBS

## 2022-10-05 DIAGNOSIS — M79.10 MYALGIA: ICD-10-CM

## 2022-10-05 DIAGNOSIS — Z92.29 HISTORY OF TAMOXIFEN THERAPY: ICD-10-CM

## 2022-10-05 DIAGNOSIS — M79.672 BILATERAL FOOT PAIN: ICD-10-CM

## 2022-10-05 DIAGNOSIS — Z00.00 GENERAL MEDICAL EXAM: ICD-10-CM

## 2022-10-05 DIAGNOSIS — R10.2 PAIN IN FEMALE PELVIS: ICD-10-CM

## 2022-10-05 DIAGNOSIS — M79.671 BILATERAL FOOT PAIN: ICD-10-CM

## 2022-10-05 DIAGNOSIS — R07.81 RIB PAIN ON RIGHT SIDE: ICD-10-CM

## 2022-10-05 DIAGNOSIS — Z85.3 HISTORY OF BREAST CANCER: ICD-10-CM

## 2022-10-05 DIAGNOSIS — R10.2 PAIN IN FEMALE PELVIS: Primary | ICD-10-CM

## 2022-10-05 PROCEDURE — 3008F BODY MASS INDEX DOCD: CPT | Performed by: FAMILY MEDICINE

## 2022-10-05 PROCEDURE — 73630 X-RAY EXAM OF FOOT: CPT | Performed by: FAMILY MEDICINE

## 2022-10-05 PROCEDURE — 3074F SYST BP LT 130 MM HG: CPT | Performed by: FAMILY MEDICINE

## 2022-10-05 PROCEDURE — 73523 X-RAY EXAM HIPS BI 5/> VIEWS: CPT | Performed by: FAMILY MEDICINE

## 2022-10-05 PROCEDURE — 3078F DIAST BP <80 MM HG: CPT | Performed by: FAMILY MEDICINE

## 2022-10-05 PROCEDURE — 99215 OFFICE O/P EST HI 40 MIN: CPT | Performed by: FAMILY MEDICINE

## 2022-10-09 NOTE — TELEPHONE ENCOUNTER
Please ask radiologist to comment on the ribs/ chest wall on recent MRI due to pts right anterior chest wall pain  Thank you

## 2022-11-03 DIAGNOSIS — J45.20 MILD INTERMITTENT ASTHMA WITHOUT COMPLICATION: ICD-10-CM

## 2022-11-04 ENCOUNTER — OFFICE VISIT (OUTPATIENT)
Dept: HEMATOLOGY/ONCOLOGY | Facility: HOSPITAL | Age: 57
End: 2022-11-04
Attending: INTERNAL MEDICINE
Payer: COMMERCIAL

## 2022-11-04 VITALS
WEIGHT: 168 LBS | RESPIRATION RATE: 16 BRPM | HEIGHT: 69.49 IN | TEMPERATURE: 98 F | BODY MASS INDEX: 24.32 KG/M2 | SYSTOLIC BLOOD PRESSURE: 120 MMHG | OXYGEN SATURATION: 99 % | DIASTOLIC BLOOD PRESSURE: 76 MMHG | HEART RATE: 76 BPM

## 2022-11-04 DIAGNOSIS — C50.211 MALIGNANT NEOPLASM OF UPPER-INNER QUADRANT OF RIGHT BREAST IN FEMALE, ESTROGEN RECEPTOR POSITIVE (HCC): Primary | ICD-10-CM

## 2022-11-04 DIAGNOSIS — Z17.0 MALIGNANT NEOPLASM OF UPPER-INNER QUADRANT OF RIGHT BREAST IN FEMALE, ESTROGEN RECEPTOR POSITIVE (HCC): Primary | ICD-10-CM

## 2022-11-04 PROCEDURE — 99213 OFFICE O/P EST LOW 20 MIN: CPT | Performed by: INTERNAL MEDICINE

## 2022-11-04 RX ORDER — BUDESONIDE 180 UG/1
AEROSOL, POWDER RESPIRATORY (INHALATION)
Qty: 3 EACH | Refills: 3 | OUTPATIENT
Start: 2022-11-04

## 2022-11-04 NOTE — PROGRESS NOTES
Patient is here for MD f/u for Breast cancer. On Tamoxifen daily. She had a MRI breast on 9/20. Next mammogram is scheduled in March along with a follow up with Dr Ellen Zhou. Last DEXA scan was in June 2021. Intermittent right rib, hip and foot pain. Denies any vaginal bleeding.        Education Record    Learner:  Patient and Spouse    Disease / Diagnosis:  Breast cancer     Barriers / Limitations:  None   Comments:    Method:  Discussion   Comments:    General Topics:  Plan of care reviewed   Comments:    Outcome:  Shows understanding   Comments:

## 2022-11-07 ENCOUNTER — TELEPHONE (OUTPATIENT)
Dept: HEMATOLOGY/ONCOLOGY | Facility: HOSPITAL | Age: 57
End: 2022-11-07

## 2022-11-07 NOTE — TELEPHONE ENCOUNTER
Yahaira would like for Miky De Oliveira to give her a call back when possible.  Thank you Avila denton

## 2022-11-08 NOTE — PROGRESS NOTES
[facility]    PATIENT'S NAME: Jenni Hahn   ATTENDING PHYSICIAN: Laine Cardoso M.D. PATIENT ACCOUNT #: [de-identified] LOCATION: [address] [place] [address] RECORD #: [id number] YOB: 1965   DATE OF SERVICE: 11/04/2022       CANCER CENTER PROGRESS NOTE    CHIEF COMPLAINT:  Followup for history of breast cancer. HISTORY OF PRESENT ILLNESS:  The patient is a 24-year-old female. I last saw her six months ago. She was diagnosed in 2020 with breast cancer. She had a [id number] breast cancer. She was ER/MA positive and HER2 negative. After a long discussion, she chose to have an Oncotype test done, and she had an Oncotype score of 28. As a result, she was treated with 4 cycles of TC, which she completed 2 years ago in November 2020. She has been on tamoxifen. She became menopausal from the chemotherapy. She has been amenorrheic since that time. She has had no spotting or bleeding. She has had regular breast imaging and followup with Dr. Sveta Bradshaw. Her last mammogram was in March and she had an MRI of the breast in September. She denies any specific complaints at present. She sees Dr. Flavio Garcia for her general medical care. She had a DEXA scan in June 2021 with excellent bone density. I told her she could probably wait a while before she did this again. She is having symptoms that are suspicious for estrogen deficiency related to menopause. She has some foot stiffness. She has some bilateral hip tightness. She has been exercising. We talked about the need to consider exercise such as yoga or Pilates. She has been doing weight work and aerobic work. She also has some pain in the area of the right costochondral junction on the site of her previous breast cancer. She denies any vaginal spotting or bleeding.     MEDICATIONS:  Her current medications include azelastine nasal spray b.i.d., B-complex vitamins daily, biotin 10 mg daily, cetirizine 10 mg daily, ergocalciferol 2000 units daily, fluticasone propionate nasal spray nightly, montelukast sodium 10 mg nightly, multivitamin daily, probiotics daily, and tamoxifen 20 mg daily. PHYSICAL EXAMINATION:    GENERAL:  She is a well-appearing female in no acute distress. VITAL SIGNS:  Her performance status is 0. Her weight is 168 pounds. She is 5 feet 10 inches. Blood pressure is 120/76, pulse 76, respiratory rate is 20, temperature is 97.5. HEENT:  Unremarkable. LYMPHATICS:  She has no adenopathy. LUNGS:  Clear. HEART:  Normal.  ABDOMEN:  No hepatosplenomegaly or tenderness. EXTREMITIES:  She has no clubbing, cyanosis or edema. NEUROLOGIC:  She is intact. IMPRESSION:  Breast cancer. She is 2 years out from completion of TC chemotherapy and she has been on tamoxifen now for 2 years. She has been amenorrheic since taking her chemotherapy while at some point it could be switched over to an AI, one can argue to just keep her on the tamoxifen. Her original breast cancer was only a T1a lesion. She did take chemotherapy given the fact that she had a Oncotype score with benefit of the chemotherapy is likely for the more aggressive and less estrogen dependent component of her disease. In any case, I am keeping her on the tamoxifen for now. She understands it. I will be retiring in the spring. She was given a list of the physicians who see a great deal of breast cancer in this practice including Dr. Dante Lucas, Dr. Darrick Patrick and Dr. Araceli Fofana. She will do a little research and decide whom she would like to see. We also have a new physician, Dr. Joaquim Buchanan, who will be joining us in January and could also see her. She does have experienced in treating breast cancer as well. Otherwise, I have not really order any other testing for now and I told her she could probably wait a while before she does another DEXA scan given the normality of her results.     Dictated By Ro Ledbetter M.D.  d: 11/07/2022 06:42:20  t: 11/07/2022 44:91:30  [facility] [id number]  /    cc: Haleigh Tyson D.O.

## 2022-12-05 LAB
ABSOLUTE BASOPHILS: 39 CELLS/UL (ref 0–200)
ABSOLUTE EOSINOPHILS: 191 CELLS/UL (ref 15–500)
ABSOLUTE LYMPHOCYTES: 1181 CELLS/UL (ref 850–3900)
ABSOLUTE MONOCYTES: 544 CELLS/UL (ref 200–950)
ABSOLUTE NEUTROPHILS: 2945 CELLS/UL (ref 1500–7800)
ALBUMIN/GLOBULIN RATIO: 1.3 (CALC) (ref 1–2.5)
ALBUMIN: 3.9 G/DL (ref 3.6–5.1)
ALKALINE PHOSPHATASE: 48 U/L (ref 37–153)
ALT: 11 U/L (ref 6–29)
ANA SCREEN, IFA: NEGATIVE
AST: 17 U/L (ref 10–35)
BASOPHILS: 0.8 %
BILIRUBIN, TOTAL: 0.6 MG/DL (ref 0.2–1.2)
BUN: 18 MG/DL (ref 7–25)
C-REACTIVE PROTEIN: 0.7 MG/L
CALCIUM: 9.3 MG/DL (ref 8.6–10.4)
CARBON DIOXIDE: 27 MMOL/L (ref 20–32)
CHLORIDE: 107 MMOL/L (ref 98–110)
CHOL/HDLC RATIO: 2.2 (CALC)
CHOLESTEROL, TOTAL: 170 MG/DL
CREATININE: 0.73 MG/DL (ref 0.5–1.03)
EGFR: 97 ML/MIN/1.73M2
EOSINOPHILS: 3.9 %
GLOBULIN: 3 G/DL (CALC) (ref 1.9–3.7)
GLUCOSE: 101 MG/DL (ref 65–99)
HDL CHOLESTEROL: 78 MG/DL
HEMATOCRIT: 40.1 % (ref 35–45)
HEMOGLOBIN A1C: 5.3 % OF TOTAL HGB
HEMOGLOBIN: 13.5 G/DL (ref 11.7–15.5)
LDL-CHOLESTEROL: 79 MG/DL (CALC)
LYME AB SCREEN: <0.9 INDEX
LYMPHOCYTES: 24.1 %
MCH: 31.5 PG (ref 27–33)
MCHC: 33.7 G/DL (ref 32–36)
MCV: 93.5 FL (ref 80–100)
MONOCYTES: 11.1 %
MPV: 11.1 FL (ref 7.5–12.5)
NEUTROPHILS: 60.1 %
NON-HDL CHOLESTEROL: 92 MG/DL (CALC)
PLATELET COUNT: 175 THOUSAND/UL (ref 140–400)
POTASSIUM: 4.3 MMOL/L (ref 3.5–5.3)
PROTEIN, TOTAL: 6.9 G/DL (ref 6.1–8.1)
RDW: 12 % (ref 11–15)
RED BLOOD CELL COUNT: 4.29 MILLION/UL (ref 3.8–5.1)
RHEUMATOID FACTOR: 419 IU/ML
SED RATE BY MODIFIED$WESTERGREN: 6 MM/H
SODIUM: 141 MMOL/L (ref 135–146)
T4, FREE: 1 NG/DL (ref 0.8–1.8)
TRIGLYCERIDES: 53 MG/DL
TSH: 0.93 MIU/L
VITAMIN B12: 412 PG/ML (ref 200–1100)
VITAMIN D, 25-OH, TOTAL: 58 NG/ML (ref 30–100)
WHITE BLOOD CELL COUNT: 4.9 THOUSAND/UL (ref 3.8–10.8)

## 2022-12-12 ENCOUNTER — OFFICE VISIT (OUTPATIENT)
Dept: RHEUMATOLOGY | Facility: CLINIC | Age: 57
End: 2022-12-12
Payer: COMMERCIAL

## 2022-12-12 VITALS
BODY MASS INDEX: 23.91 KG/M2 | WEIGHT: 167 LBS | TEMPERATURE: 97 F | HEIGHT: 70 IN | SYSTOLIC BLOOD PRESSURE: 128 MMHG | OXYGEN SATURATION: 99 % | HEART RATE: 56 BPM | DIASTOLIC BLOOD PRESSURE: 80 MMHG | RESPIRATION RATE: 16 BRPM

## 2022-12-12 DIAGNOSIS — R76.8 RHEUMATOID FACTOR POSITIVE: Primary | ICD-10-CM

## 2022-12-12 DIAGNOSIS — M25.50 POLYARTHRALGIA: ICD-10-CM

## 2022-12-12 DIAGNOSIS — M79.672 BILATERAL FOOT PAIN: ICD-10-CM

## 2022-12-12 DIAGNOSIS — M79.671 BILATERAL FOOT PAIN: ICD-10-CM

## 2022-12-12 DIAGNOSIS — Z85.3 HISTORY OF BREAST CANCER: ICD-10-CM

## 2022-12-12 PROCEDURE — 3079F DIAST BP 80-89 MM HG: CPT | Performed by: INTERNAL MEDICINE

## 2022-12-12 PROCEDURE — 3074F SYST BP LT 130 MM HG: CPT | Performed by: INTERNAL MEDICINE

## 2022-12-12 PROCEDURE — 99205 OFFICE O/P NEW HI 60 MIN: CPT | Performed by: INTERNAL MEDICINE

## 2022-12-12 PROCEDURE — 3008F BODY MASS INDEX DOCD: CPT | Performed by: INTERNAL MEDICINE

## 2022-12-13 ENCOUNTER — LAB ENCOUNTER (OUTPATIENT)
Dept: LAB | Age: 57
End: 2022-12-13
Attending: INTERNAL MEDICINE
Payer: COMMERCIAL

## 2022-12-13 ENCOUNTER — OFFICE VISIT (OUTPATIENT)
Dept: FAMILY MEDICINE CLINIC | Facility: CLINIC | Age: 57
End: 2022-12-13
Payer: COMMERCIAL

## 2022-12-13 VITALS
BODY MASS INDEX: 24.31 KG/M2 | DIASTOLIC BLOOD PRESSURE: 70 MMHG | HEIGHT: 69.25 IN | OXYGEN SATURATION: 98 % | SYSTOLIC BLOOD PRESSURE: 106 MMHG | WEIGHT: 166 LBS | HEART RATE: 69 BPM | RESPIRATION RATE: 16 BRPM

## 2022-12-13 DIAGNOSIS — Z00.00 ANNUAL PHYSICAL EXAM: Primary | ICD-10-CM

## 2022-12-13 DIAGNOSIS — R76.8 RHEUMATOID FACTOR POSITIVE: ICD-10-CM

## 2022-12-13 DIAGNOSIS — Z23 NEED FOR VACCINATION: ICD-10-CM

## 2022-12-13 DIAGNOSIS — M25.50 POLYARTHRALGIA: ICD-10-CM

## 2022-12-13 LAB
HAV IGM SER QL: NONREACTIVE
HBV CORE IGM SER QL: NONREACTIVE
HBV SURFACE AG SERPL QL IA: NONREACTIVE
HCV AB SERPL QL IA: NONREACTIVE
RHEUMATOID FACT SERPL-ACNC: 414 IU/ML (ref ?–15)

## 2022-12-13 PROCEDURE — 86334 IMMUNOFIX E-PHORESIS SERUM: CPT

## 2022-12-13 PROCEDURE — 86200 CCP ANTIBODY: CPT | Performed by: INTERNAL MEDICINE

## 2022-12-13 PROCEDURE — 86431 RHEUMATOID FACTOR QUANT: CPT | Performed by: INTERNAL MEDICINE

## 2022-12-13 PROCEDURE — 83516 IMMUNOASSAY NONANTIBODY: CPT

## 2022-12-13 PROCEDURE — 36415 COLL VENOUS BLD VENIPUNCTURE: CPT

## 2022-12-13 PROCEDURE — 80074 ACUTE HEPATITIS PANEL: CPT | Performed by: INTERNAL MEDICINE

## 2022-12-13 PROCEDURE — 86036 ANCA SCREEN EACH ANTIBODY: CPT

## 2022-12-13 PROCEDURE — 3008F BODY MASS INDEX DOCD: CPT | Performed by: FAMILY MEDICINE

## 2022-12-13 PROCEDURE — 84165 PROTEIN E-PHORESIS SERUM: CPT

## 2022-12-13 PROCEDURE — 99396 PREV VISIT EST AGE 40-64: CPT | Performed by: FAMILY MEDICINE

## 2022-12-13 PROCEDURE — 3078F DIAST BP <80 MM HG: CPT | Performed by: FAMILY MEDICINE

## 2022-12-13 PROCEDURE — 83521 IG LIGHT CHAINS FREE EACH: CPT

## 2022-12-13 PROCEDURE — 3074F SYST BP LT 130 MM HG: CPT | Performed by: FAMILY MEDICINE

## 2022-12-15 LAB
ALBUMIN SERPL ELPH-MCNC: 4.18 G/DL (ref 3.75–5.21)
ALBUMIN/GLOB SERPL: 1.38 {RATIO} (ref 1–2)
ALPHA1 GLOB SERPL ELPH-MCNC: 0.26 G/DL (ref 0.19–0.46)
ALPHA2 GLOB SERPL ELPH-MCNC: 0.63 G/DL (ref 0.48–1.05)
B-GLOBULIN SERPL ELPH-MCNC: 0.76 G/DL (ref 0.68–1.23)
CCP IGG SERPL-ACNC: 0.8 U/ML (ref 0–6.9)
GAMMA GLOB SERPL ELPH-MCNC: 1.37 G/DL (ref 0.62–1.7)
KAPPA LC FREE SER-MCNC: 1.75 MG/DL (ref 0.33–1.94)
KAPPA LC FREE/LAMBDA FREE SER NEPH: 0.96 {RATIO} (ref 0.26–1.65)
LAMBDA LC FREE SERPL-MCNC: 1.83 MG/DL (ref 0.57–2.63)
PROT SERPL-MCNC: 7.2 G/DL (ref 6.4–8.2)

## 2022-12-15 RX ORDER — MONTELUKAST SODIUM 10 MG/1
TABLET ORAL
Qty: 90 TABLET | Refills: 3 | Status: SHIPPED | OUTPATIENT
Start: 2022-12-15

## 2022-12-16 ENCOUNTER — PATIENT MESSAGE (OUTPATIENT)
Dept: FAMILY MEDICINE CLINIC | Facility: CLINIC | Age: 57
End: 2022-12-16

## 2022-12-16 ENCOUNTER — HOSPITAL ENCOUNTER (OUTPATIENT)
Dept: GENERAL RADIOLOGY | Age: 57
Discharge: HOME | End: 2022-12-16
Attending: INTERNAL MEDICINE
Payer: COMMERCIAL

## 2022-12-16 DIAGNOSIS — R76.8 RHEUMATOID FACTOR POSITIVE: ICD-10-CM

## 2022-12-16 DIAGNOSIS — M25.50 POLYARTHRALGIA: ICD-10-CM

## 2022-12-16 PROCEDURE — 71046 X-RAY EXAM CHEST 2 VIEWS: CPT | Performed by: INTERNAL MEDICINE

## 2022-12-16 RX ORDER — MONTELUKAST SODIUM 10 MG/1
10 TABLET ORAL NIGHTLY
Qty: 90 TABLET | Refills: 0 | OUTPATIENT
Start: 2022-12-16

## 2022-12-16 NOTE — TELEPHONE ENCOUNTER
From: Edis Matson  To: Alla Garzon DO  Sent: 12/16/2022 10:17 AM CST  Subject: Singulair    I am almost out of singulair and sent in a request for a refill through the mail order [facility]. It looks like it was denied. Can you tell me if it was already submitted for refill? Thank you.

## 2022-12-17 LAB
MYELOPEROX ANTIBODIES, IGG: 0 AU/ML
SERINE PROTEASE 3, IGG: 0 AU/ML

## 2022-12-27 ENCOUNTER — VIRTUAL PHONE E/M (OUTPATIENT)
Dept: RHEUMATOLOGY | Facility: CLINIC | Age: 57
End: 2022-12-27
Payer: COMMERCIAL

## 2022-12-27 DIAGNOSIS — M79.672 BILATERAL FOOT PAIN: ICD-10-CM

## 2022-12-27 DIAGNOSIS — M25.50 POLYARTHRALGIA: ICD-10-CM

## 2022-12-27 DIAGNOSIS — M79.671 BILATERAL FOOT PAIN: ICD-10-CM

## 2022-12-27 DIAGNOSIS — Z85.3 HISTORY OF BREAST CANCER: ICD-10-CM

## 2022-12-27 DIAGNOSIS — R76.8 RHEUMATOID FACTOR POSITIVE: Primary | ICD-10-CM

## 2022-12-27 PROCEDURE — 99442 PHONE E/M BY PHYS 11-20 MIN: CPT | Performed by: INTERNAL MEDICINE

## 2023-01-05 ENCOUNTER — VIRTUAL PHONE E/M (OUTPATIENT)
Dept: RHEUMATOLOGY | Facility: CLINIC | Age: 58
End: 2023-01-05
Payer: COMMERCIAL

## 2023-01-05 ENCOUNTER — PATIENT MESSAGE (OUTPATIENT)
Dept: RHEUMATOLOGY | Facility: CLINIC | Age: 58
End: 2023-01-05

## 2023-01-05 DIAGNOSIS — M79.672 BILATERAL FOOT PAIN: ICD-10-CM

## 2023-01-05 DIAGNOSIS — M25.552 BILATERAL HIP PAIN: ICD-10-CM

## 2023-01-05 DIAGNOSIS — R76.8 RHEUMATOID FACTOR POSITIVE: Primary | ICD-10-CM

## 2023-01-05 DIAGNOSIS — M79.671 BILATERAL FOOT PAIN: ICD-10-CM

## 2023-01-05 DIAGNOSIS — G89.29 CHRONIC MIDLINE LOW BACK PAIN WITHOUT SCIATICA: ICD-10-CM

## 2023-01-05 DIAGNOSIS — M54.50 CHRONIC MIDLINE LOW BACK PAIN WITHOUT SCIATICA: ICD-10-CM

## 2023-01-05 DIAGNOSIS — M54.50 CHRONIC MIDLINE LOW BACK PAIN WITHOUT SCIATICA: Primary | ICD-10-CM

## 2023-01-05 DIAGNOSIS — G89.29 CHRONIC MIDLINE LOW BACK PAIN WITHOUT SCIATICA: Primary | ICD-10-CM

## 2023-01-05 DIAGNOSIS — M25.551 BILATERAL HIP PAIN: ICD-10-CM

## 2023-01-05 PROCEDURE — 99442 PHONE E/M BY PHYS 11-20 MIN: CPT | Performed by: INTERNAL MEDICINE

## 2023-01-13 ENCOUNTER — HOSPITAL ENCOUNTER (OUTPATIENT)
Dept: GENERAL RADIOLOGY | Age: 58
Discharge: HOME | End: 2023-01-13
Attending: INTERNAL MEDICINE
Payer: COMMERCIAL

## 2023-01-13 DIAGNOSIS — G89.29 CHRONIC MIDLINE LOW BACK PAIN WITHOUT SCIATICA: ICD-10-CM

## 2023-01-13 DIAGNOSIS — M54.50 CHRONIC MIDLINE LOW BACK PAIN WITHOUT SCIATICA: ICD-10-CM

## 2023-01-13 PROCEDURE — 72110 X-RAY EXAM L-2 SPINE 4/>VWS: CPT | Performed by: INTERNAL MEDICINE

## 2023-01-23 ENCOUNTER — OFFICE VISIT (OUTPATIENT)
Dept: PODIATRY CLINIC | Facility: CLINIC | Age: 58
End: 2023-01-23

## 2023-01-23 DIAGNOSIS — M25.674 JOINT STIFFNESS OF BOTH FEET: Primary | ICD-10-CM

## 2023-01-23 DIAGNOSIS — M25.675 JOINT STIFFNESS OF BOTH FEET: Primary | ICD-10-CM

## 2023-01-23 PROCEDURE — 99203 OFFICE O/P NEW LOW 30 MIN: CPT | Performed by: PODIATRIST

## 2023-01-25 ENCOUNTER — PATIENT MESSAGE (OUTPATIENT)
Dept: RHEUMATOLOGY | Facility: CLINIC | Age: 58
End: 2023-01-25

## 2023-01-25 DIAGNOSIS — R76.8 RHEUMATOID FACTOR POSITIVE: ICD-10-CM

## 2023-01-25 DIAGNOSIS — M79.671 BILATERAL FOOT PAIN: ICD-10-CM

## 2023-01-25 DIAGNOSIS — M05.772 RHEUMATOID ARTHRITIS INVOLVING BOTH FEET WITH POSITIVE RHEUMATOID FACTOR (HCC): Primary | ICD-10-CM

## 2023-01-25 DIAGNOSIS — M79.672 BILATERAL FOOT PAIN: ICD-10-CM

## 2023-01-25 DIAGNOSIS — M05.771 RHEUMATOID ARTHRITIS INVOLVING BOTH FEET WITH POSITIVE RHEUMATOID FACTOR (HCC): Primary | ICD-10-CM

## 2023-01-25 RX ORDER — HYDROXYCHLOROQUINE SULFATE 200 MG/1
200 TABLET, FILM COATED ORAL 2 TIMES DAILY
Qty: 180 TABLET | Refills: 0 | Status: SHIPPED | OUTPATIENT
Start: 2023-01-25

## 2023-02-17 ENCOUNTER — OFFICE VISIT (OUTPATIENT)
Dept: FAMILY MEDICINE CLINIC | Facility: CLINIC | Age: 58
End: 2023-02-17
Payer: COMMERCIAL

## 2023-02-17 VITALS
BODY MASS INDEX: 24.31 KG/M2 | WEIGHT: 166 LBS | OXYGEN SATURATION: 99 % | DIASTOLIC BLOOD PRESSURE: 68 MMHG | RESPIRATION RATE: 20 BRPM | HEIGHT: 69.25 IN | HEART RATE: 69 BPM | SYSTOLIC BLOOD PRESSURE: 110 MMHG

## 2023-02-17 DIAGNOSIS — J01.00 ACUTE NON-RECURRENT MAXILLARY SINUSITIS: Primary | ICD-10-CM

## 2023-02-17 PROCEDURE — 3008F BODY MASS INDEX DOCD: CPT | Performed by: FAMILY MEDICINE

## 2023-02-17 PROCEDURE — 99213 OFFICE O/P EST LOW 20 MIN: CPT | Performed by: FAMILY MEDICINE

## 2023-02-17 PROCEDURE — 3078F DIAST BP <80 MM HG: CPT | Performed by: FAMILY MEDICINE

## 2023-02-17 PROCEDURE — 3074F SYST BP LT 130 MM HG: CPT | Performed by: FAMILY MEDICINE

## 2023-02-17 RX ORDER — AMOXICILLIN AND CLAVULANATE POTASSIUM 875; 125 MG/1; MG/1
1 TABLET, FILM COATED ORAL 2 TIMES DAILY
Qty: 20 TABLET | Refills: 0 | Status: SHIPPED | OUTPATIENT
Start: 2023-02-17 | End: 2023-02-27

## 2023-03-02 ENCOUNTER — TELEMEDICINE (OUTPATIENT)
Dept: RHEUMATOLOGY | Facility: CLINIC | Age: 58
End: 2023-03-02
Payer: COMMERCIAL

## 2023-03-02 ENCOUNTER — TELEPHONE (OUTPATIENT)
Dept: HEMATOLOGY/ONCOLOGY | Facility: HOSPITAL | Age: 58
End: 2023-03-02

## 2023-03-02 VITALS — HEIGHT: 69.25 IN | BODY MASS INDEX: 24.16 KG/M2 | WEIGHT: 165 LBS

## 2023-03-02 DIAGNOSIS — G89.29 CHRONIC MIDLINE LOW BACK PAIN WITHOUT SCIATICA: ICD-10-CM

## 2023-03-02 DIAGNOSIS — M25.50 POLYARTHRALGIA: ICD-10-CM

## 2023-03-02 DIAGNOSIS — M05.772 RHEUMATOID ARTHRITIS INVOLVING BOTH FEET WITH POSITIVE RHEUMATOID FACTOR (HCC): Primary | ICD-10-CM

## 2023-03-02 DIAGNOSIS — Z85.3 HISTORY OF BREAST CANCER: ICD-10-CM

## 2023-03-02 DIAGNOSIS — M54.50 CHRONIC MIDLINE LOW BACK PAIN WITHOUT SCIATICA: ICD-10-CM

## 2023-03-02 DIAGNOSIS — M05.771 RHEUMATOID ARTHRITIS INVOLVING BOTH FEET WITH POSITIVE RHEUMATOID FACTOR (HCC): Primary | ICD-10-CM

## 2023-03-02 DIAGNOSIS — M89.8X9 BONE PAIN: ICD-10-CM

## 2023-03-02 DIAGNOSIS — Z85.3 HISTORY OF BREAST CANCER: Primary | ICD-10-CM

## 2023-03-02 PROCEDURE — 99215 OFFICE O/P EST HI 40 MIN: CPT | Performed by: INTERNAL MEDICINE

## 2023-03-02 RX ORDER — PREDNISONE 10 MG/1
TABLET ORAL
Qty: 30 TABLET | Refills: 0 | Status: SHIPPED | OUTPATIENT
Start: 2023-03-02

## 2023-03-02 NOTE — TELEPHONE ENCOUNTER
Spoke to patient who had early stage breast cancer - having diffuse pain. Undergoing rheumatologic evaluation but is worried that this could be related to recurrence of cancer in bones. Wanted a PET but I told her a bone scan would probably be a more reasonable place to start. Order placed - will ask her to call central scheduling.   MARLENA Chacon

## 2023-03-03 ENCOUNTER — HOSPITAL ENCOUNTER (OUTPATIENT)
Dept: MAMMOGRAPHY | Facility: HOSPITAL | Age: 58
Discharge: HOME | End: 2023-03-03
Attending: SURGERY
Payer: COMMERCIAL

## 2023-03-03 DIAGNOSIS — C50.211 MALIGNANT NEOPLASM OF UPPER-INNER QUADRANT OF RIGHT BREAST IN FEMALE, ESTROGEN RECEPTOR POSITIVE (HCC): ICD-10-CM

## 2023-03-03 DIAGNOSIS — Z17.0 MALIGNANT NEOPLASM OF UPPER-INNER QUADRANT OF RIGHT BREAST IN FEMALE, ESTROGEN RECEPTOR POSITIVE (HCC): ICD-10-CM

## 2023-03-03 PROCEDURE — 77066 DX MAMMO INCL CAD BI: CPT | Performed by: SURGERY

## 2023-03-03 PROCEDURE — 77062 BREAST TOMOSYNTHESIS BI: CPT | Performed by: SURGERY

## 2023-03-17 ENCOUNTER — HOSPITAL ENCOUNTER (OUTPATIENT)
Dept: NUCLEAR MEDICINE | Facility: HOSPITAL | Age: 58
Discharge: HOME | End: 2023-03-17
Attending: INTERNAL MEDICINE
Payer: COMMERCIAL

## 2023-03-17 DIAGNOSIS — M89.8X9 BONE PAIN: ICD-10-CM

## 2023-03-17 DIAGNOSIS — Z85.3 HISTORY OF BREAST CANCER: ICD-10-CM

## 2023-03-17 PROCEDURE — 78306 BONE IMAGING WHOLE BODY: CPT | Performed by: INTERNAL MEDICINE

## 2023-03-17 PROCEDURE — 78832 RP LOCLZJ TUM SPECT W/CT 2: CPT | Performed by: INTERNAL MEDICINE

## 2023-03-20 ENCOUNTER — OFFICE VISIT (OUTPATIENT)
Dept: SURGERY | Facility: CLINIC | Age: 58
End: 2023-03-20
Payer: COMMERCIAL

## 2023-03-20 VITALS
HEART RATE: 73 BPM | SYSTOLIC BLOOD PRESSURE: 115 MMHG | WEIGHT: 169.38 LBS | TEMPERATURE: 98 F | OXYGEN SATURATION: 97 % | HEIGHT: 69.25 IN | DIASTOLIC BLOOD PRESSURE: 78 MMHG | RESPIRATION RATE: 16 BRPM | BODY MASS INDEX: 24.8 KG/M2

## 2023-03-20 DIAGNOSIS — C50.211 MALIGNANT NEOPLASM OF UPPER-INNER QUADRANT OF RIGHT BREAST IN FEMALE, ESTROGEN RECEPTOR POSITIVE (HCC): Primary | ICD-10-CM

## 2023-03-20 DIAGNOSIS — Z17.0 MALIGNANT NEOPLASM OF UPPER-INNER QUADRANT OF RIGHT BREAST IN FEMALE, ESTROGEN RECEPTOR POSITIVE (HCC): Primary | ICD-10-CM

## 2023-03-20 PROCEDURE — 3008F BODY MASS INDEX DOCD: CPT | Performed by: SURGERY

## 2023-03-20 PROCEDURE — 99213 OFFICE O/P EST LOW 20 MIN: CPT | Performed by: SURGERY

## 2023-04-07 ENCOUNTER — OFFICE VISIT (OUTPATIENT)
Dept: PODIATRY CLINIC | Facility: CLINIC | Age: 58
End: 2023-04-07

## 2023-04-07 DIAGNOSIS — M06.9 RHEUMATOID ARTHRITIS INVOLVING BOTH FEET, UNSPECIFIED WHETHER RHEUMATOID FACTOR PRESENT (HCC): ICD-10-CM

## 2023-04-07 DIAGNOSIS — M20.5X1 HALLUX LIMITUS, RIGHT: ICD-10-CM

## 2023-04-07 DIAGNOSIS — M21.622 TAILOR'S BUNION OF BOTH FEET: ICD-10-CM

## 2023-04-07 DIAGNOSIS — M79.671 RIGHT FOOT PAIN: ICD-10-CM

## 2023-04-07 DIAGNOSIS — M20.21 HALLUX RIGIDUS, RIGHT FOOT: Primary | ICD-10-CM

## 2023-04-07 DIAGNOSIS — M79.672 LEFT FOOT PAIN: ICD-10-CM

## 2023-04-07 DIAGNOSIS — M21.619 BUNION: ICD-10-CM

## 2023-04-07 DIAGNOSIS — M21.621 TAILOR'S BUNION OF BOTH FEET: ICD-10-CM

## 2023-04-07 PROCEDURE — 99213 OFFICE O/P EST LOW 20 MIN: CPT | Performed by: STUDENT IN AN ORGANIZED HEALTH CARE EDUCATION/TRAINING PROGRAM

## 2023-04-10 ENCOUNTER — PATIENT MESSAGE (OUTPATIENT)
Dept: PODIATRY CLINIC | Facility: CLINIC | Age: 58
End: 2023-04-10

## 2023-04-10 ENCOUNTER — TELEPHONE (OUTPATIENT)
Dept: PODIATRY CLINIC | Facility: CLINIC | Age: 58
End: 2023-04-10

## 2023-04-10 NOTE — TELEPHONE ENCOUNTER
Pt called stating pt has an appointment on Friday 4-7-23. Discussed xray results. Forgot pt also had mri done in December at [facility].   Are you able to view the results of the mri.   Please call pt

## 2023-04-10 NOTE — TELEPHONE ENCOUNTER
4/7/23 Patient seen in clinic no note available requesting you review MRI results of right foot from december please advise if you access to these results or need us to inquiry further.

## 2023-04-12 NOTE — PATIENT INSTRUCTIONS
-Can plan for orthotic casting once authorization is complete. Ambulate with supportive shoes and avoid walking barefoot. Follow-up with rheumatology for medical management of RA. Can consider surgical intervention if symptoms worsen or fail to improve.

## 2023-04-21 DIAGNOSIS — M79.671 BILATERAL FOOT PAIN: ICD-10-CM

## 2023-04-21 DIAGNOSIS — M05.772 RHEUMATOID ARTHRITIS INVOLVING BOTH FEET WITH POSITIVE RHEUMATOID FACTOR (HCC): ICD-10-CM

## 2023-04-21 DIAGNOSIS — M05.771 RHEUMATOID ARTHRITIS INVOLVING BOTH FEET WITH POSITIVE RHEUMATOID FACTOR (HCC): ICD-10-CM

## 2023-04-21 DIAGNOSIS — R76.8 RHEUMATOID FACTOR POSITIVE: ICD-10-CM

## 2023-04-21 DIAGNOSIS — M79.672 BILATERAL FOOT PAIN: ICD-10-CM

## 2023-04-21 RX ORDER — HYDROXYCHLOROQUINE SULFATE 200 MG/1
200 TABLET, FILM COATED ORAL 2 TIMES DAILY
Qty: 180 TABLET | Refills: 0 | Status: SHIPPED | OUTPATIENT
Start: 2023-04-21

## 2023-04-21 NOTE — TELEPHONE ENCOUNTER
Future Appointments   Date Time Provider Catalina Chris   5/5/2023  2:00 PM MD Gee Rodriguez [address]  3/2/2023 video visit     Last refill 1/25/2023  180 tabs, 0 refills

## 2023-05-05 ENCOUNTER — APPOINTMENT (OUTPATIENT)
Dept: HEMATOLOGY/ONCOLOGY | Facility: HOSPITAL | Age: 58
End: 2023-05-05
Attending: INTERNAL MEDICINE
Payer: COMMERCIAL

## 2023-05-05 ENCOUNTER — OFFICE VISIT (OUTPATIENT)
Dept: HEMATOLOGY/ONCOLOGY | Facility: HOSPITAL | Age: 58
End: 2023-05-05
Attending: SPECIALIST
Payer: COMMERCIAL

## 2023-05-05 VITALS
HEIGHT: 69.49 IN | BODY MASS INDEX: 24.61 KG/M2 | RESPIRATION RATE: 18 BRPM | OXYGEN SATURATION: 97 % | WEIGHT: 170 LBS | DIASTOLIC BLOOD PRESSURE: 70 MMHG | HEART RATE: 76 BPM | SYSTOLIC BLOOD PRESSURE: 109 MMHG | TEMPERATURE: 99 F

## 2023-05-05 DIAGNOSIS — Z79.810 LONG-TERM CURRENT USE OF TAMOXIFEN: Primary | ICD-10-CM

## 2023-05-05 DIAGNOSIS — Z17.0 MALIGNANT NEOPLASM OF UPPER-INNER QUADRANT OF RIGHT BREAST IN FEMALE, ESTROGEN RECEPTOR POSITIVE (HCC): ICD-10-CM

## 2023-05-05 DIAGNOSIS — C50.211 MALIGNANT NEOPLASM OF UPPER-INNER QUADRANT OF RIGHT BREAST IN FEMALE, ESTROGEN RECEPTOR POSITIVE (HCC): ICD-10-CM

## 2023-05-05 PROCEDURE — 99215 OFFICE O/P EST HI 40 MIN: CPT | Performed by: SPECIALIST

## 2023-05-05 PROCEDURE — 99417 PROLNG OP E/M EACH 15 MIN: CPT | Performed by: SPECIALIST

## 2023-05-05 NOTE — PROGRESS NOTES
Patient is here today for follow up with Sarah Lopez for Malignant neoplasm right breast. Former patient of Dr. Oni Cabrera. Currently on Tamoxifen therapy. Patient denies pain. Diagnosed with Rheumatoid Arthritis 1/2023. Stated mild hot flashes. Medication list,medical history and toxicities were reviewed and updated. Education Record    Learner:  Patient      Disease / Diagnosis: Malignant neoplasm of the right breast.    Barriers / Limitations:  None   Comments:    Method:  Brief focused, Discussion, Printed material and Reinforcement   Comments:    General Topics:  Medication, Pain, Procedure and Plan of care reviewed   Comments:    Outcome:  Shows understanding   Comments:    AVS provided and follow up reviewed. Patient instructed to call as needed.

## 2023-05-08 ENCOUNTER — OFFICE VISIT (OUTPATIENT)
Dept: FAMILY MEDICINE CLINIC | Facility: CLINIC | Age: 58
End: 2023-05-08
Payer: COMMERCIAL

## 2023-05-08 VITALS
BODY MASS INDEX: 23.62 KG/M2 | DIASTOLIC BLOOD PRESSURE: 46 MMHG | OXYGEN SATURATION: 98 % | RESPIRATION RATE: 16 BRPM | HEIGHT: 70 IN | TEMPERATURE: 98 F | HEART RATE: 61 BPM | WEIGHT: 165 LBS | SYSTOLIC BLOOD PRESSURE: 110 MMHG

## 2023-05-08 DIAGNOSIS — J01.00 ACUTE NON-RECURRENT MAXILLARY SINUSITIS: Primary | ICD-10-CM

## 2023-05-08 RX ORDER — AMOXICILLIN AND CLAVULANATE POTASSIUM 875; 125 MG/1; MG/1
1 TABLET, FILM COATED ORAL 2 TIMES DAILY
Qty: 20 TABLET | Refills: 0 | Status: SHIPPED | OUTPATIENT
Start: 2023-05-08 | End: 2023-05-18

## 2023-05-08 NOTE — PATIENT INSTRUCTIONS
Take antibiotics with food and plenty of water. Eat yogurt or take probiotic daily. (Sharyle Colas is a good example of an OTC probiotic)  Make sure to finish the entire antibiotic treatment. Increase fluids and rest.   Use otc meds as needed. Monitor symptoms and contact the office if no better in 2-3 days.

## 2023-05-10 ENCOUNTER — PATIENT MESSAGE (OUTPATIENT)
Dept: FAMILY MEDICINE CLINIC | Facility: CLINIC | Age: 58
End: 2023-05-10

## 2023-05-10 DIAGNOSIS — Z13.820 SCREENING FOR OSTEOPOROSIS: Primary | ICD-10-CM

## 2023-06-05 RX ORDER — CETIRIZINE HYDROCHLORIDE 10 MG/1
10 TABLET ORAL DAILY
Qty: 90 TABLET | Refills: 0 | Status: SHIPPED | OUTPATIENT
Start: 2023-06-05

## 2023-06-15 ENCOUNTER — HOSPITAL ENCOUNTER (OUTPATIENT)
Dept: BONE DENSITY | Age: 58
Discharge: HOME | End: 2023-06-15
Attending: FAMILY MEDICINE
Payer: COMMERCIAL

## 2023-06-15 DIAGNOSIS — Z13.820 SCREENING FOR OSTEOPOROSIS: ICD-10-CM

## 2023-06-15 PROCEDURE — 77080 DXA BONE DENSITY AXIAL: CPT | Performed by: FAMILY MEDICINE

## 2023-06-27 ENCOUNTER — TELEPHONE (OUTPATIENT)
Dept: RHEUMATOLOGY | Facility: CLINIC | Age: 58
End: 2023-06-27

## 2023-06-27 DIAGNOSIS — M79.672 BILATERAL FOOT PAIN: ICD-10-CM

## 2023-06-27 DIAGNOSIS — M05.772 RHEUMATOID ARTHRITIS INVOLVING BOTH FEET WITH POSITIVE RHEUMATOID FACTOR (HCC): Primary | ICD-10-CM

## 2023-06-27 DIAGNOSIS — R76.8 RHEUMATOID FACTOR POSITIVE: ICD-10-CM

## 2023-06-27 DIAGNOSIS — M79.671 BILATERAL FOOT PAIN: ICD-10-CM

## 2023-06-27 DIAGNOSIS — M05.772 RHEUMATOID ARTHRITIS INVOLVING BOTH FEET WITH POSITIVE RHEUMATOID FACTOR (HCC): ICD-10-CM

## 2023-06-27 DIAGNOSIS — M05.771 RHEUMATOID ARTHRITIS INVOLVING BOTH FEET WITH POSITIVE RHEUMATOID FACTOR (HCC): ICD-10-CM

## 2023-06-27 DIAGNOSIS — M05.771 RHEUMATOID ARTHRITIS INVOLVING BOTH FEET WITH POSITIVE RHEUMATOID FACTOR (HCC): Primary | ICD-10-CM

## 2023-06-27 RX ORDER — HYDROXYCHLOROQUINE SULFATE 200 MG/1
200 TABLET, FILM COATED ORAL 2 TIMES DAILY
Qty: 180 TABLET | Refills: 0 | Status: SHIPPED | OUTPATIENT
Start: 2023-06-27 | End: 2023-06-27

## 2023-06-27 RX ORDER — HYDROXYCHLOROQUINE SULFATE 200 MG/1
200 TABLET, FILM COATED ORAL 2 TIMES DAILY
Qty: 30 TABLET | Refills: 0 | Status: SHIPPED | OUTPATIENT
Start: 2023-06-27

## 2023-06-27 RX ORDER — HYDROXYCHLOROQUINE SULFATE 200 MG/1
200 TABLET, FILM COATED ORAL 2 TIMES DAILY
Qty: 60 TABLET | Refills: 0 | Status: SHIPPED | OUTPATIENT
Start: 2023-06-27 | End: 2023-06-27

## 2023-06-28 RX ORDER — HYDROXYCHLOROQUINE SULFATE 200 MG/1
200 TABLET, FILM COATED ORAL 2 TIMES DAILY
Qty: 180 TABLET | Refills: 0 | Status: SHIPPED | OUTPATIENT
Start: 2023-06-28

## 2023-07-26 RX ORDER — TAMOXIFEN CITRATE 20 MG/1
20 TABLET ORAL DAILY
Qty: 90 TABLET | Refills: 3 | Status: SHIPPED | OUTPATIENT
Start: 2023-07-26

## 2023-07-26 NOTE — TELEPHONE ENCOUNTER
[address], [facility] [phone], [phone] is requesting a refill for Tamoxifen Citrate Tablets 20 mg, Qty, 90, Refills: 3, Sig: Take 1 tablet Daily

## 2023-09-26 ENCOUNTER — OFFICE VISIT (OUTPATIENT)
Dept: RHEUMATOLOGY | Facility: CLINIC | Age: 58
End: 2023-09-26
Payer: COMMERCIAL

## 2023-09-26 VITALS
RESPIRATION RATE: 16 BRPM | SYSTOLIC BLOOD PRESSURE: 124 MMHG | WEIGHT: 167 LBS | TEMPERATURE: 98 F | HEIGHT: 65 IN | BODY MASS INDEX: 27.82 KG/M2 | OXYGEN SATURATION: 99 % | DIASTOLIC BLOOD PRESSURE: 64 MMHG | HEART RATE: 66 BPM

## 2023-09-26 DIAGNOSIS — G89.29 CHRONIC PAIN OF RIGHT THUMB: ICD-10-CM

## 2023-09-26 DIAGNOSIS — M25.50 POLYARTHRALGIA: ICD-10-CM

## 2023-09-26 DIAGNOSIS — M05.771 RHEUMATOID ARTHRITIS INVOLVING BOTH FEET WITH POSITIVE RHEUMATOID FACTOR (HCC): Primary | ICD-10-CM

## 2023-09-26 DIAGNOSIS — Z91.81 HISTORY OF FALL: ICD-10-CM

## 2023-09-26 DIAGNOSIS — Z85.3 HISTORY OF BREAST CANCER: ICD-10-CM

## 2023-09-26 DIAGNOSIS — M53.3 CHRONIC SI JOINT PAIN: ICD-10-CM

## 2023-09-26 DIAGNOSIS — M79.644 CHRONIC PAIN OF RIGHT THUMB: ICD-10-CM

## 2023-09-26 DIAGNOSIS — G89.29 CHRONIC SI JOINT PAIN: ICD-10-CM

## 2023-09-26 DIAGNOSIS — M25.551 BILATERAL HIP PAIN: ICD-10-CM

## 2023-09-26 DIAGNOSIS — M05.772 RHEUMATOID ARTHRITIS INVOLVING BOTH FEET WITH POSITIVE RHEUMATOID FACTOR (HCC): Primary | ICD-10-CM

## 2023-09-26 DIAGNOSIS — M25.552 BILATERAL HIP PAIN: ICD-10-CM

## 2023-09-26 PROCEDURE — 3074F SYST BP LT 130 MM HG: CPT | Performed by: INTERNAL MEDICINE

## 2023-09-26 PROCEDURE — 3078F DIAST BP <80 MM HG: CPT | Performed by: INTERNAL MEDICINE

## 2023-09-26 PROCEDURE — 99214 OFFICE O/P EST MOD 30 MIN: CPT | Performed by: INTERNAL MEDICINE

## 2023-09-26 PROCEDURE — 3008F BODY MASS INDEX DOCD: CPT | Performed by: INTERNAL MEDICINE

## 2023-09-26 RX ORDER — HYDROXYCHLOROQUINE SULFATE 200 MG/1
200 TABLET, FILM COATED ORAL 2 TIMES DAILY
Qty: 180 TABLET | Refills: 0 | Status: SHIPPED | OUTPATIENT
Start: 2023-09-26

## 2023-09-27 NOTE — PATIENT INSTRUCTIONS
-- continue plaquenil but decrease dose to be weight based (so start alternating 200mg with 400mg every other day)  -- remember to get annual eye exams to monitor for toxicity from plaquenil   -- get updated xrays of the SI joints to evaluate for inflammation vs arthritis  -- labs 4 weeks after decreasing plaquenil dose  -- start wearing spica splint for the thumb pain  -- continue management otherwise and following with other specialists  -- follow up in 6 months or sooner as needed    Dr. Alvina Gresham

## 2023-09-28 ENCOUNTER — HOSPITAL ENCOUNTER (OUTPATIENT)
Dept: GENERAL RADIOLOGY | Age: 58
Discharge: HOME | End: 2023-09-28
Attending: INTERNAL MEDICINE
Payer: COMMERCIAL

## 2023-09-28 DIAGNOSIS — M05.771 RHEUMATOID ARTHRITIS INVOLVING BOTH FEET WITH POSITIVE RHEUMATOID FACTOR (HCC): ICD-10-CM

## 2023-09-28 DIAGNOSIS — G89.29 CHRONIC SI JOINT PAIN: ICD-10-CM

## 2023-09-28 DIAGNOSIS — G89.29 CHRONIC PAIN OF RIGHT THUMB: ICD-10-CM

## 2023-09-28 DIAGNOSIS — M53.3 CHRONIC SI JOINT PAIN: ICD-10-CM

## 2023-09-28 DIAGNOSIS — M05.772 RHEUMATOID ARTHRITIS INVOLVING BOTH FEET WITH POSITIVE RHEUMATOID FACTOR (HCC): ICD-10-CM

## 2023-09-28 DIAGNOSIS — Z91.81 HISTORY OF FALL: ICD-10-CM

## 2023-09-28 DIAGNOSIS — M79.644 CHRONIC PAIN OF RIGHT THUMB: ICD-10-CM

## 2023-09-28 PROCEDURE — 72202 X-RAY EXAM SI JOINTS 3/> VWS: CPT | Performed by: INTERNAL MEDICINE

## 2023-09-28 PROCEDURE — 73130 X-RAY EXAM OF HAND: CPT | Performed by: INTERNAL MEDICINE

## 2023-10-10 ENCOUNTER — HOSPITAL ENCOUNTER (OUTPATIENT)
Dept: MRI IMAGING | Facility: HOSPITAL | Age: 58
Discharge: HOME | End: 2023-10-10
Attending: SURGERY
Payer: COMMERCIAL

## 2023-10-10 DIAGNOSIS — C50.211 MALIGNANT NEOPLASM OF UPPER-INNER QUADRANT OF RIGHT BREAST IN FEMALE, ESTROGEN RECEPTOR POSITIVE: ICD-10-CM

## 2023-10-10 DIAGNOSIS — Z17.0 MALIGNANT NEOPLASM OF UPPER-INNER QUADRANT OF RIGHT BREAST IN FEMALE, ESTROGEN RECEPTOR POSITIVE: ICD-10-CM

## 2023-10-10 PROCEDURE — A9575 INJ GADOTERATE MEGLUMI 0.1ML: HCPCS

## 2023-10-10 PROCEDURE — 77049 MRI BREAST C-+ W/CAD BI: CPT | Performed by: SURGERY

## 2023-10-10 RX ORDER — GADOTERATE MEGLUMINE 376.9 MG/ML
20 INJECTION INTRAVENOUS
Status: COMPLETED | OUTPATIENT
Start: 2023-10-10 | End: 2023-10-10

## 2023-10-10 RX ADMIN — GADOTERATE MEGLUMINE 16 ML: 376.9 INJECTION INTRAVENOUS at 17:42:00

## 2023-10-25 ENCOUNTER — LAB ENCOUNTER (OUTPATIENT)
Dept: LAB | Age: 58
End: 2023-10-25
Attending: INTERNAL MEDICINE

## 2023-10-25 DIAGNOSIS — M05.771 RHEUMATOID ARTHRITIS INVOLVING BOTH FEET WITH POSITIVE RHEUMATOID FACTOR (HCC): ICD-10-CM

## 2023-10-25 DIAGNOSIS — M54.50 CHRONIC MIDLINE LOW BACK PAIN WITHOUT SCIATICA: ICD-10-CM

## 2023-10-25 DIAGNOSIS — G89.29 CHRONIC MIDLINE LOW BACK PAIN WITHOUT SCIATICA: ICD-10-CM

## 2023-10-25 DIAGNOSIS — M25.50 POLYARTHRALGIA: ICD-10-CM

## 2023-10-25 DIAGNOSIS — M05.772 RHEUMATOID ARTHRITIS INVOLVING BOTH FEET WITH POSITIVE RHEUMATOID FACTOR (HCC): ICD-10-CM

## 2023-10-25 LAB
ALBUMIN SERPL-MCNC: 3.6 G/DL (ref 3.4–5)
ALBUMIN/GLOB SERPL: 1 {RATIO} (ref 1–2)
ALP LIVER SERPL-CCNC: 40 U/L
ALT SERPL-CCNC: 21 U/L
ANION GAP SERPL CALC-SCNC: 2 MMOL/L (ref 0–18)
AST SERPL-CCNC: 18 U/L (ref 15–37)
BASOPHILS # BLD AUTO: 0.06 X10(3) UL (ref 0–0.2)
BASOPHILS NFR BLD AUTO: 1.3 %
BILIRUB SERPL-MCNC: 0.5 MG/DL (ref 0.1–2)
BUN BLD-MCNC: 15 MG/DL (ref 7–18)
CALCIUM BLD-MCNC: 8.8 MG/DL (ref 8.5–10.1)
CHLORIDE SERPL-SCNC: 108 MMOL/L (ref 98–112)
CO2 SERPL-SCNC: 30 MMOL/L (ref 21–32)
CREAT BLD-MCNC: 0.94 MG/DL
CRP SERPL-MCNC: <0.29 MG/DL (ref ?–0.3)
EGFRCR SERPLBLD CKD-EPI 2021: 71 ML/MIN/1.73M2 (ref 60–?)
EOSINOPHIL # BLD AUTO: 0.32 X10(3) UL (ref 0–0.7)
EOSINOPHIL NFR BLD AUTO: 7.1 %
ERYTHROCYTE [DISTWIDTH] IN BLOOD BY AUTOMATED COUNT: 12.6 %
ERYTHROCYTE [SEDIMENTATION RATE] IN BLOOD: 10 MM/HR
FASTING STATUS PATIENT QL REPORTED: YES
GLOBULIN PLAS-MCNC: 3.7 G/DL (ref 2.8–4.4)
GLUCOSE BLD-MCNC: 98 MG/DL (ref 70–99)
HCT VFR BLD AUTO: 40.3 %
HGB BLD-MCNC: 13.2 G/DL
IMM GRANULOCYTES # BLD AUTO: 0 X10(3) UL (ref 0–1)
IMM GRANULOCYTES NFR BLD: 0 %
LYMPHOCYTES # BLD AUTO: 1.29 X10(3) UL (ref 1–4)
LYMPHOCYTES NFR BLD AUTO: 28.5 %
MCH RBC QN AUTO: 31 PG (ref 26–34)
MCHC RBC AUTO-ENTMCNC: 32.8 G/DL (ref 31–37)
MCV RBC AUTO: 94.6 FL
MONOCYTES # BLD AUTO: 0.5 X10(3) UL (ref 0.1–1)
MONOCYTES NFR BLD AUTO: 11 %
NEUTROPHILS # BLD AUTO: 2.36 X10 (3) UL (ref 1.5–7.7)
NEUTROPHILS # BLD AUTO: 2.36 X10(3) UL (ref 1.5–7.7)
NEUTROPHILS NFR BLD AUTO: 52.1 %
OSMOLALITY SERPL CALC.SUM OF ELEC: 291 MOSM/KG (ref 275–295)
PLATELET # BLD AUTO: 171 10(3)UL (ref 150–450)
POTASSIUM SERPL-SCNC: 4.3 MMOL/L (ref 3.5–5.1)
PROT SERPL-MCNC: 7.3 G/DL (ref 6.4–8.2)
RBC # BLD AUTO: 4.26 X10(6)UL
SODIUM SERPL-SCNC: 140 MMOL/L (ref 136–145)
WBC # BLD AUTO: 4.5 X10(3) UL (ref 4–11)

## 2023-10-25 PROCEDURE — 80053 COMPREHEN METABOLIC PANEL: CPT

## 2023-10-25 PROCEDURE — 85025 COMPLETE CBC W/AUTO DIFF WBC: CPT

## 2023-10-25 PROCEDURE — 36415 COLL VENOUS BLD VENIPUNCTURE: CPT

## 2023-10-25 PROCEDURE — 86140 C-REACTIVE PROTEIN: CPT

## 2023-10-25 PROCEDURE — 85652 RBC SED RATE AUTOMATED: CPT

## 2023-11-05 NOTE — PROGRESS NOTES
This visit is conducted using Telemedicine with live, interactive video and audio. Telehealth outside of [address] Verbal Consent   I conducted a telehealth visit with Gissell Barajas today, 11/07/23, which was completed using two-way, real-time interactive audio and video communication. This has been done in good winter to provide continuity of care in the best interest of the provider-patient relationship, due to the COVID -19 public health crisis/national emergency where restrictions of face-to-face office visits are ongoing. Every conscious effort was taken to allow for sufficient and adequate time to complete the visit. The patient was made aware of the limitations of the telehealth visit, including treatment limitations as no physical exam could be performed. The patient was advised to call 911 or to go to the ER in case there was an emergency. The patient was also advised of the potential privacy & security concerns related to the telehealth platform. The patient was made aware of where to find [facility] notice of privacy practices, telehealth consent form and other related consent forms and documents. which are located on the [facility] website. The patient verbally agreed to telehealth consent form, related consents and the risks discussed. Lastly, the patient confirmed that they were in [place]. Included in this visit, time may have been spent reviewing labs, medications, radiology tests and decision making. Appropriate medical decision-making and tests are ordered as detailed in the plan of care above. Coding/billing information is submitted for this visit based on complexity of care and/or time spent for the visit.     HPI:   Gissell Barajas is a 66year old female here to follow up on right hand     Pt fell in August while walking the dog   Hip is better   Hand has not gotten better and it has made weight lifting more difficult     Normal x-ray in September     Current Outpatient Medications   Medication Sig Dispense Refill    hydroxychloroquine 200 MG Oral Tab Take 1 tablet (200 mg total) by mouth 2 (two) times daily. 180 tablet 0    tamoxifen 20 MG Oral Tab Take 1 tablet (20 mg total) by mouth daily. 90 tablet 3    cetirizine 10 MG Oral Tab Take 1 tablet (10 mg total) by mouth daily. 90 tablet 0    Misc Natural Products (TURMERIC CURCUMIN) Oral Cap Take 1,500 mg by mouth daily. Omega-3 Fatty Acids (FISH OIL OR) Take 1,280 mg by mouth daily. Calcium Carbonate-Vitamin D (CALCIUM 600 + D OR) Take 1 tablet by mouth daily. MONTELUKAST 10 MG Oral Tab TAKE 1 TABLET NIGHTLY 90 tablet 3    Multiple Vitamin (MULTIVITAMIN ADULT OR) Take by mouth. fluticasone propionate 50 MCG/ACT Nasal Suspension 2 sprays by Nasal route nightly. 48 mL 1    B Complex Vitamins (VITAMIN B COMPLEX 100 IJ)       Ergocalciferol (VITAMIN D OR) Take 2,000 Units by mouth daily. Probiotic Product (PROBIOTIC DAILY OR) Take by mouth. Past Medical History:   Diagnosis Date    Allergic rhinitis due to pollen 09/28/2011    Skin tests 2013- allergic to mold, ragweed, grass, cats, pollen    Asthma     allergy/exercise-induced    Bell's palsy 1999    Breast CA ([facility] [address] 07/2020    55yrs old (invasive ductal).     Breast cancer in female [facility]) 07/15/2020    Right Breast IDC    Ductal carcinoma in situ of breast 08/2020    right    Easy bruising     Extrinsic asthma, unspecified     Migraines     Rheumatoid arthritis ([facility] [address] 01/2023    Schmorl's node 2005    neck    Visual impairment     READERS      Past Surgical History:   Procedure Laterality Date    BREAST BIOPSY Right 07/15/2020    CHEMOTHERAPY      chemo after lumpectomy 10/2020-12/2020    COLONOSCOPY  05/12/2021    DENTAL SURGERY PROCEDURE  11/1/08    Implants    LUMPECTOMY RIGHT  08/13/2020    OTHER      vein treatment    RADIATION RIGHT      4wks Dec 2020-Jan 2021    TONSILLECTOMY        Family History   Problem Relation Age of Onset    DCIS Self 48 Breast Cancer Self 48        IDC    Colon Cancer Mother 68    Other (Malt Lymphoma (Cancer)) Mother 68        oral cancer;undergoing treatment    Hypertension Father     Prostate Cancer Father 79    Thyroid disease Sister     No Known Problems Brother     No Known Problems Brother     Allergies Daughter     Anxiety Son     Depression Son     No Known Problems Son     Diabetes Maternal Grandmother     Colon Cancer Maternal Grandmother         Liver and Pancreatic CA, unknown age for dx    Diabetes Maternal Grandfather     Other (Multiple Myeloma) Maternal Aunt         unknown age at dx    Breast Cancer Paternal Aunt 62    Other (Bone Cancer) Paternal Aunt 80    Other (Brain Cancer) Paternal Aunt 59    Other (Lung Cancer) Paternal Aunt 61      Social History:   Social History     Socioeconomic History    Marital status:     Number of children: 3   Occupational History    Occupation: Homemaker    Occupation: marketing    Occupation: development   Tobacco Use    Smoking status: Never    Smokeless tobacco: Never   Vaping Use    Vaping Use: Never used   Substance and Sexual Activity    Alcohol use: Yes     Alcohol/week: 1.0 - 4.0 standard drink of alcohol     Types: 1 - 4 Glasses of wine per week     Comment: Socially    Drug use: No   Other Topics Concern    Caffeine Concern Yes     Comment: 1 a day    Exercise Yes     Comment: 2-3 times/week    Seat Belt Yes     Occ:  : . Children: 3  Working for [employer]    Exercise: three times per week.   Diet: watches calories closely     REVIEW OF SYSTEMS:   GENERAL: feels well otherwise  SKIN: denies any unusual skin lesions  EYES:denies blurred vision or double vision  HEENT: denies nasal congestion, sinus pain or ST  LUNGS: denies shortness of breath with exertion  CARDIOVASCULAR: denies chest pain on exertion  GI: denies abdominal pain,denies heartburn  : denies dysuria, vaginal discharge or itching  MUSCULOSKELETAL: denies back pain  NEURO: denies headaches  PSYCHE: denies depression or anxiety  HEMATOLOGIC: denies hx of anemia  ENDOCRINE: denies thyroid history  ALL/ASTHMA: asthma    EXAM:   alert, appears stated age and cooperative, Normocephalic, without obvious abnormality, atraumatic, lips, mucosa, and tongue normal; teeth and gums normal, Speaking in full sentences comfortably, Normal work of breathing, Skin color, texture, turgor normal. No rashes or lesions and age appropriate, normal, logical connections, person, place and time/date and no suicidal ideation    On video + finkelstein test     ASSESSMENT AND PLAN:   Prashant Martinez is a 66year old female who presents with     1. De Quervain's tenosynovitis, right    - Ortho Referral - In Network      Questions answered and patient indicates understanding of these issues and agrees to the plan. Follow up in 1 mo or sooner if needed.

## 2023-11-07 ENCOUNTER — TELEMEDICINE (OUTPATIENT)
Dept: FAMILY MEDICINE CLINIC | Facility: CLINIC | Age: 58
End: 2023-11-07
Payer: COMMERCIAL

## 2023-11-07 DIAGNOSIS — M65.4 DE QUERVAIN'S TENOSYNOVITIS, RIGHT: Primary | ICD-10-CM

## 2023-11-07 PROCEDURE — 99213 OFFICE O/P EST LOW 20 MIN: CPT | Performed by: FAMILY MEDICINE

## 2023-11-09 ENCOUNTER — OFFICE VISIT (OUTPATIENT)
Dept: HEMATOLOGY/ONCOLOGY | Facility: HOSPITAL | Age: 58
End: 2023-11-09
Attending: SPECIALIST
Payer: COMMERCIAL

## 2023-11-09 VITALS
HEART RATE: 67 BPM | RESPIRATION RATE: 16 BRPM | DIASTOLIC BLOOD PRESSURE: 82 MMHG | WEIGHT: 170.19 LBS | SYSTOLIC BLOOD PRESSURE: 133 MMHG | TEMPERATURE: 98 F | HEIGHT: 69.49 IN | BODY MASS INDEX: 24.64 KG/M2

## 2023-11-09 DIAGNOSIS — Z17.0 MALIGNANT NEOPLASM OF UPPER-INNER QUADRANT OF RIGHT BREAST IN FEMALE, ESTROGEN RECEPTOR POSITIVE: Primary | ICD-10-CM

## 2023-11-09 DIAGNOSIS — C50.211 MALIGNANT NEOPLASM OF UPPER-INNER QUADRANT OF RIGHT BREAST IN FEMALE, ESTROGEN RECEPTOR POSITIVE: Primary | ICD-10-CM

## 2023-11-09 DIAGNOSIS — Z79.810 LONG-TERM CURRENT USE OF TAMOXIFEN: ICD-10-CM

## 2023-11-09 PROCEDURE — 99214 OFFICE O/P EST MOD 30 MIN: CPT | Performed by: SPECIALIST

## 2023-11-09 NOTE — PROGRESS NOTES
THE [facility] Progress Note      Patient Name: Nata Sawyer   YOB: 1965  Medical Record Number: [id number]  Attending Physician: Tamy Maria M.D. The [id number] makes medical notes like these available to patients in the interest of transparency. Please be advised this is a medical document. Medical documents are intended to carry relevant information, facts as evident, and the clinical opinion of the practitioner. The medical note is intended as peer to peer communication and may appear blunt or direct. It is written in medical language and may contain abbreviations or verbiage that are unfamiliar. Date of Visit: 11/9/2023       Chief Complaint  Invasive ductal carcinoma, right breast.     Oncologic History  Mathew Ayala is a 66year old female who on 08/13/2020 underwent right breast lumpectomy with sentinel lymph node biopsy for a grade 3, 0.4 cm infiltrating ductal carcinoma with 1 negative lymph node (0/1). Immunohistochemical studies were as follows: estrogen receptor 90% (strong), progesterone receptor 90% (strong), Her2 0, Ki67 25%. She was staged as [id number]. OncotypeDX testing showed a recurrence score of 28. She received four cycles of adjuvant chemotherapy with docetaxel and cyclophosphamide. In 12/2020 she began adjuvant endocrine therapy with tamoxifen. From 12/29/2020 to 01/26/2021 she received adjuvant radiation therapy. No known pathogenic variants were found in the following 9 genes: KARTHIK, BRCA1, BRCA2, CDH1, CHEK2, PALB2, PTEN, STK11, and TP53. History of Present Illness  Patient returns for follow up. She denies any self palpated breast masses or nipple discharge. She denies any vaginal bleeding. No new respiratory symptoms. Performance Status   Karnofsky 100% - Normal, no complaints.     Past Medical History (historical data, reviewed by physician)   Invasive ductal carcinoma, right breast (as above); rheumatoid arthritis; asthma; Bell's palsy. Past Surgical History (historical data, reviewed by physician)   Right breast lumpectomy with sentinel lymph node biopsy; dental implants; tonsillectomy. Family History (historical data, reviewed by physician)   Ms. Jacque Costa has a daughter and two sons. Ms. Jacque Costa has two younger brothers and one younger sister. Ms. Marilu Omalley sister had a partial thyroidectomy at age 23 for a benign thyroid nodule. Ms. Marilu Omalley mother was diagnosed with a right sided colon cancer in 2019. Immunohistochemistry for mismatch repair proteins showed loss of MLH1, MSH6, and PMS2. MSH2 expression was intact. Her reported medical history is also notable for MALT lymphoma of the oral cavity in . Genetic testing on Ms. Mtz mother showed no germline pathogenic variants in MLH1, MSH2 (including EPCAM deletion/duplication analysis), MSH6, or PMS2. Two somatic MSH6 pathogenic variants were detected in the colon tumor. The observed loss of MLH1 and PMS2 is due to somatic MLH1 promoter hypermethylation, the loss of MSH6 is attributed to somatic mutations in MSH6. Ms. Marilu Omalley mother had two brothers and two sisters. Ms. Marilu Omalley maternal aunt  at age 80 from myeloma. Ms. Marilu Omalley maternal grandmother  at age 76 from colorectal cancer. Ms. Marilu Omalley father had prostate cancer, Byron score 3+4, at age 80 and melanoma skin cancer. He has two sisters and four brothers. Ms. Marilu Omalley paternal aunt had breast cancer at age 62. Two of Ms. Mtz paternal uncles had melanoma. Another paternal aunt had lung cancer in her late 46s. Social History (historical data, reviewed by physician)   Denies tobacco use. Current Medications   hydroxychloroquine 200 MG Oral Tab Take 1 tablet (200 mg total) by mouth 2 (two) times daily. (Patient taking differently: Take 1 tablet (200 mg total) by mouth 2 (two) times daily.  Alternating 200 mg with 400 mg daily. ) 180 tablet 0    tamoxifen 20 MG Oral Tab Take 1 tablet (20 mg total) by mouth daily. 90 tablet 3    cetirizine 10 MG Oral Tab Take 1 tablet (10 mg total) by mouth daily. 90 tablet 0    Misc Natural Products (TURMERIC CURCUMIN) Oral Cap Take 1,500 mg by mouth daily. Omega-3 Fatty Acids (FISH OIL OR) Take 1,280 mg by mouth daily. Calcium Carbonate-Vitamin D (CALCIUM 600 + D OR) Take 1 tablet by mouth daily. MONTELUKAST 10 MG Oral Tab TAKE 1 TABLET NIGHTLY 90 tablet 3    Multiple Vitamin (MULTIVITAMIN ADULT OR) Take by mouth. fluticasone propionate 50 MCG/ACT Nasal Suspension 2 sprays by Nasal route nightly. 48 mL 1    B Complex Vitamins (VITAMIN B COMPLEX 100 IJ)       Ergocalciferol (VITAMIN D OR) Take 2,000 Units by mouth daily. Probiotic Product (PROBIOTIC DAILY OR) Take by mouth. Allergies   Ms. Agrawal is allergic to seasonal.    Vital Signs   [facility] 07/22/2020     Physical Examination   Constitutional      Well developed, well nourished. Appears close to chronological age. No apparent distress. Head                   Normocephalic and atraumatic. Eyes                   Conjunctiva clear; sclera anicteric. ENMT                 External nose normal; external ears normal.  Neck                   Supple, without masses. Hematologic/Lymphatic No cervical, supraclavicular, axillary lymphadenopathy. Respiratory          Normal effort; no respiratory distress; lungs clear to auscultation bilaterally. Cardiovascular     Regular rate and rhythm. Abdomen            Non-tender; non-distended; no masses,no fluid wave; no hepatosplenomegaly. Extremities          No lower extremity edema. Neurologic           Motor and sensory grossly intact. Psychiatric          Mood and affect appropriate. Laboratory   No results found for this or any previous visit (from the past 48 hour(s)).     Radiology   [facility]  Department of Radiology  [address], [address]         [phone]      Name: Hebert Goins Dr: Prisca Durán MD  : 1965    Age/Sex: 66year old Female  Pt. Phone: [phone]  Exam Date: 10/10/2023  Procedure: MRI BREAST (W+WO) W/CAD BILAT (CPT=77049)   -----------------------------------------------------------------------------------------------------------------------------------------------                  Prisca Durán MD  [address]      Interpretation   PROCEDURE:  MRI BREAST (W+WO) W/CAD BILAT (CPT=77049)     COMPARISON:  MR CEDRIC, MRI BREAST (W+WO) W/CAD BILAT (CPT=77049), 2020, 4:24 PM.  MR CEDRIC, MRI BREAST (W+WO) W/CAD BILAT (CPT=77049), 2022, 6:26 PM.  [facility] , , [facility] RATNA 2D+3D DIAGNOSTIC CHRISTOPHER  BILAT (FRL=[id number]), 3/03/2023, 8:36   AM.     INDICATIONS:  Z17.0 Malignant neoplasm of upper-inner quadrant of right breast in female, estrogen receptor positive  C50.211 Malignant neoplasm of upper-inner quadrant of r*     82-year-old woman with history of breast malignancy presents for high risk screening breast MRI. She has a history of right breast malignancy status post lumpectomy in 2020 at the age of 50, also treated with radiation therapy and chemotherapy. She has dense breast tissue. She complains of discomfort in her right axillary region. TECHNIQUE:  The breasts were imaged using dynamic intravenous gadolinium infusion, thin sections, and a dedicated breast coil. Bilateral pre-contrast axial 2D/3D T2 weighted and 3D T1 weighted VIBRANT sequences were obtained with and without fat   suppression. Post contrast VIBRANT 3D T1 weighted images after IV gadolinium were obtained. 1.6 mm slice reconstruction of 3D data sets with additional maximum intensity projects, subtraction, graphic, and kinetic analysis were performed from source   data.      PATIENT STATED HISTORY:(As transcribed by Technologist) Breast cancer surveillance, history of Lumpectomu 7/2020. CONTRAST USED:  16 mL of Dotarem     FINDINGS:    There is heterogeneous fibroglandular tissue. There is minimal background parenchymal enhancement. There are subtle postsurgical/post lumpectomy changes in the inner posterior right breast with no associated suspicious enhancement. There are no suspicious areas of enhancement in either breast.     A capsule has been placed at the site of the patient's discomfort, overlying the right axilla. There is no underlying abnormality. There is no internal mammary axillary adenopathy on either side.                   =====  CONCLUSION:    Benign post lumpectomy changes involving the inner posterior right breast.     No MRI findings suspicious for malignancy in either breast.     No MRI findings to explain the etiology of the patient's right axillary discomfort. Clinical follow up and management recommended. The patient will be due for annual bilateral mammogram in March 2024. Given the patient's history of breast malignancy and dense breast tissue, recommend consideration of continued annual supplemental screening breast MRI, due in 1 year. BI-RADS CATEGORY:  DIAGNOSTIC CATEGORY 2--BENIGN FINDING:       RECOMMENDATIONS:    ANNUAL SCREENING MAMMMOGRAM IN SIX MONTHS. BILATERAL BREASTS       SCREENING MRI OF THE BREAST IN ONE YEAR       PLEASE NOTE:  A NORMAL MRI DOES NOT EXCLUDE THE POSSIBILITY OF BREAST CANCER. A CLINICALLY SUSPICIOUS PALPABLE LUMP SHOULD BE BIOPSIED. CORRELATION WITH CLINICAL EXAM AND OTHER IMAGING STUDIES IS RECOMMENDED. LOCATION:  [facility]        Dictated by (CST): George Wilson MD on 10/11/2023 at 9:24 AM       Finalized by (CST): George Wilson MD on 10/11/2023 at 9:32 AM        Impression and Plan   1. Invasive ductal carcinoma, right breast: Patient was staged as [id number]. Her disease was estrogen receptor and progesterone receptor positive and Her2 negative. Her OncotypeDX score was 28. She underwent lumpectomy with sentinel lymph node biopsy, adjuvant chemotherapy and adjuvant radiation therapy. In 12/2020 she began adjuvant endocrine therapy with tamoxifen. Breast imaging has been ordered by Dr. Neri Rodríguez for 03/2024. Planned Follow Up   Patient will return for follow up in 6 months. Electronically signed by:    Clemente Lei M.D.   [facility] Medical Director of Oncology Services  [facility]

## 2023-11-09 NOTE — PROGRESS NOTES
Patient is here for MD f/u for Breast cancer. Patient is on Tamoxifen daily. MRI of the breast completed on 10/10. Last DEXA scan was in June. Feeling well.        Education Record    Learner:  Patient    Disease / Diagnosis:  Breast cancer     Barriers / Limitations:  None   Comments:    Method:  Discussion   Comments:    General Topics:  Plan of care reviewed   Comments:    Outcome:  Shows understanding   Comments:

## 2023-11-10 ENCOUNTER — TELEPHONE (OUTPATIENT)
Dept: ORTHOPEDICS CLINIC | Facility: CLINIC | Age: 58
End: 2023-11-10

## 2023-11-10 NOTE — TELEPHONE ENCOUNTER
Patient has for RT hand \"De Quervain's tenosynovitis\". Patient had xr on 9/28 and can be viewed in Epic. Please review imaging, and advise if further imaging is required. If, so please place RX accordingly.     Future Appointments   Date Time Provider Catalina Chris   12/14/2023  8:30 AM Alysia Fernandez,  [facility] [address]   12/21/2023 10:40 AM MARINE Pyle [facility]

## 2023-11-16 ENCOUNTER — OFFICE VISIT (OUTPATIENT)
Dept: ORTHOPEDICS CLINIC | Facility: CLINIC | Age: 58
End: 2023-11-16
Payer: COMMERCIAL

## 2023-11-16 VITALS — BODY MASS INDEX: 25.18 KG/M2 | WEIGHT: 170 LBS | HEIGHT: 69 IN

## 2023-11-16 DIAGNOSIS — M18.12 PRIMARY OSTEOARTHRITIS OF FIRST CARPOMETACARPAL JOINT OF LEFT HAND: Primary | ICD-10-CM

## 2023-11-16 RX ORDER — BETAMETHASONE SODIUM PHOSPHATE AND BETAMETHASONE ACETATE 3; 3 MG/ML; MG/ML
6 INJECTION, SUSPENSION INTRA-ARTICULAR; INTRALESIONAL; INTRAMUSCULAR; SOFT TISSUE ONCE
Status: COMPLETED | OUTPATIENT
Start: 2023-11-16 | End: 2023-11-16

## 2023-11-16 RX ORDER — MELOXICAM 15 MG/1
15 TABLET ORAL DAILY
Qty: 30 TABLET | Refills: 0 | Status: SHIPPED | OUTPATIENT
Start: 2023-11-16

## 2023-11-16 RX ADMIN — BETAMETHASONE SODIUM PHOSPHATE AND BETAMETHASONE ACETATE 6 MG: 3; 3 INJECTION, SUSPENSION INTRA-ARTICULAR; INTRALESIONAL; INTRAMUSCULAR; SOFT TISSUE at 14:11:00

## 2023-12-13 RX ORDER — MONTELUKAST SODIUM 10 MG/1
10 TABLET ORAL NIGHTLY
Qty: 90 TABLET | Refills: 3 | Status: SHIPPED | OUTPATIENT
Start: 2023-12-13

## 2023-12-14 ENCOUNTER — OFFICE VISIT (OUTPATIENT)
Dept: FAMILY MEDICINE CLINIC | Facility: CLINIC | Age: 58
End: 2023-12-14
Payer: COMMERCIAL

## 2023-12-14 ENCOUNTER — LAB ENCOUNTER (OUTPATIENT)
Dept: LAB | Age: 58
End: 2023-12-14
Attending: FAMILY MEDICINE
Payer: COMMERCIAL

## 2023-12-14 VITALS
WEIGHT: 166 LBS | HEART RATE: 68 BPM | RESPIRATION RATE: 16 BRPM | DIASTOLIC BLOOD PRESSURE: 72 MMHG | SYSTOLIC BLOOD PRESSURE: 106 MMHG | HEIGHT: 69 IN | BODY MASS INDEX: 24.59 KG/M2 | OXYGEN SATURATION: 98 %

## 2023-12-14 DIAGNOSIS — J34.89 SINUS PAIN: ICD-10-CM

## 2023-12-14 DIAGNOSIS — Z00.00 ANNUAL PHYSICAL EXAM: Primary | ICD-10-CM

## 2023-12-14 DIAGNOSIS — Z00.00 ANNUAL PHYSICAL EXAM: ICD-10-CM

## 2023-12-14 LAB
ALBUMIN SERPL-MCNC: 3.8 G/DL (ref 3.4–5)
ALBUMIN/GLOB SERPL: 1 {RATIO} (ref 1–2)
ALP LIVER SERPL-CCNC: 40 U/L
ALT SERPL-CCNC: 20 U/L
ANION GAP SERPL CALC-SCNC: 7 MMOL/L (ref 0–18)
AST SERPL-CCNC: 24 U/L (ref 15–37)
BASOPHILS # BLD AUTO: 0.03 X10(3) UL (ref 0–0.2)
BASOPHILS NFR BLD AUTO: 0.5 %
BILIRUB SERPL-MCNC: 0.3 MG/DL (ref 0.1–2)
BUN BLD-MCNC: 12 MG/DL (ref 9–23)
CALCIUM BLD-MCNC: 9 MG/DL (ref 8.5–10.1)
CHLORIDE SERPL-SCNC: 105 MMOL/L (ref 98–112)
CHOLEST SERPL-MCNC: 151 MG/DL (ref ?–200)
CO2 SERPL-SCNC: 27 MMOL/L (ref 21–32)
CREAT BLD-MCNC: 1.02 MG/DL
EGFRCR SERPLBLD CKD-EPI 2021: 65 ML/MIN/1.73M2 (ref 60–?)
EOSINOPHIL # BLD AUTO: 0.02 X10(3) UL (ref 0–0.7)
EOSINOPHIL NFR BLD AUTO: 0.4 %
ERYTHROCYTE [DISTWIDTH] IN BLOOD BY AUTOMATED COUNT: 12.5 %
EST. AVERAGE GLUCOSE BLD GHB EST-MCNC: 108 MG/DL (ref 68–126)
FASTING PATIENT LIPID ANSWER: YES
FASTING STATUS PATIENT QL REPORTED: YES
FLUAV + FLUBV RNA SPEC NAA+PROBE: NEGATIVE
FLUAV + FLUBV RNA SPEC NAA+PROBE: NEGATIVE
GLOBULIN PLAS-MCNC: 3.7 G/DL (ref 2.8–4.4)
GLUCOSE BLD-MCNC: 106 MG/DL (ref 70–99)
HBA1C MFR BLD: 5.4 % (ref ?–5.7)
HCT VFR BLD AUTO: 42.3 %
HDLC SERPL-MCNC: 96 MG/DL (ref 40–59)
HGB BLD-MCNC: 13.6 G/DL
IMM GRANULOCYTES # BLD AUTO: 0.01 X10(3) UL (ref 0–1)
IMM GRANULOCYTES NFR BLD: 0.2 %
LDLC SERPL CALC-MCNC: 41 MG/DL (ref ?–100)
LYMPHOCYTES # BLD AUTO: 1.03 X10(3) UL (ref 1–4)
LYMPHOCYTES NFR BLD AUTO: 18 %
MCH RBC QN AUTO: 30.7 PG (ref 26–34)
MCHC RBC AUTO-ENTMCNC: 32.2 G/DL (ref 31–37)
MCV RBC AUTO: 95.5 FL
MONOCYTES # BLD AUTO: 0.92 X10(3) UL (ref 0.1–1)
MONOCYTES NFR BLD AUTO: 16.1 %
NEUTROPHILS # BLD AUTO: 3.7 X10 (3) UL (ref 1.5–7.7)
NEUTROPHILS # BLD AUTO: 3.7 X10(3) UL (ref 1.5–7.7)
NEUTROPHILS NFR BLD AUTO: 64.8 %
NONHDLC SERPL-MCNC: 55 MG/DL (ref ?–130)
OSMOLALITY SERPL CALC.SUM OF ELEC: 288 MOSM/KG (ref 275–295)
PLATELET # BLD AUTO: 144 10(3)UL (ref 150–450)
POTASSIUM SERPL-SCNC: 3.7 MMOL/L (ref 3.5–5.1)
PROT SERPL-MCNC: 7.5 G/DL (ref 6.4–8.2)
RBC # BLD AUTO: 4.43 X10(6)UL
RSV RNA SPEC NAA+PROBE: NEGATIVE
SARS-COV-2 RNA RESP QL NAA+PROBE: DETECTED
SODIUM SERPL-SCNC: 139 MMOL/L (ref 136–145)
T4 FREE SERPL-MCNC: 1 NG/DL (ref 0.8–1.7)
TRIGL SERPL-MCNC: 69 MG/DL (ref 30–149)
TSI SER-ACNC: 0.73 MIU/ML (ref 0.36–3.74)
VIT B12 SERPL-MCNC: 636 PG/ML (ref 193–986)
VIT D+METAB SERPL-MCNC: 57.3 NG/ML (ref 30–100)
VLDLC SERPL CALC-MCNC: 10 MG/DL (ref 0–30)
WBC # BLD AUTO: 5.7 X10(3) UL (ref 4–11)

## 2023-12-14 PROCEDURE — 80061 LIPID PANEL: CPT

## 2023-12-14 PROCEDURE — 83036 HEMOGLOBIN GLYCOSYLATED A1C: CPT

## 2023-12-14 PROCEDURE — 3074F SYST BP LT 130 MM HG: CPT | Performed by: FAMILY MEDICINE

## 2023-12-14 PROCEDURE — 82306 VITAMIN D 25 HYDROXY: CPT

## 2023-12-14 PROCEDURE — 3008F BODY MASS INDEX DOCD: CPT | Performed by: FAMILY MEDICINE

## 2023-12-14 PROCEDURE — 80053 COMPREHEN METABOLIC PANEL: CPT

## 2023-12-14 PROCEDURE — 85025 COMPLETE CBC W/AUTO DIFF WBC: CPT

## 2023-12-14 PROCEDURE — 3078F DIAST BP <80 MM HG: CPT | Performed by: FAMILY MEDICINE

## 2023-12-14 PROCEDURE — 84439 ASSAY OF FREE THYROXINE: CPT

## 2023-12-14 PROCEDURE — 99396 PREV VISIT EST AGE 40-64: CPT | Performed by: FAMILY MEDICINE

## 2023-12-14 PROCEDURE — 82607 VITAMIN B-12: CPT

## 2023-12-14 PROCEDURE — 84443 ASSAY THYROID STIM HORMONE: CPT

## 2023-12-14 PROCEDURE — 36415 COLL VENOUS BLD VENIPUNCTURE: CPT

## 2023-12-14 PROCEDURE — 0241U SARS-COV-2/FLU A AND B/RSV BY PCR (GENEXPERT): CPT

## 2023-12-14 RX ORDER — AMOXICILLIN AND CLAVULANATE POTASSIUM 875; 125 MG/1; MG/1
1 TABLET, FILM COATED ORAL 2 TIMES DAILY
Qty: 20 TABLET | Refills: 0 | Status: SHIPPED | OUTPATIENT
Start: 2023-12-14 | End: 2023-12-24

## 2024-01-18 ENCOUNTER — TELEPHONE (OUTPATIENT)
Dept: FAMILY MEDICINE CLINIC | Facility: CLINIC | Age: 59
End: 2024-01-18

## 2024-01-18 RX ORDER — ALBUTEROL SULFATE 90 UG/1
2 AEROSOL, METERED RESPIRATORY (INHALATION) EVERY 6 HOURS PRN
Qty: 1 EACH | Refills: 1 | Status: SHIPPED | OUTPATIENT
Start: 2024-01-18

## 2024-02-25 NOTE — LETTER
ASTHMA ACTION PLAN for Swetha Menendez     : 1965     Date: 2018  Provider:  Luis A Garrett DO  Phone for doctor or clinic: [address] [place] [id number] [phone]       ACT Score:  24    You [] Asthma Action Plan reviewed with patient (and caregiver if necessary) on the phone and mailed copy to patient or submitted via [address]      Signatures:  Provider  Ramandeep Jones DO   Patient Caretaker yes

## 2024-02-26 ENCOUNTER — TELEPHONE (OUTPATIENT)
Dept: RHEUMATOLOGY | Facility: CLINIC | Age: 59
End: 2024-02-26

## 2024-02-26 ENCOUNTER — HOSPITAL ENCOUNTER (OUTPATIENT)
Age: 59
Discharge: HOME | End: 2024-02-26
Attending: EMERGENCY MEDICINE
Payer: COMMERCIAL

## 2024-02-26 ENCOUNTER — APPOINTMENT (OUTPATIENT)
Dept: ULTRASOUND IMAGING | Age: 59
End: 2024-02-26
Attending: EMERGENCY MEDICINE
Payer: COMMERCIAL

## 2024-02-26 VITALS
RESPIRATION RATE: 18 BRPM | HEART RATE: 70 BPM | HEIGHT: 70 IN | SYSTOLIC BLOOD PRESSURE: 135 MMHG | WEIGHT: 170 LBS | BODY MASS INDEX: 24.34 KG/M2 | OXYGEN SATURATION: 99 % | TEMPERATURE: 98 F | DIASTOLIC BLOOD PRESSURE: 72 MMHG

## 2024-02-26 DIAGNOSIS — M05.772 RHEUMATOID ARTHRITIS INVOLVING BOTH FEET WITH POSITIVE RHEUMATOID FACTOR (HCC): Primary | ICD-10-CM

## 2024-02-26 DIAGNOSIS — M25.50 POLYARTHRALGIA: ICD-10-CM

## 2024-02-26 DIAGNOSIS — I80.9 THROMBOPHLEBITIS: ICD-10-CM

## 2024-02-26 DIAGNOSIS — M05.771 RHEUMATOID ARTHRITIS INVOLVING BOTH FEET WITH POSITIVE RHEUMATOID FACTOR (HCC): Primary | ICD-10-CM

## 2024-02-26 DIAGNOSIS — I80.02 THROMBOPHLEBITIS OF SUPERFICIAL VEINS OF LEFT LOWER EXTREMITY: Primary | ICD-10-CM

## 2024-02-26 LAB
#MXD IC: 0.6 X10ˆ3/UL (ref 0.1–1)
BUN BLD-MCNC: 16 MG/DL (ref 7–18)
CHLORIDE BLD-SCNC: 104 MMOL/L (ref 98–112)
CO2 BLD-SCNC: 26 MMOL/L (ref 21–32)
CREAT BLD-MCNC: 0.8 MG/DL
EGFRCR SERPLBLD CKD-EPI 2021: 86 ML/MIN/1.73M2 (ref 60–?)
GLUCOSE BLD-MCNC: 100 MG/DL (ref 70–99)
HCT VFR BLD AUTO: 39.2 %
HCT VFR BLD CALC: 41 %
HGB BLD-MCNC: 13.6 G/DL
INR BLDC: 0.9 (ref 0.9–1.1)
ISTAT IONIZED CALCIUM FOR CHEM 8: 1.22 MMOL/L (ref 1.12–1.32)
LYMPHOCYTES # BLD AUTO: 1.4 X10ˆ3/UL (ref 1–4)
LYMPHOCYTES NFR BLD AUTO: 22.5 %
MCH RBC QN AUTO: 31.6 PG (ref 26–34)
MCHC RBC AUTO-ENTMCNC: 34.7 G/DL (ref 31–37)
MCV RBC AUTO: 91 FL (ref 80–100)
MIXED CELL %: 8.6 %
NEUTROPHILS # BLD AUTO: 4.4 X10ˆ3/UL (ref 1.5–7.7)
NEUTROPHILS NFR BLD AUTO: 68.9 %
PLATELET # BLD AUTO: 173 X10ˆ3/UL (ref 150–450)
POTASSIUM BLD-SCNC: 4.2 MMOL/L (ref 3.6–5.1)
RBC # BLD AUTO: 4.31 X10ˆ6/UL
SODIUM BLD-SCNC: 141 MMOL/L (ref 136–145)
WBC # BLD AUTO: 6.4 X10ˆ3/UL (ref 4–11)

## 2024-02-26 PROCEDURE — 93971 EXTREMITY STUDY: CPT | Performed by: EMERGENCY MEDICINE

## 2024-02-26 PROCEDURE — 85025 COMPLETE CBC W/AUTO DIFF WBC: CPT | Performed by: EMERGENCY MEDICINE

## 2024-02-26 PROCEDURE — 85610 PROTHROMBIN TIME: CPT | Performed by: EMERGENCY MEDICINE

## 2024-02-26 PROCEDURE — 99204 OFFICE O/P NEW MOD 45 MIN: CPT

## 2024-02-26 PROCEDURE — 80047 BASIC METABLC PNL IONIZED CA: CPT

## 2024-02-26 PROCEDURE — 99215 OFFICE O/P EST HI 40 MIN: CPT

## 2024-02-26 PROCEDURE — 36415 COLL VENOUS BLD VENIPUNCTURE: CPT

## 2024-02-26 NOTE — ED PROVIDER NOTES
Patient Seen in: [facility]      History     Chief Complaint   Patient presents with    Cellulitis     Stated Complaint: Burn - WRIST AND ANKLE ARE HOT, ANKLE IS SWELLING UP    Subjective:   58-year-old female, remote history of breast cancer, on tamoxifen, presents with redness and pain to the left medial distal leg above the medial malleolus.  Slightly irritated last night but warm and red and more painful after going to the gym today.  He states that she had some wrist pain but that went away and she had a small bruise there thought that her watch is causing some pain.  Thinks might be unrelated to today's complaints.  No falls.  No trauma.  No history of DVT.            Objective:   Past Medical History:   Diagnosis Date    Allergic rhinitis due to pollen 09/28/2011    Skin tests 2013- allergic to mold, ragweed, grass, cats, pollen    Asthma ([facility])     allergy/exercise-induced    Bell's palsy 1999    Breast CA ([facility]) 07/2020    55yrs old (invasive ductal).    Breast cancer in female ([facility]) 07/15/2020    Right Breast IDC    Ductal carcinoma in situ of breast 08/2020    right    Easy bruising     Extrinsic asthma, unspecified     Migraines     Rheumatoid arthritis ([facility]) 01/2023    Schmorl's node 2005    neck    Visual impairment     READERS              Past Surgical History:   Procedure Laterality Date    BREAST BIOPSY Right 07/15/2020    CHEMOTHERAPY      chemo after lumpectomy 10/2020-12/2020    COLONOSCOPY  05/12/2021    DENTAL SURGERY PROCEDURE  11/1/08    Implants    LUMPECTOMY RIGHT  08/13/2020    OTHER      vein treatment    RADIATION RIGHT      4wks Dec 2020-Jan 2021    TONSILLECTOMY                  Social History     Socioeconomic History    Marital status:     Number of children: 3   Occupational History    Occupation: Homemaker    Occupation: marketing    Occupation: development   Tobacco Use    Smoking status: Never    Smokeless tobacco: Never   Vaping Use    Vaping Use: Never  used   Substance and Sexual Activity    Alcohol use: Yes     Alcohol/week: 1.0 - 4.0 standard drink of alcohol     Types: 1 - 4 Glasses of wine per week     Comment: Socially    Drug use: No   Other Topics Concern    Caffeine Concern Yes     Comment: 1 a day    Exercise Yes     Comment: 2-3 times/week    Seat Belt Yes              Review of Systems   Constitutional:  Negative for fever.   Respiratory:  Negative for shortness of breath.    Cardiovascular:  Negative for chest pain.   Gastrointestinal:  Negative for abdominal pain.   Skin:  Positive for rash.   Neurological:  Negative for numbness.   Hematological:  Does not bruise/bleed easily.       Positive for stated complaint: Burn - WRIST AND ANKLE ARE HOT, ANKLE IS SWELLING UP  Other systems are as noted in HPI.  Constitutional and vital signs reviewed.      All other systems reviewed and negative except as noted above.    Physical Exam     ED Triage Vitals [02/26/24 1238]   /72   Pulse 70   Resp 18   Temp 98.4 °F (36.9 °C)   Temp src Temporal   SpO2 99 %   O2 Device None (Room air)       Current:/72   Pulse 70   Temp 98.4 °F (36.9 °C) (Temporal)   Resp 18   Ht 177.8 cm (5' 10\")   Wt 77.1 kg   LMP 07/22/2020   SpO2 99%   BMI 24.39 kg/m²         Physical Exam  Vitals and nursing note reviewed.   Constitutional:       Appearance: She is not toxic-appearing.   HENT:      Head: Normocephalic.   Cardiovascular:      Rate and Rhythm: Normal rate.      Pulses: Normal pulses.   Pulmonary:      Effort: Pulmonary effort is normal. No respiratory distress.      Breath sounds: Normal breath sounds.   Abdominal:      Palpations: Abdomen is soft.   Musculoskeletal:         General: Tenderness present. No deformity.      Cervical back: Neck supple.   Skin:     Findings: Erythema and rash present.   Neurological:      Mental Status: She is alert.      Sensory: No sensory deficit.   Psychiatric:         Mood and Affect: Mood normal.         Behavior: Behavior  normal.         Erythema some slight warmth to the medial distal left leg above the medial malleolus.  There is a tiny palpable cord there I do suspect some superficial thrombophlebitis.  2+ DP and PT pulses.  No calf pain.  The wrist exam is normal.  There is old appearing bruise.  2+ radial pulses.  I think the wrist was unrelated to this    ED Course     Labs Reviewed   POCT ISTAT CHEM8 CARTRIDGE - Abnormal; Notable for the following components:       Result Value    ISTAT Glucose 100 (*)     All other components within normal limits   POCT COAGUCHECK - Normal    Narrative:     INR Therapeutic Interval Group A (2.00-3.00)     Venous Thrombosis, Pulmonary & Systemic Thrombosis, Tissue Heart Valve,         Acute Myocardidal Infarction, Valvular Heart Disease or Atrial Fibrillation     INR Therapeutic Interval Group B (2.50-3.50)        Recurrent Systemic Embolism or Mechanical Prosthetic Valve         POCT CBC                      MDM     US VENOUS DOPPLER LEG LEFT - DIAG IMG (CPT=93971)    Result Date: 2/26/2024  CONCLUSION:  No DVT.   LOCATION:  [id number]    Dictated by (CST): Stromberg, LeRoy, MD on 2/26/2024 at 2:39 PM     Finalized by (CST): Stromberg, LeRoy, MD on 2/26/2024 at 2:40 PM          58-year-old female presents with clinic.  I believe is superficial thrombophlebitis.  Also does not see anything but with a little palpable nodule over superficial veins surrounding erythema to the lateral or venous stasis dermatitis.  Her labs are stable.  No fever.  Distal pulses intact.  Did consider anticoagulation but without any documented obvious evidence of a clot there I believe that full dose aspirin and warm compresses and hematology follow-up is appropriate.  She is in agreement.  Does have RA as well.  She is on tamoxifen for history of breast cancer.  Is not cellulitic.  There is no white count.  No fever.  No trauma.  Discussed everything with her.  She will follow-up with her specialist and hematology as  needed.  She is happy with that plan.  Shared decision making utilized, return precaution provided    Patient was screened and evaluated during this visit.  As the treating physician attending to the patient, I determined within reasonable clinical confidence and prior to discharge, that an emergency medical condition was not or was no longer present.  There was no indication for further evaluation, treatment, or admission on an emergency basis.  Comprehensive verbal and written discharge and follow-up instructions were provided to help prevent relapse or worsening.  Patient was instructed to follow-up with their primary care provider for further evaluation and treatment, return immediately to ER for worsening, concerning, new, or changing/persisting symptoms. I discussed the case with the patient and they had no questions, complaints, or concerns.  Patient was comfortable going home.     Per the discharge paperwork, patients are encouraged to and given instructions on how to [facility], where they have access to their records, including any/all incidental findings.     This note was prepared using Dragon Medical voice recognition dictation software. As a result errors may occur. When identified these errors have been corrected. While every attempt is made to correct errors during dictation discrepancies may still exist    Note to patient: The 21st Century Cures Act makes medical notes like these available to patients in the interest of transparency. However, this is a medical document intended as peer to peer communication. It is written in medical language and may contain abbreviations or verbiage that are unfamiliar. It may appear blunt or direct. Medical documents are intended to carry relevant information, facts as evident, and the clinical opinion of the practitioner.                                        Medical Decision Making      Disposition and Plan     Clinical Impression:  1. Thrombophlebitis  of superficial veins of left lower extremity         Disposition:  Discharge  2/26/2024  3:27 pm    Follow-up:  Mariana Fabian DO  [address] [facility] [address]  [facility] [id number] [phone]          Alen Lee MD  [address]  [facility]  [facility] [address]  [phone]      As needed          Medications Prescribed:  Current Discharge Medication List

## 2024-02-26 NOTE — ED INITIAL ASSESSMENT (HPI)
Patient states today she noticed the left ankle was warm/hot to touch, red, tightness, and some pain. States on 2/24/24 she noticed a burning sensation to the skin on her left wrist that she still feels a hypersensitive area. Denies any injuries/traumas, no history of DVT/PE, or other symptoms.

## 2024-02-27 ENCOUNTER — NURSE ONLY (OUTPATIENT)
Dept: HEMATOLOGY/ONCOLOGY | Facility: HOSPITAL | Age: 59
End: 2024-02-27

## 2024-02-27 ENCOUNTER — PATIENT MESSAGE (OUTPATIENT)
Dept: HEMATOLOGY/ONCOLOGY | Facility: HOSPITAL | Age: 59
End: 2024-02-27

## 2024-02-27 NOTE — TELEPHONE ENCOUNTER
Pt went to the [facility] urgent care and after having an ultrasound done, was diagnosed with superficial blood clot. Pt was told to take ajay aspirin (regular not baby) and pt wasn't sure if she should take two or one. She is on hydroxychloroquine and didn't know if having the blood clot would affect her treatment plan.

## 2024-03-01 ENCOUNTER — OFFICE VISIT (OUTPATIENT)
Dept: FAMILY MEDICINE CLINIC | Facility: CLINIC | Age: 59
End: 2024-03-01
Payer: COMMERCIAL

## 2024-03-01 VITALS
RESPIRATION RATE: 16 BRPM | SYSTOLIC BLOOD PRESSURE: 116 MMHG | OXYGEN SATURATION: 98 % | HEART RATE: 70 BPM | BODY MASS INDEX: 25.77 KG/M2 | WEIGHT: 174 LBS | HEIGHT: 69 IN | DIASTOLIC BLOOD PRESSURE: 72 MMHG

## 2024-03-01 DIAGNOSIS — I80.02 THROMBOPHLEBITIS OF SUPERFICIAL VEINS OF LEFT LOWER EXTREMITY: Primary | ICD-10-CM

## 2024-03-01 PROCEDURE — 99213 OFFICE O/P EST LOW 20 MIN: CPT | Performed by: FAMILY MEDICINE

## 2024-03-01 RX ORDER — ASPIRIN 81 MG/1
81 TABLET ORAL DAILY
COMMUNITY

## 2024-03-01 NOTE — PROGRESS NOTES
HPI:   Yahaira Agrawal is a 58 year old female who presents     2/26 UC  S/p ultrasound   Superficial thrombophlebitis   Discussed med SE   Patient denies chest pain, shortness of breath, dizziness, and lightheadedness. No exertional symptoms.  On asa   Already improved   Side sleeper   No prolonged immobility       Current Outpatient Medications   Medication Sig Dispense Refill    aspirin 81 MG Oral Tab EC Take 1 tablet (81 mg total) by mouth daily.      albuterol 108 (90 Base) MCG/ACT Inhalation Aero Soln Inhale 2 puffs into the lungs every 6 (six) hours as needed for Wheezing. 1 each 1    montelukast 10 MG Oral Tab Take 1 tablet (10 mg total) by mouth nightly. 90 tablet 3    Meloxicam 15 MG Oral Tab Take 1 tablet (15 mg total) by mouth daily. Take once daily for 4 weeks 30 tablet 0    hydroxychloroquine 200 MG Oral Tab Take 1 tablet (200 mg total) by mouth 2 (two) times daily. 180 tablet 0    tamoxifen 20 MG Oral Tab Take 1 tablet (20 mg total) by mouth daily. 90 tablet 3    cetirizine 10 MG Oral Tab Take 1 tablet (10 mg total) by mouth daily. 90 tablet 0    Misc Natural Products (TURMERIC CURCUMIN) Oral Cap Take 1,500 mg by mouth daily.      Omega-3 Fatty Acids (FISH OIL OR) Take 1,280 mg by mouth daily.      Calcium Carbonate-Vitamin D (CALCIUM 600 + D OR) Take 1 tablet by mouth daily.      Multiple Vitamin (MULTIVITAMIN ADULT OR) Take by mouth.      fluticasone propionate 50 MCG/ACT Nasal Suspension 2 sprays by Nasal route nightly. 48 mL 1    B Complex Vitamins (VITAMIN B COMPLEX 100 IJ)       Ergocalciferol (VITAMIN D OR) Take 2,000 Units by mouth daily.      Probiotic Product (PROBIOTIC DAILY OR) Take by mouth.        Past Medical History:   Diagnosis Date    Allergic rhinitis due to pollen 09/28/2011    Skin tests 2013- allergic to mold, ragweed, grass, cats, pollen    Asthma ([facility])     allergy/exercise-induced    Bell's palsy 1999    Breast CA ([facility]) 07/2020    55yrs old (invasive ductal).    Breast  cancer in female (HCC) 07/15/2020    Right Breast IDC    Ductal carcinoma in situ of breast 08/2020    right    Easy bruising     Extrinsic asthma, unspecified     Migraines     Rheumatoid arthritis (HCC) 01/2023    Schmorl's node 2005    neck    Visual impairment     READERS      Past Surgical History:   Procedure Laterality Date    BREAST BIOPSY Right 07/15/2020    CHEMOTHERAPY      chemo after lumpectomy 10/2020-12/2020    COLONOSCOPY  05/12/2021    DENTAL SURGERY PROCEDURE  11/1/08    Implants    LUMPECTOMY RIGHT  08/13/2020    OTHER      vein treatment    RADIATION RIGHT      4wks Dec 2020-Jan 2021    TONSILLECTOMY        Family History   Problem Relation Age of Onset    DCIS Self 53    Breast Cancer Self 55        IDC    Colon Cancer Mother 77    Other (Malt Lymphoma (Cancer)) Mother 73        oral cancer;undergoing treatment    Hypertension Father     Prostate Cancer Father 70    Thyroid disease Sister     No Known Problems Brother     No Known Problems Brother     Allergies Daughter     Anxiety Son     Depression Son     No Known Problems Son     Diabetes Maternal Grandmother     Colon Cancer Maternal Grandmother         Liver and Pancreatic CA, unknown age for dx    Diabetes Maternal Grandfather     Other (Multiple Myeloma) Maternal Aunt         unknown age at dx    Breast Cancer Paternal Aunt 57    Other (Bone Cancer) Paternal Aunt 83    Other (Brain Cancer) Paternal Aunt 64    Other (Lung Cancer) Paternal Aunt 59      Social History:   Social History     Socioeconomic History    Marital status:     Number of children: 3   Occupational History    Occupation: Homemaker    Occupation: marketing    Occupation: development   Tobacco Use    Smoking status: Never    Smokeless tobacco: Never   Vaping Use    Vaping Use: Never used   Substance and Sexual Activity    Alcohol use: Yes     Alcohol/week: 1.0 - 4.0 standard drink of alcohol     Types: 1 - 4 Glasses of wine per week     Comment: Socially    Drug  use: No   Other Topics Concern    Caffeine Concern Yes     Comment: 1 a day    Exercise Yes     Comment: 2-3 times/week    Seat Belt Yes     Occ:  : . 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  Diet: watches calories closely     REVIEW OF SYSTEMS:   GENERAL: feels well otherwise  SKIN: denies any unusual skin lesions  EYES:denies blurred vision or double vision  HEENT: denies nasal congestion, sinus pain or ST  LUNGS: denies shortness of breath with exertion  CARDIOVASCULAR: denies chest pain on exertion  GI: denies abdominal pain,denies heartburn  : denies dysuria, vaginal discharge or itching  MUSCULOSKELETAL: denies back pain  NEURO: denies headaches  PSYCHE: denies depression or anxiety  HEMATOLOGIC: denies hx of anemia  ENDOCRINE: denies thyroid history  ALL/ASTHMA: asthma    EXAM:   /72   Pulse 70   Resp 16   Ht 5' 9\" (1.753 m)   Wt 174 lb (78.9 kg)   LMP 07/22/2020   SpO2 98%   BMI 25.70 kg/m²   Body mass index is 25.7 kg/m².   GENERAL: alert and oriented X 3, well developed, well nourished,in no apparent distress   EXTREMITIES: no cyanosis, clubbing or edema  NEURO: cranial nerves are intact,motor and sensory are grossly intact  Left ankle: reduced medial swelling and redness compared to photos / 2+ pulses     ASSESSMENT AND PLAN:   Yahaira Agrawal is a 58 year old female who presents with     1. Thrombophlebitis of superficial veins of left lower extremity  Monitor / on asa for 2 weeks   Notify heme/ onc    Questions answered and patient indicates understanding of these issues and agrees to the plan.  Follow up in december or sooner if needed.

## 2024-03-04 ENCOUNTER — HOSPITAL ENCOUNTER (OUTPATIENT)
Dept: MAMMOGRAPHY | Facility: HOSPITAL | Age: 59
Discharge: HOME | End: 2024-03-04
Attending: SURGERY
Payer: COMMERCIAL

## 2024-03-04 DIAGNOSIS — C50.211 MALIGNANT NEOPLASM OF UPPER-INNER QUADRANT OF RIGHT BREAST IN FEMALE, ESTROGEN RECEPTOR POSITIVE (HCC): ICD-10-CM

## 2024-03-04 DIAGNOSIS — Z17.0 MALIGNANT NEOPLASM OF UPPER-INNER QUADRANT OF RIGHT BREAST IN FEMALE, ESTROGEN RECEPTOR POSITIVE (HCC): ICD-10-CM

## 2024-03-04 PROCEDURE — 76641 ULTRASOUND BREAST COMPLETE: CPT | Performed by: SURGERY

## 2024-03-04 PROCEDURE — 77062 BREAST TOMOSYNTHESIS BI: CPT | Performed by: SURGERY

## 2024-03-04 PROCEDURE — 77066 DX MAMMO INCL CAD BI: CPT | Performed by: SURGERY

## 2024-05-09 ENCOUNTER — OFFICE VISIT (OUTPATIENT)
Dept: HEMATOLOGY/ONCOLOGY | Facility: HOSPITAL | Age: 59
End: 2024-05-09
Attending: SPECIALIST
Payer: COMMERCIAL

## 2024-05-09 VITALS
RESPIRATION RATE: 18 BRPM | OXYGEN SATURATION: 99 % | HEIGHT: 69.49 IN | BODY MASS INDEX: 24.99 KG/M2 | HEART RATE: 62 BPM | DIASTOLIC BLOOD PRESSURE: 71 MMHG | TEMPERATURE: 98 F | SYSTOLIC BLOOD PRESSURE: 113 MMHG | WEIGHT: 172.63 LBS

## 2024-05-09 DIAGNOSIS — C50.211 MALIGNANT NEOPLASM OF UPPER-INNER QUADRANT OF RIGHT BREAST IN FEMALE, ESTROGEN RECEPTOR POSITIVE (HCC): Primary | ICD-10-CM

## 2024-05-09 DIAGNOSIS — Z79.810 LONG-TERM CURRENT USE OF TAMOXIFEN: ICD-10-CM

## 2024-05-09 DIAGNOSIS — Z17.0 MALIGNANT NEOPLASM OF UPPER-INNER QUADRANT OF RIGHT BREAST IN FEMALE, ESTROGEN RECEPTOR POSITIVE (HCC): Primary | ICD-10-CM

## 2024-05-09 PROCEDURE — 99214 OFFICE O/P EST MOD 30 MIN: CPT | Performed by: SPECIALIST

## 2024-05-09 NOTE — PROGRESS NOTES
[facility] Progress Note      Patient Name: Yahaira Agrawal   YOB: 1965  Medical Record Number: [id number]  Attending Physician: Farhan Joaquin M.D.     The 21st Century Cures Act makes medical notes like these available to patients in the interest of transparency. Please be advised this is a medical document. Medical documents are intended to carry relevant information, facts as evident, and the clinical opinion of the practitioner. The medical note is intended as peer to peer communication and may appear blunt or direct. It is written in medical language and may contain abbreviations or verbiage that are unfamiliar.     Date of Visit: 5/9/2024       Chief Complaint  Invasive ductal carcinoma, right breast.     Oncologic History  Yahaira Agrawal is a 58 year old female who on 08/13/2020 underwent right breast lumpectomy with sentinel lymph node biopsy for a grade 3, 0.4 cm infiltrating ductal carcinoma with 1 negative lymph node (0/1). Immunohistochemical studies were as follows: estrogen receptor 90% (strong), progesterone receptor 90% (strong), Her2 0, Ki67 25%. She was staged as [id number].    OncotypeDX testing showed a recurrence score of 28. She received four cycles of adjuvant chemotherapy with docetaxel and cyclophosphamide. In 12/2020 she began adjuvant endocrine therapy with tamoxifen. From 12/29/2020 to 01/26/2021 she received adjuvant radiation therapy.     No known pathogenic variants were found in the following 9 genes: KARTHIK, BRCA1, BRCA2, CDH1, CHEK2, PALB2, PTEN, STK11, and TP53.      History of Present Illness  Patient returns for follow up. She denies any self palpated breast masses or nipple discharge. She denies any vaginal bleeding. No new respiratory symptoms.     Patient was diagnosed with superficial thrombophlebitis in the distal left leg in 02/2024. She was treated with aspirin daily. Her symptoms have resolved completely.     Performance Status    Karnofsky 100% - Normal, no complaints.    Past Medical History (historical data, reviewed by physician)   Invasive ductal carcinoma, right breast (as above); rheumatoid arthritis; asthma; Bell's palsy.     Past Surgical History (historical data, reviewed by physician)   Right breast lumpectomy with sentinel lymph node biopsy; dental implants; tonsillectomy.    Family History (historical data, reviewed by physician)   Ms. Agrawal has a daughter and two sons. Ms. Agrawal has two younger brothers and one younger sister. Ms. Agrawal’s sister had a partial thyroidectomy at age 19 for a benign thyroid nodule.       Ms. Agrawal’s mother was diagnosed with a right sided colon cancer in 2019. Immunohistochemistry for mismatch repair proteins showed loss of MLH1, MSH6, and PMS2. MSH2 expression was intact.  Her reported medical history is also notable for MALT lymphoma of the oral cavity in .   Genetic testing on Ms. Agrawal’s mother showed no germline pathogenic variants in MLH1, MSH2 (including EPCAM deletion/duplication analysis), MSH6, or PMS2.  Two somatic MSH6 pathogenic variants were detected in the colon tumor.  The observed loss of MLH1 and PMS2 is due to somatic MLH1 promoter hypermethylation, the loss of MSH6 is attributed to somatic mutations in MSH6.  Ms. Agrawal’s mother had two brothers and two sisters.  Ms. Agrawal’s maternal aunt  at age 83 from myeloma.  Ms. Agrawal’s maternal grandmother  at age 74 from colorectal cancer.        Ms. Agrawal’s father had prostate cancer, Janine score 3+4, at age 81 and melanoma skin cancer.  He has two sisters and four brothers.   Ms. Agrawal’s paternal aunt had breast cancer at age 57.  Two of Ms. Agrawal’s paternal uncles had melanoma.  Another paternal aunt had lung cancer in her late 50s.     Social History (historical data, reviewed by physician)   Denies tobacco use.    Current Medications   albuterol 108 (90 Base) MCG/ACT  Inhalation Aero Soln Inhale 2 puffs into the lungs every 6 (six) hours as needed for Wheezing. (Patient taking differently: Inhale 2 puffs into the lungs as needed for Wheezing.) 1 each 1    montelukast 10 MG Oral Tab Take 1 tablet (10 mg total) by mouth nightly. 90 tablet 3    hydroxychloroquine 200 MG Oral Tab Take 1 tablet (200 mg total) by mouth 2 (two) times daily. (Patient taking differently: Take 2 tablets (400 mg total) by mouth daily.) 180 tablet 0    tamoxifen 20 MG Oral Tab Take 1 tablet (20 mg total) by mouth daily. 90 tablet 3    cetirizine 10 MG Oral Tab Take 1 tablet (10 mg total) by mouth daily. 90 tablet 0    Misc Natural Products (TURMERIC CURCUMIN) Oral Cap Take 1,500 mg by mouth daily.      Omega-3 Fatty Acids (FISH OIL OR) Take 1,280 mg by mouth daily.      Calcium Carbonate-Vitamin D (CALCIUM 600 + D OR) Take 1 tablet by mouth daily.      Multiple Vitamin (MULTIVITAMIN ADULT OR) Take by mouth.      fluticasone propionate 50 MCG/ACT Nasal Suspension 2 sprays by Nasal route nightly. (Patient taking differently: 2 sprays by Nasal route as needed.) 48 mL 1    B Complex Vitamins (VITAMIN B COMPLEX 100 IJ)       Ergocalciferol (VITAMIN D OR) Take 2,000 Units by mouth daily.      Probiotic Product (PROBIOTIC DAILY OR) Take by mouth.       Allergies   Ms. Agrawal is allergic to seasonal.    Vital Signs   /71 (BP Location: Right arm, Patient Position: Sitting, Cuff Size: adult)   Pulse 62   Temp 97.5 °F (36.4 °C) (Temporal)   Resp 18   Ht 1.765 m (5' 9.49\")   Wt 78.3 kg (172 lb 9.6 oz)   LMP 07/22/2020   SpO2 99%   BMI 25.13 kg/m²     Physical Examination   Constitutional      Well developed, well nourished. Appears close to chronological age. No apparent distress.   Head                   Normocephalic and atraumatic.  Eyes                   Conjunctiva clear; sclera anicteric.  ENMT                 External nose normal; external ears normal.  Neck                   Supple, without  masses.  Hematologic/Lymphatic No cervical, supraclavicular, axillary lymphadenopathy.    Respiratory          Normal effort; no respiratory distress; lungs clear to auscultation bilaterally.  Cardiovascular     Regular rate and rhythm.  Abdomen            Non-tender; non-distended; no masses,no fluid wave; no hepatosplenomegaly.  Extremities          No lower extremity edema.  Neurologic           Motor and sensory grossly intact.  Psychiatric          Mood and affect appropriate.    Laboratory   No results found for this or any previous visit (from the past 48 hour(s)).    Radiology   [facility]  Department of Radiology  [address] [place] [address] [address]  [place] [address] [phone]      Name: Yahaira Agrawal Dr: Brenda Hill MD  : 1965    Age/Sex: 58 year old Female  Pt. Phone: [phone]  Exam Date: 2024  Procedure: [facility] RATNA 2D+3D DIAGNOSTIC CHRISTOPHER  BILAT (XMQ=[id number])   -----------------------------------------------------------------------------------------------------------------------------------------------                  Brenda Hill MD  [address] [place] [address]      Interpretation   PROCEDURE:  [facility] RATNA 2D+3D DIAGNOSTIC CHRISTOPHER  BILAT (CPT=[id number])     COMPARISON:  [facility] , [facility] RATNA 2D+3D DIAGNOSTIC CHRISTOPHER  BILAT (UHZ=[id number]), 3/03/2023, 8:36 AM.     INDICATIONS:  Z17.0 Malignant neoplasm of upper-inner quadrant of right breast in female, estrogen receptor positive (HCC) C50.211 Malignant neoplasm of upper-inner quadrant*     VIEWS OBTAINED:  Diagnostic views of both breasts were obtained.  Bilateral whole breast ultrasound was performed.   Standard 2D and additional multiplane thin sections of the breast were obtained for the purpose of Tomosynthesis evaluation.  The images were reconstructed and reviewed on the dedicated Tomosynthesis workstation.     BREAST COMPOSITION:   Extremely dense, which  lowers the sensitivity of mammography.     FINDINGS:  Parenchymal pattern is unchanged.  There are no grouped microcalcifications, spiculated masses or areas of architectural distortion.     Bilateral whole breast ultrasound was performed due to history of breast cancer and dense breasts.  There are no unique ultrasound findings.                   =====  CONCLUSION:       BI-RADS CATEGORY:    DIAGNOSTIC CATEGORY 1--NEGATIVE ASSESSMENT.       RECOMMENDATIONS:    ROUTINE MAMMOGRAM AND CLINICAL EVALUATION IN 12 MONTHS.                A letter explaining the results in lay terms has been sent to the patient.  This exam was evaluated with a computer-aided device.  This patient's information has been entered into a reminder system with a target due date for the next mammogram.        LOCATION:  [facility]        Dictated by (CST): Vishnu Santiago MD on 3/04/2024 at 9:49 AM       Finalized by (CST): Vishnu Santiago MD on 3/04/2024 at 9:50 AM        Impression and Plan   1.   Invasive ductal carcinoma, right breast: Patient was staged as [id number]. Her disease was estrogen receptor and progesterone receptor positive and Her2 negative. Her OncotypeDX score was 28.  She underwent lumpectomy with sentinel lymph node biopsy, adjuvant chemotherapy and adjuvant radiation therapy. In 12/2020 she began adjuvant endocrine therapy with tamoxifen.           Continue tamoxifen without modification.           Breast imaging is managed by Dr. Hill.     Planned Follow Up   Patient will return for follow up in 6 months.    Electronically signed by:    Farhan Joaquin M.D.  System Medical Director of Oncology Services  [facility]

## 2024-05-09 NOTE — PROGRESS NOTES
Patient is here for MD f/u for Breast cancer. Patient is on Tamoxifen daily. She was diagnosed with a thrombophlebitis in left ankle in February. Taking an Aspirin 81 mg daily. Last mammogram was in March. Feeling well.     Education Record    Learner:  Patient    Disease / Diagnosis:  Breast cancer     Barriers / Limitations:  None   Comments:    Method:  Discussion   Comments:    General Topics:  Plan of care reviewed   Comments:    Outcome:  Shows understanding   Comments:

## 2024-06-13 ENCOUNTER — OFFICE VISIT (OUTPATIENT)
Dept: SURGERY | Facility: CLINIC | Age: 59
End: 2024-06-13
Payer: COMMERCIAL

## 2024-06-13 VITALS
BODY MASS INDEX: 25 KG/M2 | RESPIRATION RATE: 18 BRPM | OXYGEN SATURATION: 95 % | HEART RATE: 79 BPM | SYSTOLIC BLOOD PRESSURE: 97 MMHG | DIASTOLIC BLOOD PRESSURE: 51 MMHG | WEIGHT: 172.63 LBS | TEMPERATURE: 98 F

## 2024-06-13 DIAGNOSIS — Z17.0 MALIGNANT NEOPLASM OF UPPER-INNER QUADRANT OF RIGHT BREAST IN FEMALE, ESTROGEN RECEPTOR POSITIVE (HCC): Primary | ICD-10-CM

## 2024-06-13 DIAGNOSIS — C50.211 MALIGNANT NEOPLASM OF UPPER-INNER QUADRANT OF RIGHT BREAST IN FEMALE, ESTROGEN RECEPTOR POSITIVE (HCC): Primary | ICD-10-CM

## 2024-06-16 DIAGNOSIS — M05.771 RHEUMATOID ARTHRITIS INVOLVING BOTH FEET WITH POSITIVE RHEUMATOID FACTOR (HCC): ICD-10-CM

## 2024-06-16 DIAGNOSIS — M05.772 RHEUMATOID ARTHRITIS INVOLVING BOTH FEET WITH POSITIVE RHEUMATOID FACTOR (HCC): ICD-10-CM

## 2024-06-17 RX ORDER — HYDROXYCHLOROQUINE SULFATE 200 MG/1
200 TABLET, FILM COATED ORAL 2 TIMES DAILY
Qty: 180 TABLET | Refills: 3 | Status: SHIPPED | OUTPATIENT
Start: 2024-06-17

## 2024-06-17 RX ORDER — HYDROXYCHLOROQUINE SULFATE 200 MG/1
200 TABLET, FILM COATED ORAL 2 TIMES DAILY
Qty: 28 TABLET | Refills: 0 | Status: SHIPPED | OUTPATIENT
Start: 2024-06-17

## 2024-06-17 NOTE — TELEPHONE ENCOUNTER
Patient comment: Hello - I am out of this medication. Is it possible to send the refill to [facility] but also to my local [facility] so that I have enough until the rest arrives in the mail? Thank you! My cell is [phone] .     Pended two week supply to local pharmacy
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
## 2024-06-17 NOTE — TELEPHONE ENCOUNTER
Future Appointments   Date Time Provider Department Center   7/22/2024  8:30 AM Jennifer Palacios DO [id number] [address]   11/7/2024 11:30 AM Farhan Joaquin MD  [facility]   1/7/2025  9:00 AM Ju Clarke MD [facility] [address]   4/4/2025  1:00 PM Brenda Hill MD [facility]     Last office visit: 9/26/2024    Last fill: 9/26/2023 180 tab, 0 refills

## 2024-07-22 ENCOUNTER — OFFICE VISIT (OUTPATIENT)
Dept: RHEUMATOLOGY | Facility: CLINIC | Age: 59
End: 2024-07-22
Payer: COMMERCIAL

## 2024-07-22 VITALS
WEIGHT: 175 LBS | SYSTOLIC BLOOD PRESSURE: 128 MMHG | HEIGHT: 69 IN | HEART RATE: 70 BPM | BODY MASS INDEX: 25.92 KG/M2 | TEMPERATURE: 98 F | OXYGEN SATURATION: 96 % | RESPIRATION RATE: 16 BRPM | DIASTOLIC BLOOD PRESSURE: 68 MMHG

## 2024-07-22 DIAGNOSIS — M79.644 CHRONIC PAIN OF RIGHT THUMB: ICD-10-CM

## 2024-07-22 DIAGNOSIS — M05.772 RHEUMATOID ARTHRITIS INVOLVING BOTH FEET WITH POSITIVE RHEUMATOID FACTOR (HCC): Primary | ICD-10-CM

## 2024-07-22 DIAGNOSIS — M25.50 POLYARTHRALGIA: ICD-10-CM

## 2024-07-22 DIAGNOSIS — G89.29 CHRONIC MIDLINE LOW BACK PAIN WITHOUT SCIATICA: ICD-10-CM

## 2024-07-22 DIAGNOSIS — M25.551 BILATERAL HIP PAIN: ICD-10-CM

## 2024-07-22 DIAGNOSIS — M54.50 CHRONIC MIDLINE LOW BACK PAIN WITHOUT SCIATICA: ICD-10-CM

## 2024-07-22 DIAGNOSIS — G89.29 CHRONIC PAIN OF RIGHT THUMB: ICD-10-CM

## 2024-07-22 DIAGNOSIS — M25.552 BILATERAL HIP PAIN: ICD-10-CM

## 2024-07-22 DIAGNOSIS — M79.671 BILATERAL FOOT PAIN: ICD-10-CM

## 2024-07-22 DIAGNOSIS — M05.771 RHEUMATOID ARTHRITIS INVOLVING BOTH FEET WITH POSITIVE RHEUMATOID FACTOR (HCC): Primary | ICD-10-CM

## 2024-07-22 DIAGNOSIS — M79.672 BILATERAL FOOT PAIN: ICD-10-CM

## 2024-07-22 DIAGNOSIS — Z85.3 HISTORY OF BREAST CANCER: ICD-10-CM

## 2024-07-22 PROCEDURE — 99214 OFFICE O/P EST MOD 30 MIN: CPT | Performed by: INTERNAL MEDICINE

## 2024-07-22 RX ORDER — MELOXICAM 7.5 MG/1
7.5 TABLET ORAL 2 TIMES DAILY PRN
Qty: 180 TABLET | Refills: 0 | Status: SHIPPED | OUTPATIENT
Start: 2024-07-22

## 2024-07-22 RX ORDER — TAMOXIFEN CITRATE 20 MG/1
20 TABLET ORAL DAILY
Qty: 90 TABLET | Refills: 3 | Status: SHIPPED | OUTPATIENT
Start: 2024-07-22

## 2024-07-22 NOTE — PROGRESS NOTES
RHEUMATOLOGY FOLLOW UP   Date of visit: 07/22/2024  ?  Chief Complaint   Patient presents with    Rheumatoid Arthritis     10 month f/u. Feeling good. Some foot pain when waking up. Hip and lower back pain off and on. Joint pain manageable. Stiffness when sitting for longer. Converted rapid score of 1.0     ?  ASSESSMENT, DISCUSSION & PLAN   Assessment:  1. Rheumatoid arthritis involving both feet with positive rheumatoid factor (HCC)    2. Polyarthralgia    3. History of breast cancer    4. Bilateral hip pain    5. Chronic pain of right thumb    6. Bilateral foot pain    7. Chronic midline low back pain without sciatica        Discussion:  Ms. Yahaira Agrawal is a 58 yo woman previously healthy who was recently diagnosed with breast cancer (2020) s/p lumpectomy, radiation and chemotherapy (s/p TC and now on tamoxifen). She presents for evaluation of worsened bilateral foot pain and then hand and hip pain. xrays have been consistent with osteoarthritis without signs of erosions or inflammation. However, she was found to have high titer RF positivity (negative CCP and normal inflammatory markers). MRI imaging was not previously consistent with RA but did show some tenosynovitis.   At a prior visit, decision was made to start plaquenil and monitor symptoms. Seems like she is doing better overall in terms of her pain.   She has had some worsened joint pain but seems more related to osteoarthritis rather than active RA. Also struggling with weight loss, has plans to be seen at [facility]. Recommended she look into diets around balancing her hormones and decreasing her cortisol.   Some of her symptoms are also likely tamoxifen related.   She will try some other natural remedies for the dry scalp and dry mouth   She had what sounds like a superficial thrombophlebitis. Labs ordered that she was instructed are pending.     -- get labs done that were previously ordered in Feb  -- continue plaquenil (alternating 200mg with  400mg every other day)  -- remember to get annual eye exams to monitor for toxicity from plaquenil   -- okay to take meloxicam twice daily as needed, always with food   -- natural remedies for the dry scalp and dry mouth   -- follow up in 6 months or sooner as needed      Patient verbalized understanding of above instructions. Multiple questions answered today.     Code selection for this visit was based on time spent (38min) on date of service in preparing to see the patient, obtaining and/or reviewing separately obtained history, performing a medically appropriate examination, counseling and educating the patient/family/caregiver, ordering medications or testing, referring and communicating with other healthcare providers, documenting clinical information in the E HR, independently interpreting results and communicating results to the patient/family/caregiver and care coordination with the patient's other providers.    ?  Plan:  Diagnoses and all orders for this visit:    Rheumatoid arthritis involving both feet with positive rheumatoid factor (HCC)    Polyarthralgia    History of breast cancer    Bilateral hip pain  -     Meloxicam 7.5 MG Oral Tab; Take 1 tablet (7.5 mg total) by mouth 2 (two) times daily as needed for Pain.    Chronic pain of right thumb  -     Meloxicam 7.5 MG Oral Tab; Take 1 tablet (7.5 mg total) by mouth 2 (two) times daily as needed for Pain.    Bilateral foot pain  -     Meloxicam 7.5 MG Oral Tab; Take 1 tablet (7.5 mg total) by mouth 2 (two) times daily as needed for Pain.    Chronic midline low back pain without sciatica  -     Meloxicam 7.5 MG Oral Tab; Take 1 tablet (7.5 mg total) by mouth 2 (two) times daily as needed for Pain.            Return in about 6 months (around 1/22/2025).  ?  HPI   Yahaira Agrawal is a 59 year old female with the following active problems who was seen initially due to joint pain and abnormal labs. She had RF positivity and was started on plaquenil.  Presents for follow up today.     Since her last visit, she has been doing okay.  States around September, suffered a fall- injured her right thumb and right hip. Was waiting to see a provider hoping that pain would subside. Was seen by PA and ruled out fracture. Given meloxicam and states she felt great on the medication. Thinks only took for about month. Also got CMC injection   Only a slight right thumb pain now.     In terms of RA, no recent flares.  Has some continued feet pain in the mornings as well as some b/l hip pain and lower back pain.   Feels some worsened symptoms after seated for extended period of time, feels worsened LBP and some hip pain. Denies groin pain. Minimal outside of hip pain.  Still exercising regularly and walking daily.   Previously did PT but felt like it made her pain worse.   Still with achilles tendon pain, somewhat better when wearing specific tennis shoes. Feels worsened with certain shoes- even worsens hip pain.   Was seen by podiatry previously, recommended orthotics but insurance did not cover it.     Slight morning stiffness in the feet/hips, improved by the time she gets down the stairs, about 5 minutes. And then goes to gym right away.   Denies any other significant joint pain or swelling.   Has been tolerating plaquenil 200mg BID. Denies obvious side effects from the plaquenil.  Did have to get wedding ring re-sized.   Still suffering from weight gain. Thinks related tamoxifen. Continues following with oncology.   + some mild dry mouth, always drinking water. Denies recurrent cavities.     Denies dry eyes.  Denies skin rashes but itching base of scalp. Denies flaking. Trying different shampoos with some relief.   Denies oral or nasal ulcers.     Found out that her grandmother and aunt (both maternal) had fibromyalgia  Mother also required immunotherapy for colon and lymphoma but tolerated well.     HPI from initial consultation  referred for rheumatologic evaluation due to  joint pain and rheumatoid factor positivity.     Had been fairly healthy for several years, until finding breast lump in July 2020- found to be breast cancer. Underwent lumpectomy as well as chemotherapy (taxotere and cyclophosphamide- completed fall 2020) and radiation (finished in jan 2021). Continues to take tamoxifen.     Also found to have elevated blood sugar.  Stopped using her inhaler (inhaled steroid) for asthma, was using pulmicort regularly, when stopped, blood sugars improved.     When she was getting back to the gym and exercising more regularly, noticed new foot pain (bilaterally bottom). Feels came out of the blue and was mostly in the mornings.   Then started to notice difficulty getting her wedding ring over her knuckles.   Then started to get bilateral hip pain that would wake from sleep at night.   Had right rib pain, thought related to radiation induced costochondritis   + itching over the back the head as well. Never hx of dandruff or psoriasis     At this time, pain in the feet and hips.   Can get a discomfort while lifting in the wrists and palmar aspect of the MCPs. Has tried ace bandage over the wrists.     + morning stiffness, mostly in the bottom of the feet, about 5-10 minutes.   + hx of iron deficiency as a teenager but nothing really since then. Never required blood transfusions.   + constant thirst but denies dry mouth   + possible spooning of the thumb nail   + hx of chronic low back pain, feels a tightness there. Present in the mornings, while driving and towards the end of the day. Not sure if the back or the hips that would cause her to wake up at night.   + night sweats ?taxomifen   + easy bruising, chronic and unchanged.   + chronic sinus infections. Denies epistaxis.    Hx of one miscarriage, early 1st trimester. 3 other pregnancies, relatively normal   Hx of varicose veins s/p sclerotherapy recently b/l   Hx of intermittent neck pain previously and dx with Schmorl's nodes      Denies neuropathy from chemotherapy.   Denies new skin nodule formation.  The patient denies hair loss (except after chemo), oral or nasal ulcers, photosensitive rash, elevated or scarring rashes, Raynaud's phenomenon, prior renal or liver disease, or history of seizures.  No history of prior blood clot in the legs or lungs, strokes or ischemic phenomenon.  Denies tightening of the skin, nonhealing ulcers on the fingertips, skin calcifications, trouble swallowing, or severe acid reflux.  The patient denies any history of uveitis, crampy abdominal pain, constipation, diarrhea, bloody stools, nodular painful shin bruises, Achilles heel pain or symptoms of enthesitis, psoriatic lesions, pitting of the nails, or history of dactylitis.  There are no symptoms of severe dry eyes, dry mouth, recurrent cavities, or swelling of the cheeks or under the jawbone.  No fevers, chills, lymphadenopathy, or unexpected weight loss  Denies chronic cough or hemoptysis.     Family hx:   No family hx of autoimmune disease.     Past Medical History:  Past Medical History:    Allergic rhinitis due to pollen    Skin tests 2013- allergic to mold, ragweed, grass, cats, pollen    Asthma (HCC)    allergy/exercise-induced    Bell's palsy    Breast CA ([facility])    55yrs old (invasive ductal).    Breast cancer in female (HCC)    Right Breast IDC    Ductal carcinoma in situ of breast    right    Easy bruising    Extrinsic asthma, unspecified    Migraines    Rheumatoid arthritis (HCC)    Schmorl's node    neck    Visual impairment    READERS     Past Surgical History:  Past Surgical History:   Procedure Laterality Date    Breast biopsy Right 07/15/2020    Chemotherapy      chemo after lumpectomy 10/2020-12/2020    Colonoscopy  05/12/2021    Dental surgery procedure  11/1/08    Implants    Lumpectomy right  08/13/2020    Other      vein treatment    Radiation right      4wks Dec 2020-Jan 2021    Tonsillectomy       Family History:  Family History    Problem Relation Age of Onset    DCIS Self 53    Breast Cancer Self 55        IDC    Colon Cancer Mother 77    Other (Malt Lymphoma (Cancer)) Mother 73        oral cancer;undergoing treatment    Hypertension Father     Prostate Cancer Father 70    Thyroid disease Sister     No Known Problems Brother     No Known Problems Brother     Allergies Daughter     Anxiety Son     Depression Son     No Known Problems Son     Diabetes Maternal Grandmother     Colon Cancer Maternal Grandmother         Liver and Pancreatic CA, unknown age for dx    Diabetes Maternal Grandfather     Other (Multiple Myeloma) Maternal Aunt         unknown age at dx    Breast Cancer Paternal Aunt 57    Other (Bone Cancer) Paternal Aunt 83    Other (Brain Cancer) Paternal Aunt 64    Other (Lung Cancer) Paternal Aunt 59     Social History:  Social History     Socioeconomic History    Marital status:     Number of children: 3   Occupational History    Occupation: Homemaker    Occupation: marketing    Occupation: development   Tobacco Use    Smoking status: Never    Smokeless tobacco: Never   Vaping Use    Vaping status: Never Used   Substance and Sexual Activity    Alcohol use: Yes     Alcohol/week: 1.0 - 4.0 standard drink of alcohol     Types: 1 - 4 Glasses of wine per week     Comment: Socially    Drug use: No   Other Topics Concern    Caffeine Concern Yes     Comment: 1 a day    Exercise Yes     Comment: 2-3 times/week    Seat Belt Yes     Social Determinants of Health      [facility]    Social Connections    [facility]    Housing Stability     Medications:  Outpatient Medications Marked as Taking for the 7/22/24 encounter (Office Visit) with Jennifer Palacios DO   Medication Sig Dispense Refill    Meloxicam 7.5 MG Oral Tab Take 1 tablet (7.5 mg total) by mouth 2 (two) times daily as needed for Pain. 180 tablet 0     HYDROXYCHLOROQUINE 200 MG Oral Tab TAKE 1 TABLET TWICE A  tablet 3    albuterol 108 (90 Base) MCG/ACT Inhalation Aero Soln Inhale 2 puffs into the lungs every 6 (six) hours as needed for Wheezing. (Patient taking differently: Inhale 2 puffs into the lungs as needed for Wheezing.) 1 each 1    montelukast 10 MG Oral Tab Take 1 tablet (10 mg total) by mouth nightly. 90 tablet 3    [DISCONTINUED] tamoxifen 20 MG Oral Tab Take 1 tablet (20 mg total) by mouth daily. 90 tablet 3    cetirizine 10 MG Oral Tab Take 1 tablet (10 mg total) by mouth daily. 90 tablet 0    Misc Natural Products (TURMERIC CURCUMIN) Oral Cap Take 1,500 mg by mouth daily.      Omega-3 Fatty Acids (FISH OIL OR) Take 1,280 mg by mouth daily.      Calcium Carbonate-Vitamin D (CALCIUM 600 + D OR) Take 1 tablet by mouth daily.      Multiple Vitamin (MULTIVITAMIN ADULT OR) Take by mouth.      fluticasone propionate 50 MCG/ACT Nasal Suspension 2 sprays by Nasal route nightly. (Patient taking differently: 2 sprays by Nasal route as needed.) 48 mL 1    B Complex Vitamins (VITAMIN B COMPLEX 100 IJ)       Ergocalciferol (VITAMIN D OR) Take 2,000 Units by mouth daily.      Probiotic Product (PROBIOTIC DAILY OR) Take by mouth.       Modified Medications    Modified Medication Previous Medication    TAMOXIFEN 20 MG ORAL TAB tamoxifen 20 MG Oral Tab       TAKE 1 TABLET DAILY    Take 1 tablet (20 mg total) by mouth daily.     Medications Discontinued During This Encounter   Medication Reason    hydroxychloroquine 200 MG Oral Tab      ?  Allergies:  Allergies   Allergen Reactions    Seasonal Runny nose and Coughing     ?  REVIEW OF SYSTEMS   ?  Review of Systems   Constitutional:  Positive for malaise/fatigue (stable). Negative for chills and fever.   Eyes:  Negative for blurred vision, pain and redness.   Respiratory:  Negative for cough, hemoptysis and shortness of breath.    Cardiovascular:  Negative for chest pain, palpitations and leg swelling.    Gastrointestinal:  Negative for abdominal pain, blood in stool, constipation, diarrhea, heartburn and nausea.   Genitourinary:  Positive for frequency. Negative for dysuria, hematuria and urgency.   Musculoskeletal:  Positive for back pain and joint pain. Negative for myalgias and neck pain.   Skin:  Negative for itching and rash.   Neurological:  Negative for dizziness, tingling, seizures, weakness and headaches.   Endo/Heme/Allergies:  Positive for environmental allergies. Bruises/bleeds easily.   Psychiatric/Behavioral:  Negative for depression. The patient is not nervous/anxious and does not have insomnia.      PHYSICAL EXAM   Today's Vitals:  Temperature Blood Pressure Heart Rate Resp Rate SpO2   Temp: 98.1 °F (36.7 °C) BP: 128/68 Pulse: 70 Resp: 16 SpO2: 96 %   ?  Current Weight Height BMI BSA Pain   Wt Readings from Last 1 Encounters:   07/22/24 175 lb (79.4 kg)    Height: 5' 9\" (175.3 cm) Body mass index is 25.84 kg/m². Body surface area is 1.95 meters squared.         Physical Exam  Vitals and nursing note reviewed.   Constitutional:       General: She is not in acute distress.     Appearance: Normal appearance. She is well-developed. She is not diaphoretic.   HENT:      Head: Normocephalic.   Eyes:      General: No scleral icterus.     Extraocular Movements: Extraocular movements intact.      Conjunctiva/sclera: Conjunctivae normal.   Neck:      Vascular: No JVD.      Trachea: No tracheal deviation.   Cardiovascular:      Rate and Rhythm: Normal rate and regular rhythm.      Heart sounds: Normal heart sounds. No murmur heard.  Pulmonary:      Effort: Pulmonary effort is normal. No respiratory distress.      Breath sounds: Normal breath sounds. No wheezing.   Musculoskeletal:         General: Deformity present. No swelling or tenderness.      Cervical back: Neck supple.      Comments: No swelling, tenderness, redness or restriction of motion of the DIPs, PIPs, MCPs, wrists, elbows, ankles.  Bony deformity  b/l 1st MTP, early bunions and tailor bunions also noted.  Bilateral shoulders with full ROM, no evidence of impingement with provocative maneuvers.  Bilateral knees without medial joint line tenderness, no crepitus, no effusion.   Lymphadenopathy:      Cervical: No cervical adenopathy.   Skin:     General: Skin is warm and dry.      Findings: No erythema or rash.      Comments: No malar rash  No periungal erythema  No obvious psoriasis  No fingernail pitting    Neurological:      Mental Status: She is alert and oriented to person, place, and time.      Cranial Nerves: No cranial nerve deficit.      Gait: Gait normal.   Psychiatric:         Mood and Affect: Mood normal.         Behavior: Behavior normal.       ?  Radiology review:       Narrative   PROCEDURE:  XR HAND (MIN 3 VIEWS), RIGHT (CPT=73130)     TECHNIQUE:  Three views of the right hand were obtained.     COMPARISON:  None.     INDICATIONS:  G89.29 Chronic pain of right thumb M79.644 Chronic pain of right thumb Z91.81 History of fall M05.772 Rheumatoid arthritis involving both feet with positive rh*     PATIENT STATED HISTORY: (As transcribed by Technologist)  Patient states she fell one month ago while walking her dog and injured her right thumb. Patient c/o pain in base of right thumb still at metacarpal area.      FINDINGS:  No evidence of acute displaced fracture or dislocation.  Osteopenia.  Unremarkable soft tissues.            Impression   CONCLUSION:  No evidence of acute displaced fracture or dislocation in the right hand.     LOCATION:  [facility]        Dictated by (CST): Sergio Constantino MD on 9/28/2023 at 2:44 PM      Finalized by (CST): Sergio Constantino MD on 9/28/2023 at 2:45 PM       PROCEDURE:  XR DEXA BONE DENSITOMETRY (CPT=77080)     COMPARISON:  [address], XR, XR DEXA BONE DENSITOMETRY (CPT=77080), 6/14/2021, 11:26 AM.     INDICATIONS:    Z13.820 Screening for osteoporosis     PATIENT STATED HISTORY: (As transcribed by Technologist)  Dexa  bone density  Screening for osteoporosis          LUMBAR SPINE ANALYSIS RESULTS:      Bone mineral density (BMD) (g/cm2):  1.091    Lumbar T-Score:  0.4      % young normals:  104      % age matched controls:  118      Change from prior spine examination:  2.1%              TOTAL HIP ANALYSIS RESULTS:        Bone mineral density (BMD) (g/cm2):  0.926      Total Hip T-Score:  -0.1      % young normals:  98      % age matched controls:  109      Change from prior hip examination:  -2.6%              FEMORAL NECK ANALYSIS RESULTS:        Bone mineral density (BMD) (g/cm2):  0.803      Femoral neck T-Score:  -0.4      % young normals:  95      % age matched controls:  111      Change from prior hip examination:  -1.2%            ADDITIONAL FINDINGS:  No significant additional findings.                     Impression   CONCLUSION:  Bone mineral density values are within normal range when compared to normal patient population.           The World Health Organization has defined the following categories based on bone density:  Normal bone:  T-score better than -1  Osteopenia: T-score between -1 and -2.5  Osteoporosis:  T-score less than -2.5        LOCATION:  [facility]                Dictated by (CST): Jonn Urbina MD on 6/15/2023 at 1:49 PM       Narrative                 Impression   PROCEDURE:  XR LUMBAR SPINE (MIN 4 VIEWS) (CPT=72110)     LOCATION:  [facility]     TECHNIQUE:  AP, lateral, oblique, and coned down L5-S1 views were obtained.     COMPARISON:  None.     INDICATIONS:  G89.29 Chronic midline low back pain without sciatica M54.50 Chronic midline low back pain without sciatica     PATIENT STATED HISTORY: (As transcribed by Technologist)  Patient c/o chronic middle lower back pain that radiates into both posterior hips for past several months. Patient states she is also having pain in both feet.         FINDINGS:  Alignment is normal.  Vertebral body heights are maintained throughout the lumbar spine.  Mild loss  of L5-S1 disc space with endplate osteoarthritic changes.  Mild facet hypertrophic changes.  Marginal osteophytes.     IMPRESSION:  Mild to moderate osteoarthritic changes in lower lumbar spine.  Incidentally, there is a large amount of stool in the colon.        Dictated by (CST): Arturo Marcum MD on 1/13/2023 at 10:05 AM      Finalized by (CST): Arturo Marcum MD on 1/13/2023 at 10:06 AM         Exam: MRI FOOT/TOE RT W/O CONTRAST   CPT Code(s): 61826 - MRI LOWER EXTREMITY W/O DYE     EXAM: MRI right foot without contrast 12/30/2022.     COMPARISON:  Right foot radiographs 10/5/2022.     INDICATION:  Bilateral foot pain. Polyarthralgia. Rheumatoid factor positive. Right foot pain at the sole of the foot. Swelling. Limited range of motion. History of breast cancer.     TECHNIQUE:  Multiplanar multisequence MR images of the right mid to forefoot were acquired on a 3 Lindsey imaging system without intravenous contrast.     FINDINGS:  No acute fracture. Mild varus angulation of the fifth metatarsophalangeal joint with minimal subchondral marrow edema in the metatarsal head. Severe cartilage loss at the first tarsometatarsal joint with moderate to large marginal osteophytes,    mild to moderate subchondral marrow edema, and mild subchondral cystic changes. Small cysts are also noted at the medial aspect of the metatarsal head with mild heterogeneity of the medial collateral ligament at suggesting chronic sprain. Joint space   narrowing between the hallux sesamoids and head of the first metatarsal with small to moderate marginal osteophytes. No discrete erosive osseous changes or destructive osseous lesions are appreciated. Small marginal osteophytes at the fourth   tarsometatarsal joint.     The Lisfranc ligament appears intact. Mild thickening of the dorsal talonavicular ligament suggests mild chronic sprain. Trace fluid in the flexor hallucis longus and second flexor tendon sheaths is compatible with  tenosynovitis. Otherwise, visualized   portions of the flexor and extensor tendons appear intact. An os navicular is noted. Ganglion cysts measure 3 x 5 x 5 mm dorsal to the navicular medial cuneiform joint, 4 x 6 x 5 mm medial to the calcaneocuboid joint, and 2 x 3 x 3 mm adjacent to the   medial horn of the navicular. No significant fatty atrophy of the visualized musculature. Mild second and trace first intermetatarsal bursal fluid.     IMPRESSION:   1. Moderate osteoarthritic changes and severe cartilage loss of the first metatarsophalangeal joint with associated sesamoid arthropathy and chronic appearing medial collateral ligament sprain.   2. Mild osteoarthritic changes of the fourth tarsometatarsal joint.   3. Varus angulation of the fifth metatarsophalangeal joint with minimal degenerative changes.   4. Mild flexor hallucis longus and second flexor tenosynovitis.   5. Mild and first and second intermetatarsal bursitis.   6. Small ganglion cysts adjacent to the medial horn of the navicular and naviculocuneiform and calcaneocuboid joints.     This report was performed utilizing speech recognition software technology. Despite thorough proofreading, speech recognition errors could escape detection. If a word or phrase is confusing or out of context, please do not hesitate to call for   clarification.     Interpreting Radiologist:     Saniya Bae M.D.   Electronically Signed: 12/31/2022 09:10 AM    PROCEDURE:  XR CHEST PA + LAT CHEST (CPT=71046)       LOCATION:  [place]       INDICATIONS:  M25.50 Polyarthralgia R76.8 Rheumatoid factor positive       COMPARISON:  [facility] , XR, XR CHEST PA + LAT CHEST (CPT=71020), 7/14/2014, 11:35 PM.       TECHNIQUE:  PA and lateral chest radiographs were obtained.       PATIENT STATED HISTORY: (As transcribed by Technologist)   Rheumatoid factor positive.   No chest complain            FINDINGS:     Heart size is within normal limits.  Pleural spaces appear clear.  Mediastinal and  hilar contours are normal.  No focal consolidation.       Impression   CONCLUSION:  No acute abnormality is seen.      Dictated by (CST): Karlos Torres MD on 12/16/2022 at 2:16 PM             PROCEDURE:  XR FOOT, COMPLETE (MIN 3 VIEWS), LEFT (CPT=73630)       LOCATION:  [facility]         TECHNIQUE:  AP, oblique, and lateral views were obtained.       COMPARISON:  None.       INDICATIONS:  Z85.3 History of breast cancer Z92.29 History of tamoxifen therapy R07.81 Rib pain on right side M79.672 Bilateral foot pain M79.671 Bilateral foot pain       PATIENT STATED HISTORY: (As transcribed by Technologist)  Bilateral foot pain for three months, pain and stiffness in am. History of breast cancer with use of tamoxifen.            FINDINGS:     No acute fractures or osseous lesions are identified.  Phalangeal relationships are within normal limits.  No significant ankle joint effusion.  Tiny calcaneal enthesophytes.  Minimal osteoarthritic changes.                        Impression   CONCLUSION:  No acute findings.           Dictated by (CST): Liliam Leyva MD on 10/05/2022 at 4:17 PM       Finalized by (CST): Liliam Leyva MD on 10/05/2022 at 4:18 PM         PROCEDURE:  XR FOOT, COMPLETE (MIN 3 VIEWS), RIGHT (CPT=73630)       LOCATION:  [facility]         TECHNIQUE:  AP, oblique, and lateral views were obtained.       COMPARISON:  None.       INDICATIONS:  Z85.3 History of breast cancer Z92.29 History of tamoxifen therapy R07.81 Rib pain on right side M79.672 Bilateral foot pain M79.671 Bilateral foot pain       PATIENT STATED HISTORY: (As transcribed by Technologist)  Bilateral foot pain for three months, pain and stiffness in am. History of breast cancer with use of tamoxifen.            FINDINGS:     No acute fractures or osseous lesions are identified.  Phalangeal relationships are within normal limits.  No significant ankle joint effusion.  Osteoarthritic changes.                        Impression   CONCLUSION:  No acute  fractures.  Osteoarthritic changes greatest at the 1st MTP joint.           Dictated by (CST): Liliam Leyva MD on 10/05/2022 at 4:18 PM       Finalized by (CST): Liliam Leyva MD on 10/05/2022 at 4:19 PM          PROCEDURE:  XR HIP W OR WO PELVIS (MIN 5 VIEWS), BILAT (CPT=73523)       LOCATION:  [facility]         TECHNIQUE:  Bilateral min 5 views of the hip and pelvis if performed.       COMPARISON:  None.       INDICATIONS:  Z85.3 History of breast cancer Z92.29 History of tamoxifen therapy R07.81 Rib pain on right side R10.2 Pain in female pelvis       PATIENT STATED HISTORY: (As transcribed by Technologist)  Bilateral hip and pelvic pain for one month, pain and stiffness in pm. History of breast cancer with use of tamoxifen.            FINDINGS:     No acute fractures or osseous lesions are identified.  Femoral acetabular relationships are within normal limits.  Mild osteoarthritic changes within bilateral hips.  Calcified phleboliths within the pelvis.                        Impression   CONCLUSION:  No acute fractures.  Mild osteoarthritic changes.           Dictated by (CST): Liliam Leyva MD on 10/05/2022 at 4:16 PM       Finalized by (CST): Liliam Leyva MD on 10/05/2022 at 4:17 PM        PROCEDURE:  XR DEXA BONE DENSITOMETRY (CPT=77080)       COMPARISON:  None.       INDICATIONS:    Z13.820 Screening for osteoporosis       PATIENT STATED HISTORY: (As transcribed by Technologist)  Dexa bone density   Screening for osteoporosis            LUMBAR SPINE ANALYSIS RESULTS:       Bone mineral density (BMD) (g/cm2):  1.068     Lumbar T-Score:  0.2       % young normals:  102       % age matched controls:  114       Change from prior spine examination:  N/A                TOTAL HIP ANALYSIS RESULTS:         Bone mineral density (BMD) (g/cm2):  0.950       Total Hip T-Score:  0.1       % young normals:  101       % age matched controls:  110       Change from prior hip examination:  N/A                 FEMORAL NECK ANALYSIS RESULTS:         Bone mineral density (BMD) (g/cm2):  0.813       Femoral neck T-Score:  -0.3       % young normals:  96       % age matched controls:  110       Change from prior hip examination:  N/A               ADDITIONAL FINDINGS:  No significant additional findings.                          Impression   CONCLUSION:  Negative for osteopenia.       The World Health Organization has defined the following categories based on bone density:   Normal bone:  T-score better than -1   Osteopenia: T-score between -1 and -2.5   Osteoporosis:  T-score less than -2.5               Dictated by (CST): Rodrigo Paiz MD on 6/14/2021 at 12:29 PM       Finalized by (CST): Rodrigo Paiz MD on 6/14/2021 at 12:30 PM              Labs:  Lab Results   Component Value Date    WBC 5.7 12/14/2023    RBC 4.43 12/14/2023    HGB 13.6 12/14/2023    HCT 42.3 12/14/2023    .0 (L) 12/14/2023    MCV 91.0 02/26/2024    MCH 30.7 12/14/2023    MCHC 34.7 02/26/2024    RDW 12.5 12/14/2023    NEPRELIM 3.70 12/14/2023    NEUTABS 13.78 (H) 09/25/2020    LYMPHABS 2.12 09/25/2020    EOSABS 0.21 09/25/2020    BASABS 0.00 09/25/2020    NEUT 60 09/25/2020    LYMPH 10 09/25/2020    MON 7 09/25/2020    EOS 1 09/25/2020    BASO 0 09/25/2020    NEPERCENT 68.9 02/26/2024    LYPERCENT 22.5 02/26/2024    MOPERCENT 16.1 12/14/2023    EOPERCENT 0.4 12/14/2023    BAPERCENT 0.5 12/14/2023    NE 3.70 12/14/2023    LYMABS 1.03 12/14/2023    MOABSO 0.92 12/14/2023    EOABSO 0.02 12/14/2023    BAABSO 0.03 12/14/2023     Lab Results   Component Value Date     (H) 12/14/2023    BUN 12 12/14/2023    BUNCREA NOT APPLICABLE 12/02/2022    CREATSERUM 1.02 12/14/2023    ANIONGAP 7 12/14/2023    GFRNAA 90 12/21/2021    GFRAA 105 12/21/2021    CA 9.0 12/14/2023    OSMOCALC 288 12/14/2023    ALKPHO 40 (L) 12/14/2023    AST 24 12/14/2023    ALT 20 12/14/2023    BILT 0.3 12/14/2023    TP 7.5 12/14/2023    ALB 3.8 12/14/2023    GLOBULIN 3.7  12/14/2023    AGRATIO 1.3 12/02/2022     12/14/2023    K 3.7 12/14/2023     12/14/2023    CO2 27.0 12/14/2023       Additional Labs:  12/13/2022   positive  CCP 0.8 negative  Hepatitis panel nonreactive  SPEP grossly normal   ANCA negative, MPO negative, WI-3 negative    12/02/2022   positive   CCP negative  LEISA negative   Lyme screen negative   ESR normal  CRP normal  B12 412   Vit D 58 normal     Jennifer Suzanne, DO  [facility]  07/22/2024

## 2024-07-23 PROBLEM — M25.551 BILATERAL HIP PAIN: Status: ACTIVE | Noted: 2024-07-23

## 2024-07-23 PROBLEM — M05.772 RHEUMATOID ARTHRITIS INVOLVING BOTH FEET WITH POSITIVE RHEUMATOID FACTOR (HCC): Status: ACTIVE | Noted: 2024-07-23

## 2024-07-23 PROBLEM — M05.771 RHEUMATOID ARTHRITIS INVOLVING BOTH FEET WITH POSITIVE RHEUMATOID FACTOR (HCC): Status: ACTIVE | Noted: 2024-07-23

## 2024-07-23 PROBLEM — Z85.3 HISTORY OF BREAST CANCER: Status: ACTIVE | Noted: 2024-07-23

## 2024-07-23 PROBLEM — M25.552 BILATERAL HIP PAIN: Status: ACTIVE | Noted: 2024-07-23

## 2024-07-26 ENCOUNTER — LAB ENCOUNTER (OUTPATIENT)
Dept: LAB | Age: 59
End: 2024-07-26
Attending: FAMILY MEDICINE
Payer: COMMERCIAL

## 2024-07-26 DIAGNOSIS — M25.50 POLYARTHRALGIA: ICD-10-CM

## 2024-07-26 DIAGNOSIS — M05.772 RHEUMATOID ARTHRITIS INVOLVING BOTH FEET WITH POSITIVE RHEUMATOID FACTOR (HCC): ICD-10-CM

## 2024-07-26 DIAGNOSIS — M05.771 RHEUMATOID ARTHRITIS INVOLVING BOTH FEET WITH POSITIVE RHEUMATOID FACTOR (HCC): ICD-10-CM

## 2024-07-26 DIAGNOSIS — I80.9 THROMBOPHLEBITIS: ICD-10-CM

## 2024-07-26 LAB
ALBUMIN SERPL-MCNC: 4.4 G/DL (ref 3.2–4.8)
ALBUMIN/GLOB SERPL: 1.6 {RATIO} (ref 1–2)
ALP LIVER SERPL-CCNC: 43 U/L
ALT SERPL-CCNC: 16 U/L
ANION GAP SERPL CALC-SCNC: 5 MMOL/L (ref 0–18)
AST SERPL-CCNC: 30 U/L (ref ?–34)
BASOPHILS # BLD AUTO: 0.06 X10(3) UL (ref 0–0.2)
BASOPHILS NFR BLD AUTO: 1.3 %
BILIRUB SERPL-MCNC: 0.5 MG/DL (ref 0.3–1.2)
BUN BLD-MCNC: 14 MG/DL (ref 9–23)
CALCIUM BLD-MCNC: 9.6 MG/DL (ref 8.7–10.4)
CHLORIDE SERPL-SCNC: 107 MMOL/L (ref 98–112)
CO2 SERPL-SCNC: 28 MMOL/L (ref 21–32)
CREAT BLD-MCNC: 0.79 MG/DL
CRP SERPL-MCNC: <0.4 MG/DL (ref ?–0.5)
EGFRCR SERPLBLD CKD-EPI 2021: 87 ML/MIN/1.73M2 (ref 60–?)
EOSINOPHIL # BLD AUTO: 0.25 X10(3) UL (ref 0–0.7)
EOSINOPHIL NFR BLD AUTO: 5.6 %
ERYTHROCYTE [DISTWIDTH] IN BLOOD BY AUTOMATED COUNT: 12.3 %
ERYTHROCYTE [SEDIMENTATION RATE] IN BLOOD: 12 MM/HR
FASTING STATUS PATIENT QL REPORTED: NO
GLOBULIN PLAS-MCNC: 2.8 G/DL (ref 2–3.5)
GLUCOSE BLD-MCNC: 94 MG/DL (ref 70–99)
HCT VFR BLD AUTO: 38.8 %
HGB BLD-MCNC: 13.4 G/DL
IMM GRANULOCYTES # BLD AUTO: 0.01 X10(3) UL (ref 0–1)
IMM GRANULOCYTES NFR BLD: 0.2 %
LYMPHOCYTES # BLD AUTO: 1.46 X10(3) UL (ref 1–4)
LYMPHOCYTES NFR BLD AUTO: 32.6 %
MCH RBC QN AUTO: 31.5 PG (ref 26–34)
MCHC RBC AUTO-ENTMCNC: 34.5 G/DL (ref 31–37)
MCV RBC AUTO: 91.1 FL
MONOCYTES # BLD AUTO: 0.5 X10(3) UL (ref 0.1–1)
MONOCYTES NFR BLD AUTO: 11.2 %
NEUTROPHILS # BLD AUTO: 2.2 X10 (3) UL (ref 1.5–7.7)
NEUTROPHILS # BLD AUTO: 2.2 X10(3) UL (ref 1.5–7.7)
NEUTROPHILS NFR BLD AUTO: 49.1 %
OSMOLALITY SERPL CALC.SUM OF ELEC: 290 MOSM/KG (ref 275–295)
PLATELET # BLD AUTO: 173 10(3)UL (ref 150–450)
POTASSIUM SERPL-SCNC: 3.9 MMOL/L (ref 3.5–5.1)
PROT SERPL-MCNC: 7.2 G/DL (ref 5.7–8.2)
RBC # BLD AUTO: 4.26 X10(6)UL
SODIUM SERPL-SCNC: 140 MMOL/L (ref 136–145)
WBC # BLD AUTO: 4.5 X10(3) UL (ref 4–11)

## 2024-07-26 PROCEDURE — 86140 C-REACTIVE PROTEIN: CPT

## 2024-07-26 PROCEDURE — 85652 RBC SED RATE AUTOMATED: CPT

## 2024-07-26 PROCEDURE — 80053 COMPREHEN METABOLIC PANEL: CPT

## 2024-07-26 PROCEDURE — 85610 PROTHROMBIN TIME: CPT

## 2024-07-26 PROCEDURE — 85025 COMPLETE CBC W/AUTO DIFF WBC: CPT

## 2024-07-26 PROCEDURE — 86147 CARDIOLIPIN ANTIBODY EA IG: CPT

## 2024-07-26 PROCEDURE — 86146 BETA-2 GLYCOPROTEIN ANTIBODY: CPT

## 2024-07-26 PROCEDURE — 36415 COLL VENOUS BLD VENIPUNCTURE: CPT

## 2024-07-29 LAB
APTT: 26.7 SEC
B2 GLYCOPROT I IGG AB: <9 GPI IGG UNITS
B2 GLYCOPROT I IGM AB: 11 GPI IGM UNITS
CARDIOLIPIN IGG: <9 GPL U/ML
CARDIOLIPIN IGM: 14 MPL U/ML
DRVVT: 39.3 SEC
HEXAGONAL PHASE PHOSPHOLIPID: 6 SEC
INR: 1
PT: 10.8 SEC
THROMBIN TIME: 19 SEC

## 2024-08-17 ENCOUNTER — OFFICE VISIT (OUTPATIENT)
Dept: FAMILY MEDICINE CLINIC | Facility: CLINIC | Age: 59
End: 2024-08-17
Payer: COMMERCIAL

## 2024-08-17 VITALS
BODY MASS INDEX: 24.05 KG/M2 | WEIGHT: 168 LBS | HEIGHT: 70 IN | HEART RATE: 77 BPM | RESPIRATION RATE: 18 BRPM | DIASTOLIC BLOOD PRESSURE: 51 MMHG | TEMPERATURE: 98 F | SYSTOLIC BLOOD PRESSURE: 101 MMHG | OXYGEN SATURATION: 97 %

## 2024-08-17 DIAGNOSIS — J01.40 ACUTE NON-RECURRENT PANSINUSITIS: Primary | ICD-10-CM

## 2024-08-17 PROCEDURE — 99213 OFFICE O/P EST LOW 20 MIN: CPT | Performed by: NURSE PRACTITIONER

## 2024-08-17 RX ORDER — AMOXICILLIN AND CLAVULANATE POTASSIUM 875; 125 MG/1; MG/1
1 TABLET, FILM COATED ORAL 2 TIMES DAILY
Qty: 14 TABLET | Refills: 0 | Status: SHIPPED | OUTPATIENT
Start: 2024-08-17 | End: 2024-08-24

## 2024-08-17 NOTE — PROGRESS NOTES
CHIEF COMPLAINT:     Chief Complaint   Patient presents with    Sinus Problem     Symptoms started two weeks ago :  Sinus pressure , tooth ache, cough, and congestion.    OTC: allergy pill, flonase, singulair. Only meloxicam        HPI:   Yahaira Agrawal is a 59 year old female who presents for sinus congestion for  2  weeks. Symptoms have been worsening since onset. Sinus congestion/pain is described as a pressure and is located mainly front of face.  Reports nasal discharge. Has treated symptoms with allergy pill, flonase, singulair.  Patient also reports headache, cough, fullness in ears, dental discomfort.  Denies fever, chills, tinnitus, N/V/D.        Current Outpatient Medications   Medication Sig Dispense Refill    amoxicillin clavulanate 875-125 MG Oral Tab Take 1 tablet by mouth 2 (two) times daily for 7 days. 14 tablet 0    TAMOXIFEN 20 MG Oral Tab TAKE 1 TABLET DAILY 90 tablet 3    Meloxicam 7.5 MG Oral Tab Take 1 tablet (7.5 mg total) by mouth 2 (two) times daily as needed for Pain. 180 tablet 0    HYDROXYCHLOROQUINE 200 MG Oral Tab TAKE 1 TABLET TWICE A  tablet 3    albuterol 108 (90 Base) MCG/ACT Inhalation Aero Soln Inhale 2 puffs into the lungs every 6 (six) hours as needed for Wheezing. (Patient taking differently: Inhale 2 puffs into the lungs as needed for Wheezing.) 1 each 1    montelukast 10 MG Oral Tab Take 1 tablet (10 mg total) by mouth nightly. 90 tablet 3    cetirizine 10 MG Oral Tab Take 1 tablet (10 mg total) by mouth daily. 90 tablet 0    Misc Natural Products (TURMERIC CURCUMIN) Oral Cap Take 1,500 mg by mouth daily.      Omega-3 Fatty Acids (FISH OIL OR) Take 1,280 mg by mouth daily.      Calcium Carbonate-Vitamin D (CALCIUM 600 + D OR) Take 1 tablet by mouth daily.      Multiple Vitamin (MULTIVITAMIN ADULT OR) Take by mouth.      fluticasone propionate 50 MCG/ACT Nasal Suspension 2 sprays by Nasal route nightly. (Patient taking differently: 2 sprays by Nasal route as  needed.) 48 mL 1    B Complex Vitamins (VITAMIN B COMPLEX 100 IJ)       Ergocalciferol (VITAMIN D OR) Take 2,000 Units by mouth daily.      Probiotic Product (PROBIOTIC DAILY OR) Take by mouth.        Past Medical History:    Allergic rhinitis due to pollen    Skin tests 2013- allergic to mold, ragweed, grass, cats, pollen    Asthma (HCC)    allergy/exercise-induced    Bell's palsy    Breast CA (HCC)    55yrs old (invasive ductal).    Breast cancer in female (HCC)    Right Breast IDC    Ductal carcinoma in situ of breast    right    Easy bruising    Extrinsic asthma, unspecified    Migraines    Rheumatoid arthritis (HCC)    Schmorl's node    neck    Visual impairment    READERS      Past Surgical History:   Procedure Laterality Date    Breast biopsy Right 07/15/2020    Chemotherapy      chemo after lumpectomy 10/2020-12/2020    Colonoscopy  05/12/2021    Dental surgery procedure  11/1/08    Implants    Lumpectomy right  08/13/2020    Other      vein treatment    Radiation right      4wks Dec 2020-Jan 2021    Tonsillectomy        Family History   Problem Relation Age of Onset    DCIS Self 55    Breast Cancer Self 55        IDC    Colon Cancer Mother 77    Other (Malt Lymphoma (Cancer)) Mother 73        oral cancer;undergoing treatment    Hypertension Father     Prostate Cancer Father 70    Thyroid disease Sister     No Known Problems Brother     No Known Problems Brother     Allergies Daughter     Anxiety Son     Depression Son     No Known Problems Son     Diabetes Maternal Grandmother     Colon Cancer Maternal Grandmother         Liver and Pancreatic CA, unknown age for dx    Diabetes Maternal Grandfather     Other (Multiple Myeloma) Maternal Aunt         unknown age at dx    Breast Cancer Paternal Aunt 57    Other (Bone Cancer) Paternal Aunt 83    Other (Brain Cancer) Paternal Aunt 64    Other (Lung Cancer) Paternal Aunt 59      Social History     Socioeconomic History    Marital status:     Number of  children: 3   Occupational History    Occupation: Homemaker    Occupation: marketing    Occupation: development   Tobacco Use    Smoking status: Never    Smokeless tobacco: Never   Vaping Use    Vaping status: Never Used   Substance and Sexual Activity    Alcohol use: Yes     Alcohol/week: 1.0 - 4.0 standard drink of alcohol     Types: 1 - 4 Glasses of wine per week     Comment: Socially    Drug use: No   Other Topics Concern    Caffeine Concern Yes     Comment: 1 a day    Exercise Yes     Comment: 2-3 times/week    Seat Belt Yes     Social Determinants of Health      Received from [facility], [facility]    Social Connections    Received from [facility], [facility]    Housing Stability         REVIEW OF SYSTEMS:   GENERAL: feels well otherwise, no unplanned weight change,  good appetite  SKIN: no rashes or abnormal skin lesions  HEENT: See HPI.    LUNGS: denies shortness of breath or wheezing, See HPI  CARDIOVASCULAR: denies chest pain or palpitations   GI: denies N/V/C or abdominal pain  NEURO: + sinus headaches.  No numbness or tingling in face.    EXAM:   /51 (BP Location: Left arm, Patient Position: Sitting, Cuff Size: adult)   Pulse 77   Temp 97.9 °F (36.6 °C) (Temporal)   Resp 18   Ht 5' 10\" (1.778 m)   Wt 168 lb (76.2 kg)   LMP 07/22/2020   SpO2 97%   BMI 24.11 kg/m²   GENERAL: well developed, well nourished,in no apparent distress  SKIN: no rashes,no suspicious lesions  HEAD: atraumatic, normocephalic,  + tenderness on palpation of sinuses  EYES: conjunctiva clear, EOM intact  EARS: TM's clear gray, no bulging, no retraction, + fluid, bony landmarks intact  NOSE: nostrils patent, clear nasal mucous, nasal mucosa reddened and swollen  THROAT: oral mucosa pink, moist. No visible dental caries. Posterior pharynx is no erythematous. no exudates.  NECK: supple, non-tender  LUNGS: clear to auscultation bilaterally, no  wheezes or rhonchi, no diminished breath sounds. Breathing is non labored.  CARDIO: RRR without murmur  EXTREMITIES: no cyanosis, clubbing or edema  LYMPH:  bilateral anterior cervical lymphadenopathy.   NEURO: No focal deficits       ASSESSMENT AND PLAN:   ASSESSMENT:  Yahaira Agrawal is a 59 year old female who presents with    ASSESSMENT:   Encounter Diagnosis   Name Primary?    Acute non-recurrent pansinusitis Yes     1. Acute non-recurrent pansinusitis  - amoxicillin clavulanate 875-125 MG Oral Tab; Take 1 tablet by mouth 2 (two) times daily for 7 days.  Dispense: 14 tablet; Refill: 0    PLAN: Meds and instructions as below.  Comfort care instructions as listed in Patient Instructions.  To f/u with PCP if no improvement in 3-5 days or sooner if sx worsen.    Meds & Refills for this Visit:  Requested Prescriptions     Signed Prescriptions Disp Refills    amoxicillin clavulanate 875-125 MG Oral Tab 14 tablet 0     Sig: Take 1 tablet by mouth 2 (two) times daily for 7 days.       Risks, benefits, side effects of medication addressed and explained.    See pt handout    The patient indicates understanding of these issues and agrees to the plan.

## 2024-09-02 ENCOUNTER — OFFICE VISIT (OUTPATIENT)
Dept: FAMILY MEDICINE CLINIC | Facility: CLINIC | Age: 59
End: 2024-09-02
Payer: COMMERCIAL

## 2024-09-02 VITALS
HEIGHT: 70 IN | WEIGHT: 172 LBS | BODY MASS INDEX: 24.62 KG/M2 | SYSTOLIC BLOOD PRESSURE: 120 MMHG | DIASTOLIC BLOOD PRESSURE: 72 MMHG | RESPIRATION RATE: 16 BRPM | HEART RATE: 60 BPM | OXYGEN SATURATION: 99 % | TEMPERATURE: 98 F
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
## 2024-09-02 DIAGNOSIS — L08.9 SKIN INFECTION: ICD-10-CM

## 2024-09-02 NOTE — PROGRESS NOTES
CHIEF COMPLAINT:     Chief Complaint   Patient presents with    Insect Bite     Saturday, right shin, Redness, getting worse, hot, itcy, painful  OTC ice, neosporin,           HPI:    Yahaira gArawal is a 59 year old female who presents for evaluation of a rash.  Per patient rash started in the past few days. Rash has been worsening since onset.  Patient has had similar rash in the past. The rash is characterized by painful, itching bump. The affected locations include left shin. Patient has treated rash with otc medication.  Associated symptoms include: painful, warm, hot  Exposure: gardening. Pt states she has had this before with a bug bite and it becomes increasingly more severe when left untreated and with patient being on chemo treatment and plaquenil her immune system is lower.    Pertinent negatives include no anorexia, congestion, cough, diarrhea, eye pain, facial edema, fatigue, fever, joint pain, rhinorrhea, shortness of breath, sore throat or vomiting.      Current Outpatient Medications   Medication Sig Dispense Refill    amoxicillin clavulanate 875-125 MG Oral Tab Take 1 tablet by mouth 2 (two) times daily for 7 days. 14 tablet 0    TAMOXIFEN 20 MG Oral Tab TAKE 1 TABLET DAILY 90 tablet 3    Meloxicam 7.5 MG Oral Tab Take 1 tablet (7.5 mg total) by mouth 2 (two) times daily as needed for Pain. 180 tablet 0    HYDROXYCHLOROQUINE 200 MG Oral Tab TAKE 1 TABLET TWICE A  tablet 3    albuterol 108 (90 Base) MCG/ACT Inhalation Aero Soln Inhale 2 puffs into the lungs every 6 (six) hours as needed for Wheezing. (Patient taking differently: Inhale 2 puffs into the lungs as needed for Wheezing.) 1 each 1    montelukast 10 MG Oral Tab Take 1 tablet (10 mg total) by mouth nightly. 90 tablet 3    cetirizine 10 MG Oral Tab Take 1 tablet (10 mg total) by mouth daily. 90 tablet 0    Misc Natural Products (TURMERIC CURCUMIN) Oral Cap Take 1,500 mg by mouth daily.      Omega-3 Fatty Acids (FISH OIL OR) Take  1,280 mg by mouth daily.      Calcium Carbonate-Vitamin D (CALCIUM 600 + D OR) Take 1 tablet by mouth daily.      Multiple Vitamin (MULTIVITAMIN ADULT OR) Take by mouth.      fluticasone propionate 50 MCG/ACT Nasal Suspension 2 sprays by Nasal route nightly. (Patient taking differently: 2 sprays by Nasal route as needed.) 48 mL 1    B Complex Vitamins (VITAMIN B COMPLEX 100 IJ)       Ergocalciferol (VITAMIN D OR) Take 2,000 Units by mouth daily.      Probiotic Product (PROBIOTIC DAILY OR) Take by mouth.        Past Medical History:    Allergic rhinitis due to pollen    Skin tests 2013- allergic to mold, ragweed, grass, cats, pollen    Asthma (HCC)    allergy/exercise-induced    Bell's palsy    Breast CA (HCC)    55yrs old (invasive ductal).    Breast cancer in female (HCC)    Right Breast IDC    Ductal carcinoma in situ of breast    right    Easy bruising    Extrinsic asthma, unspecified    Migraines    Rheumatoid arthritis (HCC)    Schmorl's node    neck    Visual impairment    READERS      Past Surgical History:   Procedure Laterality Date    Breast biopsy Right 07/15/2020    Chemotherapy      chemo after lumpectomy 10/2020-12/2020    Colonoscopy  05/12/2021    Dental surgery procedure  11/1/08    Implants    Lumpectomy right  08/13/2020    Other      vein treatment    Radiation right      4wks Dec 2020-Jan 2021    Tonsillectomy        Family History   Problem Relation Age of Onset    DCIS Self 55    Breast Cancer Self 55        IDC    Colon Cancer Mother 77    Other (Malt Lymphoma (Cancer)) Mother 73        oral cancer;undergoing treatment    Hypertension Father     Prostate Cancer Father 70    Thyroid disease Sister     No Known Problems Brother     No Known Problems Brother     Allergies Daughter     Anxiety Son     Depression Son     No Known Problems Son     Diabetes Maternal Grandmother     Colon Cancer Maternal Grandmother         Liver and Pancreatic CA, unknown age for dx    Diabetes Maternal Grandfather      Other (Multiple Myeloma) Maternal Aunt         unknown age at dx    Breast Cancer Paternal Aunt 57    Other (Bone Cancer) Paternal Aunt 83    Other (Brain Cancer) Paternal Aunt 64    Other (Lung Cancer) Paternal Aunt 59      Social History     Socioeconomic History    Marital status:     Number of children: 3   Occupational History    Occupation: Homemaker    Occupation: marketing    Occupation: development   Tobacco Use    Smoking status: Never    Smokeless tobacco: Never   Vaping Use    Vaping status: Never Used   Substance and Sexual Activity    Alcohol use: Yes     Alcohol/week: 1.0 - 4.0 standard drink of alcohol     Types: 1 - 4 Glasses of wine per week     Comment: Socially    Drug use: No   Other Topics Concern    Caffeine Concern Yes     Comment: 1 a day    Exercise Yes     Comment: 2-3 times/week    Seat Belt Yes     Social Determinants of Health      Received [facility], [facility]    Social Connections    Received from [facility], [facility]    Housing Stability         REVIEW OF SYSTEMS:   GENERAL: feels well otherwise, no fever, no chills.  SKIN: Per HPI. No edema. No ulcerations.  HEENT: Denies rhinorrhea, edema of the lips or swelling of throat.  CARDIOVASCULAR: Denies chest pains or palpitations.  LUNGS: Denies shortness of breath with exertion or rest. No cough or wheezing.  LYMPH: Denies enlargement of the lymph nodes.  NEURO: Denies abnormal sensation, tingling of the skin, or numbness.      EXAM:   /72   Pulse 60   Temp 97.7 °F (36.5 °C)   Resp 16   Ht 5' 10\" (1.778 m)   Wt 172 lb (78 kg)   LMP 07/22/2020   SpO2 99%   BMI 24.68 kg/m²   GENERAL: well developed, well nourished,in no apparent distress  SKIN: left lower shin half dollar size area of pruitic, tender erythematous skin that is warm to touch.  EYES: PERRLA, EOMI, conjunctiva are clear  HENT: Head atraumatic, normocephalic. TM's  WNL bilaterally. Normal external nose. Nasal mucosa pink without edema. No erythema of the throat. Oropharynx moist without lesions.  LUNGS: Clear to auscultation bilaterally.  No wheezing, rhonchi, or rales.  No diminished breath sounds. No increased work of breathing.   CARDIO: RRR without murmur  JOINTS: normal ROM   LYMPH: No  lymphadenopathy.     ASSESSMENT AND PLAN:   Yahaira Agrawal is a 59 year old female who presents for evaluation of a rash. Findings are consistent with:    ASSESSMENT:  Encounter Diagnoses   Name Primary?    Bug bite, initial encounter Yes    Skin infection        PLAN: Meds as listed below.  Comfort measures as described in Patient Instructions.  Skin care discussed with patient.     Educated on supportive measures: ibuprofen/tylenol, hydration, zyrtec  Educated on s/s of worsening sx and when to seek higher level of care  Follow up with pcp if not improving      Meds & Refills for this Visit:  Requested Prescriptions     Signed Prescriptions Disp Refills    amoxicillin clavulanate 875-125 MG Oral Tab 14 tablet 0     Sig: Take 1 tablet by mouth 2 (two) times daily for 7 days.         Risk and benefits of medication discussed.     The patient indicates understanding of these issues and agrees to the plan.  The patient is asked to return in 3 days if sx's persist or worsen.

## 2024-09-09 ENCOUNTER — HOSPITAL ENCOUNTER (OUTPATIENT)
Dept: MRI IMAGING | Facility: HOSPITAL | Age: 59
Discharge: HOME | End: 2024-09-09
Payer: COMMERCIAL

## 2024-09-09 DIAGNOSIS — C50.211 MALIGNANT NEOPLASM OF UPPER-INNER QUADRANT OF RIGHT BREAST IN FEMALE, ESTROGEN RECEPTOR POSITIVE (HCC): ICD-10-CM

## 2024-09-09 DIAGNOSIS — Z17.0 MALIGNANT NEOPLASM OF UPPER-INNER QUADRANT OF RIGHT BREAST IN FEMALE, ESTROGEN RECEPTOR POSITIVE (HCC): ICD-10-CM

## 2024-09-09 PROCEDURE — A9575 INJ GADOTERATE MEGLUMI 0.1ML: HCPCS

## 2024-09-09 PROCEDURE — 77049 MRI BREAST C-+ W/CAD BI: CPT

## 2024-09-09 RX ORDER — GADOTERATE MEGLUMINE 376.9 MG/ML
15 INJECTION INTRAVENOUS
Status: COMPLETED | OUTPATIENT
Start: 2024-09-09 | End: 2024-09-09

## 2024-09-09 RX ADMIN — GADOTERATE MEGLUMINE 15 ML: 376.9 INJECTION INTRAVENOUS at 18:19:00

## 2024-10-31 ENCOUNTER — OFFICE VISIT (OUTPATIENT)
Dept: FAMILY MEDICINE CLINIC | Facility: CLINIC | Age: 59
End: 2024-10-31
Payer: COMMERCIAL

## 2024-10-31 VITALS
HEIGHT: 69 IN | WEIGHT: 168 LBS | RESPIRATION RATE: 16 BRPM | OXYGEN SATURATION: 97 % | HEART RATE: 77 BPM | DIASTOLIC BLOOD PRESSURE: 68 MMHG | SYSTOLIC BLOOD PRESSURE: 116 MMHG | TEMPERATURE: 98 F | BODY MASS INDEX: 24.88 KG/M2

## 2024-10-31 DIAGNOSIS — J06.9 URI WITH COUGH AND CONGESTION: Primary | ICD-10-CM

## 2024-10-31 PROCEDURE — 99213 OFFICE O/P EST LOW 20 MIN: CPT

## 2024-10-31 RX ORDER — GABAPENTIN 100 MG/1
1 CAPSULE ORAL 3 TIMES DAILY
COMMUNITY
Start: 2022-02-16

## 2024-10-31 NOTE — PROGRESS NOTES
Subjective:   Patient ID: Yahaira Agrawal is a 59 year old female.    Patient presents with 1 week of nasal congestion and cough. Reports bilateral ear pressure started today. Denies any known exposures. Reports history of allergie. Taking zyrtec, singualir, flonase, nasal saline and vitamin C for symptoms. Denies fever, shortness of breath, difficulty breathing or chest pain.        History/Other:   Review of Systems   Constitutional:  Negative for chills and fever.   HENT:  Positive for congestion, ear pain and postnasal drip.    Respiratory:  Positive for cough. Negative for chest tightness, shortness of breath and wheezing.    Cardiovascular:  Negative for chest pain.   All other systems reviewed and are negative.    Current Outpatient Medications   Medication Sig Dispense Refill    TAMOXIFEN 20 MG Oral Tab TAKE 1 TABLET DAILY 90 tablet 3    Meloxicam 7.5 MG Oral Tab Take 1 tablet (7.5 mg total) by mouth 2 (two) times daily as needed for Pain. 180 tablet 0    HYDROXYCHLOROQUINE 200 MG Oral Tab TAKE 1 TABLET TWICE A  tablet 3    montelukast 10 MG Oral Tab Take 1 tablet (10 mg total) by mouth nightly. 90 tablet 3    cetirizine 10 MG Oral Tab Take 1 tablet (10 mg total) by mouth daily. 90 tablet 0    Omega-3 Fatty Acids (FISH OIL OR) Take 1,280 mg by mouth daily.      Calcium Carbonate-Vitamin D (CALCIUM 600 + D OR) Take 1 tablet by mouth daily.      Multiple Vitamin (MULTIVITAMIN ADULT OR) Take by mouth.      B Complex Vitamins (VITAMIN B COMPLEX 100 IJ)       Probiotic Product (PROBIOTIC DAILY OR) Take by mouth.      gabapentin 100 MG Oral Cap Take 1 capsule (100 mg total) by mouth 3 (three) times daily. (Patient not taking: Reported on 10/31/2024)      albuterol 108 (90 Base) MCG/ACT Inhalation Aero Soln Inhale 2 puffs into the lungs every 6 (six) hours as needed for Wheezing. (Patient taking differently: Inhale 2 puffs into the lungs as needed for Wheezing.) 1 each 1    Misc Natural Products  (TURMERIC CURCUMIN) Oral Cap Take 1,500 mg by mouth daily.      fluticasone propionate 50 MCG/ACT Nasal Suspension 2 sprays by Nasal route nightly. (Patient taking differently: 2 sprays by Nasal route as needed.) 48 mL 1    Ergocalciferol (VITAMIN D OR) Take 2,000 Units by mouth daily.       Allergies:Allergies[1]    Objective:   Physical Exam  Vitals reviewed.   Constitutional:       General: She is not in acute distress.     Appearance: Normal appearance. She is not ill-appearing or toxic-appearing.   HENT:      Head: Normocephalic and atraumatic.      Right Ear: Ear canal and external ear normal. A middle ear effusion is present. Tympanic membrane is not erythematous, retracted or bulging.      Left Ear: Ear canal and external ear normal. A middle ear effusion is present. Tympanic membrane is not erythematous, retracted or bulging.      Nose: Nose normal.      Mouth/Throat:      Mouth: Mucous membranes are moist.      Pharynx: Oropharynx is clear. Uvula midline. Postnasal drip present. No oropharyngeal exudate, posterior oropharyngeal erythema or uvula swelling.      Tonsils: No tonsillar exudate or tonsillar abscesses.   Cardiovascular:      Rate and Rhythm: Normal rate and regular rhythm.      Pulses: Normal pulses.      Heart sounds: Normal heart sounds.   Pulmonary:      Effort: Pulmonary effort is normal. No respiratory distress.      Breath sounds: Normal breath sounds. No decreased breath sounds, wheezing, rhonchi or rales.      Comments: Dry cough with deep inspiration.  Musculoskeletal:         General: Normal range of motion.      Cervical back: Normal range of motion and neck supple.   Lymphadenopathy:      Cervical: No cervical adenopathy.   Skin:     General: Skin is warm and dry.      Capillary Refill: Capillary refill takes less than 2 seconds.   Neurological:      General: No focal deficit present.      Mental Status: She is alert and oriented to person, place, and time.         Assessment & Plan:    1. URI with cough and congestion      Reassurance given. Discussion about supportive treatment including over the counter medications, hydration, rest and humidifier. Discussion to use Mucinex in am. Discussion about viral symptoms worsening through day 7 and then improving with resolution around day 14.      Meds This Visit:  Requested Prescriptions      No prescriptions requested or ordered in this encounter       Imaging & Referrals:  None         [1]   Allergies  Allergen Reactions    Seasonal Runny nose and Coughing

## 2024-11-06 NOTE — PROGRESS NOTES
[facility] Progress Note      Patient Name: Yahaira Agrawal   YOB: 1965  Medical Record Number: [id number]  Attending Physician: Farhan Joaquin M.D.     The 21st Century Cures Act makes medical notes like these available to patients in the interest of transparency. Please be advised this is a medical document. Medical documents are intended to carry relevant information, facts as evident, and the clinical opinion of the practitioner. The medical note is intended as peer to peer communication and may appear blunt or direct. It is written in medical language and may contain abbreviations or verbiage that are unfamiliar.     Date of Visit: 11/7/2024       Chief Complaint  Invasive ductal carcinoma, right breast - follow up.    Oncologic History  Yahaira Agrawal is a 59 year old female who on 08/13/2020 underwent right breast lumpectomy with sentinel lymph node biopsy for a grade 3, 0.4 cm infiltrating ductal carcinoma with 1 negative lymph node (0/1). Immunohistochemical studies were as follows: estrogen receptor 90% (strong), progesterone receptor 90% (strong), Her2 0, Ki67 25%. She was staged as [id number].    OncotypeDX testing showed a recurrence score of 28. She received four cycles of adjuvant chemotherapy with docetaxel and cyclophosphamide. In 12/2020 she began adjuvant endocrine therapy with tamoxifen. From 12/29/2020 to 01/26/2021 she received adjuvant radiation therapy.     No known pathogenic variants were found in the following 9 genes: KARTHIK, BRCA1, BRCA2, CDH1, CHEK2, PALB2, PTEN, STK11, and TP53.      History of Present Illness  Patient returns for follow up. She denies any self palpated breast masses or nipple discharge. She denies any vaginal bleeding. No new respiratory symptoms.     Performance Status   Karnofsky 100% - Normal, no complaints.    Past Medical History (historical data, reviewed by physician)   Invasive ductal carcinoma, right breast (as above);  rheumatoid arthritis; asthma; Bell's palsy.     Past Surgical History (historical data, reviewed by physician)   Right breast lumpectomy with sentinel lymph node biopsy; dental implants; tonsillectomy.    Family History (historical data, reviewed by physician)   Ms. Agrawal has a daughter and two sons. Ms. Agrawal has two younger brothers and one younger sister. Ms. Agrawal’s sister had a partial thyroidectomy at age 19 for a benign thyroid nodule.       Ms. Agrawal’s mother was diagnosed with a right sided colon cancer in 2019. Immunohistochemistry for mismatch repair proteins showed loss of MLH1, MSH6, and PMS2. MSH2 expression was intact.  Her reported medical history is also notable for MALT lymphoma of the oral cavity in .   Genetic testing on Ms. Agrawal’s mother showed no germline pathogenic variants in MLH1, MSH2 (including EPCAM deletion/duplication analysis), MSH6, or PMS2.  Two somatic MSH6 pathogenic variants were detected in the colon tumor.  The observed loss of MLH1 and PMS2 is due to somatic MLH1 promoter hypermethylation, the loss of MSH6 is attributed to somatic mutations in MSH6.  Ms. gArawal’s mother had two brothers and two sisters.  Ms. Agrawal’s maternal aunt  at age 83 from myeloma.  Ms. Agrawal’s maternal grandmother  at age 74 from colorectal cancer.        Ms. Agrawal’s father had prostate cancer, [place] score 3+4, at age 81 and melanoma skin cancer.  He has two sisters and four brothers.   Ms. Agrawal’s paternal aunt had breast cancer at age 57.  Two of Ms. Agrawal’s paternal uncles had melanoma.  Another paternal aunt had lung cancer in her late 50s.     Social History (historical data, reviewed by physician)   Denies tobacco use.    Current Medications   TAMOXIFEN 20 MG Oral Tab TAKE 1 TABLET DAILY 90 tablet 3    Meloxicam 7.5 MG Oral Tab Take 1 tablet (7.5 mg total) by mouth 2 (two) times daily as needed for Pain. 180 tablet 0    HYDROXYCHLOROQUINE  200 MG Oral Tab TAKE 1 TABLET TWICE A DAY (Patient taking differently: Take 1 tablet (200 mg total) by mouth nightly.) 180 tablet 3    albuterol 108 (90 Base) MCG/ACT Inhalation Aero Soln Inhale 2 puffs into the lungs every 6 (six) hours as needed for Wheezing. (Patient taking differently: Inhale 2 puffs into the lungs as needed for Wheezing.) 1 each 1    montelukast 10 MG Oral Tab Take 1 tablet (10 mg total) by mouth nightly. 90 tablet 3    cetirizine 10 MG Oral Tab Take 1 tablet (10 mg total) by mouth daily. 90 tablet 0    Misc Natural Products (TURMERIC CURCUMIN) Oral Cap Take 1,500 mg by mouth daily.      Omega-3 Fatty Acids (FISH OIL OR) Take 1,280 mg by mouth daily.      Calcium Carbonate-Vitamin D (CALCIUM 600 + D OR) Take 1 tablet by mouth daily.      Multiple Vitamin (MULTIVITAMIN ADULT OR) Take by mouth.      fluticasone propionate 50 MCG/ACT Nasal Suspension 2 sprays by Nasal route nightly. (Patient taking differently: 2 sprays by Nasal route as needed.) 48 mL 1    B Complex Vitamins (VITAMIN B COMPLEX 100 IJ)       Ergocalciferol (VITAMIN D OR) Take 2,000 Units by mouth daily.      Probiotic Product (PROBIOTIC DAILY OR) Take by mouth.       Allergies   Ms. Agrawal is allergic to seasonal.    Vital Signs   /82 (BP Location: Left arm, Patient Position: Sitting, Cuff Size: adult)   Pulse 60   Temp 98 °F (36.7 °C) (Temporal)   Resp 16   Ht 1.765 m (5' 9.49\")   Wt 77.6 kg (171 lb)   LMP 07/22/2020   SpO2 98%   BMI 24.90 kg/m²     Physical Examination   Constitutional      Well developed, well nourished. Appears close to chronological age. No apparent distress.   Head                   Normocephalic and atraumatic.  Eyes                   Conjunctiva clear; sclera anicteric.  ENMT                 External nose normal; external ears normal.  Neck                   Supple, without masses.  Hematologic/Lymphatic No cervical, supraclavicular, axillary lymphadenopathy.   Respiratory          Normal  effort; no respiratory distress; lungs clear to auscultation bilaterally.  Cardiovascular     Regular rate and rhythm.  Abdomen            Non-tender; non-distended; no masses,no fluid wave; no hepatosplenomegaly.  Extremities          No lower extremity edema.  Neurologic           Motor and sensory grossly intact.  Psychiatric          Mood and affect appropriate.    Laboratory   No results found for this or any previous visit (from the past 48 hours).    Radiology  [facility]  Department of Radiology  [address]  [place] [address] [phone]      Name: Yahaira Agrawal Dr: SHEA Jacques  : 1965    Age/Sex: 59 year old Female  Pt. Phone: [phone]  Exam Date: 2024  Procedure: MRI BREAST (W+WO) W/CAD BILAT (CPT=77049)   -----------------------------------------------------------------------------------------------------------------------------------------------                  SHEA Jacques  [address]   [address] [address]      Interpretation   PROCEDURE:  MRI BREAST (W+WO) W/CAD BILAT (CPT=77049)     COMPARISON:  MR CEDRIC, MRI BREAST (W+WO) W/CAD BILAT (CPT=77049), 10/10/2023, 5:23 PM.  US CEDRIC, US BREAST BILAT COMP([facility] SAME DAY US)(CPT=76641-50), 3/04/2024, 9:33 AM.  CEDRIC , [facility] RATNA 2D+3D DIAGNOSTIC CHRISTOPHER  BILAT (IMK=32950/64409),   3/04/2024, 9:02 AM.     INDICATIONS:  Z17.0 Malignant neoplasm of upper-inner quadrant of right breast in female, estrogen receptor positive (HCC) C50.211 Malignant neoplasm of upper-inner quadrant*     TECHNIQUE:  The breasts were imaged using dynamic intravenous gadolinium infusion, thin sections, and a dedicated breast coil.  Bilateral pre-contrast axial 2D/3D T2 weighted and 3D T1 weighted VIBRANT sequences were obtained with and without fat   suppression. Post contrast VIBRANT 3D T1 weighted images after IV gadolinium were obtained. 1.6 mm slice reconstruction of 3D  data sets with additional maximum intensity projects, subtraction, graphic, and kinetic analysis were performed from source   data.     PATIENT STATED HISTORY:(As transcribed by Technologist)  Annual follow-up      CONTRAST USED:  15 mL of Dotarem     FINDINGS:  Breast composition:  Extremely dense, which lowers the sensitivity of mammography.  SCREENING MRI OF THE BREAST IN ONE YEAR    Right breast:  There are post lumpectomy changes in the posterior medial right breast.  No suspicious enhancement at the surgical site or in the remainder of the right breast.  No enlarged right axillary or internal mammary adenopathy.     Left breast:  No suspicious enhancement in the left breast.  No enlarged left axillary or internal mammary adenopathy.                   =====  CONCLUSION:  Post lumpectomy changes right breast.  No suspicious enhancement in either breast.     Patient is due for annual mammogram in March 2025.        BI-RADS CATEGORY:  DIAGNOSTIC CATEGORY 2 - BENIGN FINDING:       RECOMMENDATIONS:       PLEASE NOTE:  A NORMAL MRI DOES NOT EXCLUDE THE POSSIBILITY OF BREAST CANCER.  A CLINICALLY SUSPICIOUS PALPABLE LUMP SHOULD BE BIOPSIED.  CORRELATION WITH CLINICAL EXAM AND OTHER IMAGING STUDIES IS RECOMMENDED.        LOCATION:  [facility]        Dictated by (CST): Nora Truong MD on 9/10/2024 at 9:01 AM       Finalized by (CST): Nora Truong MD on 9/10/2024 at 9:07 AM        Impression and Plan   1.   Invasive ductal carcinoma, right breast: Patient was staged as [id number]. Her disease was estrogen receptor and progesterone receptor positive and Her2 negative. Her OncotypeDX score was 28.  She underwent lumpectomy with sentinel lymph node biopsy, adjuvant chemotherapy and adjuvant radiation therapy. In 12/2020 she began adjuvant endocrine therapy with tamoxifen.           Continue tamoxifen without modification. She will complete 5 years of therapy in 12/2025.          Breast imaging is managed by Dr. Hill.      Planned Follow Up   Patient will return for follow up in 6 months.    Electronically signed by:    Farhan Joaquin M.D.  System Medical Director of Oncology Services  [facility]

## 2024-11-07 ENCOUNTER — OFFICE VISIT (OUTPATIENT)
Dept: HEMATOLOGY/ONCOLOGY | Facility: HOSPITAL | Age: 59
End: 2024-11-07
Attending: SPECIALIST
Payer: COMMERCIAL

## 2024-11-07 VITALS
SYSTOLIC BLOOD PRESSURE: 144 MMHG | RESPIRATION RATE: 16 BRPM | HEART RATE: 60 BPM | TEMPERATURE: 98 F | WEIGHT: 171 LBS | HEIGHT: 69.49 IN | OXYGEN SATURATION: 98 % | DIASTOLIC BLOOD PRESSURE: 82 MMHG | BODY MASS INDEX: 24.76 KG/M2

## 2024-11-07 DIAGNOSIS — C50.211 MALIGNANT NEOPLASM OF UPPER-INNER QUADRANT OF RIGHT BREAST IN FEMALE, ESTROGEN RECEPTOR POSITIVE (HCC): Primary | ICD-10-CM

## 2024-11-07 DIAGNOSIS — Z79.810 LONG-TERM CURRENT USE OF TAMOXIFEN: ICD-10-CM

## 2024-11-07 DIAGNOSIS — Z17.0 MALIGNANT NEOPLASM OF UPPER-INNER QUADRANT OF RIGHT BREAST IN FEMALE, ESTROGEN RECEPTOR POSITIVE (HCC): Primary | ICD-10-CM

## 2024-11-07 PROCEDURE — 99214 OFFICE O/P EST MOD 30 MIN: CPT | Performed by: SPECIALIST

## 2024-11-07 NOTE — PROGRESS NOTES
Pt here for f/u regarding breast cancer. Pt is complaint with Tamoxifen, hot flashes are manageable. Up to date with imaging, she is established with Dr. Hill.         Education Record    Learner:  Patient    Disease / Diagnosis: breast cancer    Barriers / Limitations:  None   Comments:    Method:  Discussion   Comments:    General Topics:  Medication, Side effects and symptom management, and Plan of care reviewed   Comments:    Outcome:  Observed demonstration and Shows understanding   Comments:

## 2024-12-09 RX ORDER — MONTELUKAST SODIUM 10 MG/1
10 TABLET ORAL NIGHTLY
Qty: 90 TABLET | Refills: 3 | Status: SHIPPED | OUTPATIENT
Start: 2024-12-09

## 2024-12-10 ENCOUNTER — OFFICE VISIT (OUTPATIENT)
Dept: FAMILY MEDICINE CLINIC | Facility: CLINIC | Age: 59
End: 2024-12-10
Payer: COMMERCIAL

## 2024-12-10 ENCOUNTER — HOSPITAL ENCOUNTER (OUTPATIENT)
Dept: GENERAL RADIOLOGY | Age: 59
Discharge: HOME | End: 2024-12-10
Attending: FAMILY MEDICINE
Payer: COMMERCIAL

## 2024-12-10 VITALS
DIASTOLIC BLOOD PRESSURE: 78 MMHG | BODY MASS INDEX: 25 KG/M2 | RESPIRATION RATE: 16 BRPM | OXYGEN SATURATION: 98 % | TEMPERATURE: 98 F | HEART RATE: 83 BPM | WEIGHT: 172 LBS | SYSTOLIC BLOOD PRESSURE: 120 MMHG

## 2024-12-10 DIAGNOSIS — J42 CHRONIC BRONCHITIS, UNSPECIFIED CHRONIC BRONCHITIS TYPE (HCC): ICD-10-CM

## 2024-12-10 DIAGNOSIS — J42 CHRONIC BRONCHITIS, UNSPECIFIED CHRONIC BRONCHITIS TYPE (HCC): Primary | ICD-10-CM

## 2024-12-10 DIAGNOSIS — M05.772 RHEUMATOID ARTHRITIS INVOLVING BOTH FEET WITH POSITIVE RHEUMATOID FACTOR (HCC): ICD-10-CM

## 2024-12-10 DIAGNOSIS — D84.9 IMMUNOCOMPROMISED (HCC): ICD-10-CM

## 2024-12-10 DIAGNOSIS — J45.20 MILD INTERMITTENT ASTHMA WITHOUT COMPLICATION (HCC): ICD-10-CM

## 2024-12-10 DIAGNOSIS — M05.771 RHEUMATOID ARTHRITIS INVOLVING BOTH FEET WITH POSITIVE RHEUMATOID FACTOR (HCC): ICD-10-CM

## 2024-12-10 PROCEDURE — 87637 SARSCOV2&INF A&B&RSV AMP PRB: CPT | Performed by: FAMILY MEDICINE

## 2024-12-10 PROCEDURE — 99214 OFFICE O/P EST MOD 30 MIN: CPT | Performed by: FAMILY MEDICINE

## 2024-12-10 PROCEDURE — 71046 X-RAY EXAM CHEST 2 VIEWS: CPT | Performed by: FAMILY MEDICINE

## 2024-12-10 RX ORDER — AZITHROMYCIN 250 MG/1
TABLET, FILM COATED ORAL
Qty: 6 TABLET | Refills: 0 | Status: SHIPPED | OUTPATIENT
Start: 2024-12-10 | End: 2024-12-15

## 2024-12-10 RX ORDER — FLUTICASONE PROPIONATE AND SALMETEROL 250; 50 UG/1; UG/1
1 POWDER RESPIRATORY (INHALATION) 2 TIMES DAILY
Qty: 3 EACH | Refills: 0 | Status: SHIPPED | OUTPATIENT
Start: 2024-12-10

## 2024-12-10 RX ORDER — BENZONATATE 100 MG/1
CAPSULE ORAL
COMMUNITY
Start: 2024-11-24

## 2024-12-10 NOTE — PROGRESS NOTES
HPI:   Yahaira Agrawal is a 59 year old female who presents chronic cough     Pt started to feel sick 10/26 - treated for a sinus infection and was mostly better after augmentin   Not completely better   Pt started to feel again and went to  11/18  - pt was doxycycline and prednisone / albuterol 11/24  Pt felt mostly better - sinuses better / less of a cough   Pt started to feel worse over the weekend   Pt feels her asthma is flared   Pt feels a \"rattle\" when she coughs   No flu shot   No covid testing   + ear and sinus pressure   Drainage in throat   Pt feels it is starting again   Feels sick   No fevers     Pt under treatment for RA   H/o breast CA on tamoxifen     On singulair and zyrtec     Current Outpatient Medications   Medication Sig Dispense Refill    benzonatate 100 MG Oral Cap SWALLOW WHOLE TAKE 1 CAPSULE 3 TIMES A DAY AS NEEDED *DO NOT BREAK CHEW, DISSOLVE, CUT, OR CRUSH*      MONTELUKAST 10 MG Oral Tab TAKE 1 TABLET NIGHTLY 90 tablet 3    TAMOXIFEN 20 MG Oral Tab TAKE 1 TABLET DAILY 90 tablet 3    Meloxicam 7.5 MG Oral Tab Take 1 tablet (7.5 mg total) by mouth 2 (two) times daily as needed for Pain. 180 tablet 0    HYDROXYCHLOROQUINE 200 MG Oral Tab TAKE 1 TABLET TWICE A DAY (Patient taking differently: Take 1 tablet (200 mg total) by mouth nightly.) 180 tablet 3    albuterol 108 (90 Base) MCG/ACT Inhalation Aero Soln Inhale 2 puffs into the lungs every 6 (six) hours as needed for Wheezing. (Patient taking differently: Inhale 2 puffs into the lungs as needed for Wheezing.) 1 each 1    cetirizine 10 MG Oral Tab Take 1 tablet (10 mg total) by mouth daily. 90 tablet 0    Misc Natural Products (TURMERIC CURCUMIN) Oral Cap Take 1,500 mg by mouth daily.      Omega-3 Fatty Acids (FISH OIL OR) Take 1,280 mg by mouth daily.      Calcium Carbonate-Vitamin D (CALCIUM 600 + D OR) Take 1 tablet by mouth daily.      Multiple Vitamin (MULTIVITAMIN ADULT OR) Take by mouth.      fluticasone propionate 50 MCG/ACT  Nasal Suspension 2 sprays by Nasal route nightly. (Patient taking differently: 2 sprays by Nasal route as needed.) 48 mL 1    B Complex Vitamins (VITAMIN B COMPLEX 100 IJ)       Ergocalciferol (VITAMIN D OR) Take 2,000 Units by mouth daily.      Probiotic Product (PROBIOTIC DAILY OR) Take by mouth.        Past Medical History:    Allergic rhinitis due to pollen    Skin tests 2013- allergic to mold, ragweed, grass, cats, pollen    Asthma (HCC)    allergy/exercise-induced    Bell's palsy    Breast CA (HCC)    55yrs old (invasive ductal).    Breast cancer in female (HCC)    Right Breast IDC    Ductal carcinoma in situ of breast    right    Easy bruising    Extrinsic asthma, unspecified    Migraines    Rheumatoid arthritis (HCC)    Schmorl's node    neck    Visual impairment    READERS      Past Surgical History:   Procedure Laterality Date    Breast biopsy Right 07/15/2020    Chemotherapy      chemo after lumpectomy 10/2020-12/2020    Colonoscopy  05/12/2021    Dental surgery procedure  11/1/08    Implants    Lumpectomy right  08/13/2020    Other      vein treatment    Radiation right      4wks Dec 2020-Jan 2021    Tonsillectomy        Family History   Problem Relation Age of Onset    DCIS Self 53    Breast Cancer Self 55        IDC    Colon Cancer Mother 77    Other (Malt Lymphoma (Cancer)) Mother 73        oral cancer;undergoing treatment    Hypertension Father     Prostate Cancer Father 70    Thyroid disease Sister     No Known Problems Brother     No Known Problems Brother     Allergies Daughter     Anxiety Son     Depression Son     No Known Problems Son     Diabetes Maternal Grandmother     Colon Cancer Maternal Grandmother         Liver and Pancreatic CA, unknown age for dx    Diabetes Maternal Grandfather     Other (Multiple Myeloma) Maternal Aunt         unknown age at dx    Breast Cancer Paternal Aunt 57    Other (Bone Cancer) Paternal Aunt 83    Other (Brain Cancer) Paternal Aunt 64    Other (Lung Cancer)  Paternal Aunt 59      Social History:   Social History     Socioeconomic History    Marital status:     Number of children: 3   Occupational History    Occupation: Homemaker    Occupation: marketing    Occupation: development   Tobacco Use    Smoking status: Never    Smokeless tobacco: Never   Vaping Use    Vaping status: Never Used   Substance and Sexual Activity    Alcohol use: Yes     Alcohol/week: 1.0 - 4.0 standard drink of alcohol     Types: 1 - 4 Glasses of wine per week     Comment: Socially    Drug use: No   Other Topics Concern    Caffeine Concern Yes     Comment: 1 a day    Exercise Yes     Comment: 2-3 times/week    Seat Belt Yes     Social Drivers of Health      [facility]    Social Connections    [facility]    Housing Stability     Occ:  : . 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  Diet: watches calories closely     REVIEW OF SYSTEMS:   GENERAL: feels well otherwise  SKIN: denies any unusual skin lesions  EYES:denies blurred vision or double vision  HEENT: denies nasal congestion, sinus pain or ST  LUNGS: denies shortness of breath with exertion  CARDIOVASCULAR: denies chest pain on exertion  GI: denies abdominal pain,denies heartburn  : denies dysuria, vaginal discharge or itching  MUSCULOSKELETAL: denies back pain  NEURO: denies headaches  PSYCHE: denies depression or anxiety  HEMATOLOGIC: denies hx of anemia  ENDOCRINE: denies thyroid history  ALL/ASTHMA: asthma    EXAM:   /78   Pulse 83   Temp 97.8 °F (36.6 °C) (Oral)   Resp 16   Wt 172 lb (78 kg)   LMP 07/22/2020   SpO2 98%   BMI 25.04 kg/m²   Body mass index is 25.04 kg/m².   GENERAL: alert and oriented X 3, well developed, well nourished,in no apparent distress   EXTREMITIES: no cyanosis, clubbing or edema  NEURO: cranial nerves are intact,motor  and sensory are grossly intact  HEART: normal   LUNGS: slight course throughout   NECK: supple no LAD   HEENT: TM's effusion / OP clear /  no sinus pain     ASSESSMENT AND PLAN:   Yahaira Agrawal is a 59 year old female who presents with     1. Chronic bronchitis, unspecified chronic bronchitis type (HCC)    - XR CHEST PA + LAT CHEST (CPT=71046); Future  - azithromycin (ZITHROMAX Z-DAVE) 250 MG Oral Tab; Take 2 tablets (500 mg total) by mouth daily for 1 day, THEN 1 tablet (250 mg total) daily for 4 days.  Dispense: 6 tablet; Refill: 0  - fluticasone-salmeterol 250-50 MCG/ACT Inhalation Aerosol Powder, Breath Activated; Inhale 1 puff into the lungs 2 (two) times daily.  Dispense: 3 each; Refill: 0  - SARS-CoV-2/Flu A and B/RSV by PCR ([facility]) [E]; Future  - SARS-CoV-2/Flu A and B/RSV by PCR ([facility]) [E]    2. Immunocompromised (HCC)    - XR CHEST PA + LAT CHEST (CPT=71046); Future  - azithromycin (ZITHROMAX Z-DAVE) 250 MG Oral Tab; Take 2 tablets (500 mg total) by mouth daily for 1 day, THEN 1 tablet (250 mg total) daily for 4 days.  Dispense: 6 tablet; Refill: 0  - fluticasone-salmeterol 250-50 MCG/ACT Inhalation Aerosol Powder, Breath Activated; Inhale 1 puff into the lungs 2 (two) times daily.  Dispense: 3 each; Refill: 0    3. Rheumatoid arthritis involving both feet with positive rheumatoid factor (HCC)    - XR CHEST PA + LAT CHEST (CPT=71046); Future  - azithromycin (ZITHROMAX Z-DAVE) 250 MG Oral Tab; Take 2 tablets (500 mg total) by mouth daily for 1 day, THEN 1 tablet (250 mg total) daily for 4 days.  Dispense: 6 tablet; Refill: 0  - fluticasone-salmeterol 250-50 MCG/ACT Inhalation Aerosol Powder, Breath Activated; Inhale 1 puff into the lungs 2 (two) times daily.  Dispense: 3 each; Refill: 0    4. Mild intermittent asthma without complication (HCC)    - XR CHEST PA + LAT CHEST (CPT=71046); Future  - azithromycin (ZITHROMAX Z-DAVE) 250 MG Oral Tab; Take 2 tablets (500 mg total) by mouth daily for 1 day,  THEN 1 tablet (250 mg total) daily for 4 days.  Dispense: 6 tablet; Refill: 0  - fluticasone-salmeterol 250-50 MCG/ACT Inhalation Aerosol Powder, Breath Activated; Inhale 1 puff into the lungs 2 (two) times daily.  Dispense: 3 each; Refill: 0    Questions answered and patient indicates understanding of these issues and agrees to the plan.  Follow up in december 1-2 weeks or sooner if needed.

## 2024-12-11 ENCOUNTER — TELEPHONE (OUTPATIENT)
Dept: FAMILY MEDICINE CLINIC | Facility: CLINIC | Age: 59
End: 2024-12-11

## 2024-12-11 DIAGNOSIS — J21.0 RSV (ACUTE BRONCHIOLITIS DUE TO RESPIRATORY SYNCYTIAL VIRUS): Primary | ICD-10-CM

## 2024-12-11 LAB
FLUAV + FLUBV RNA SPEC NAA+PROBE: NOT DETECTED
FLUAV + FLUBV RNA SPEC NAA+PROBE: NOT DETECTED
RSV RNA SPEC NAA+PROBE: DETECTED
SARS-COV-2 RNA RESP QL NAA+PROBE: NOT DETECTED

## 2024-12-11 NOTE — TELEPHONE ENCOUNTER
Patient called about RSV positive result    -wants to know if she needs to follow a certain course of treatment with this diagnosis  -was inquiring about steroids.   -says she's been sick for 6wks & wants to know how long she's contagious for.     Please advise. Says you can call or reach out via [facility]

## 2024-12-12 RX ORDER — GUAIFENESIN AND CODEINE PHOSPHATE 100; 10 MG/5ML; MG/5ML
10 SOLUTION ORAL NIGHTLY PRN
Qty: 118 ML | Refills: 0 | Status: SHIPPED | OUTPATIENT
Start: 2024-12-12

## 2024-12-12 NOTE — TELEPHONE ENCOUNTER
At OV it appears this is possibly the third illness  Pt needs to wear a mask while she is not feeling well to protect her and others  Steroids again would necessitate it being a longer course due to having had them recently

## 2024-12-12 NOTE — TELEPHONE ENCOUNTER
Dr. Fabian,  Called patient - long discussion on illness. Her face hurts, teeth hurt, coughing so bad she has to pee. Cough is worse at night. No nasal drainage, no sputum. She is taking Z-violetta. She is using a humidifier. I recommended Advil cold and sinus to open up sinuses. Can she have a cough medication? She has a call into [facility] to see if she can hold Plaquenil. Also, do you recommend RSV vaccine when she is better?

## 2024-12-27 ENCOUNTER — TELEPHONE (OUTPATIENT)
Dept: FAMILY MEDICINE CLINIC | Facility: CLINIC | Age: 59
End: 2024-12-27

## 2024-12-27 NOTE — TELEPHONE ENCOUNTER
Pt called back.  Discussed below. She is in agreement.  Info given for ENT  She voiced understanding

## 2024-12-27 NOTE — TELEPHONE ENCOUNTER
Has been sick since October with RSV and sinus.  She got better but her sinuses are filling back up now.  Wants to get a scan done of her sinuses

## 2025-01-02 ENCOUNTER — OFFICE VISIT (OUTPATIENT)
Facility: LOCATION | Age: 60
End: 2025-01-02
Payer: COMMERCIAL

## 2025-01-02 DIAGNOSIS — J32.4 CHRONIC PANSINUSITIS: Primary | ICD-10-CM

## 2025-01-02 DIAGNOSIS — J30.1 SEASONAL ALLERGIC RHINITIS DUE TO POLLEN: ICD-10-CM

## 2025-01-02 PROCEDURE — 99204 OFFICE O/P NEW MOD 45 MIN: CPT | Performed by: OTOLARYNGOLOGY

## 2025-01-02 PROCEDURE — 77011 CT SCAN FOR LOCALIZATION: CPT | Performed by: OTOLARYNGOLOGY

## 2025-01-02 RX ORDER — CEFUROXIME AXETIL 250 MG/1
250 TABLET ORAL 2 TIMES DAILY
Qty: 40 TABLET | Refills: 0 | Status: SHIPPED | OUTPATIENT
Start: 2025-01-02

## 2025-01-02 RX ORDER — PREDNISONE 5 MG/1
5 TABLET ORAL DAILY
Qty: 20 TABLET | Refills: 0 | Status: SHIPPED | OUTPATIENT
Start: 2025-01-02

## 2025-01-02 NOTE — PROGRESS NOTES
Yahaira Agrawal is a 59 year old female. No chief complaint on file.    HPI:   She has a history of chronic sinusitis.  She has had sinus problems for quite some time.  She has had a chronic sinus infection since October.  She has been on 3 rounds of antibiotic and steroid.  She has chronic sinus pain and pressure.  She also has a history rheumatoid arthritis.  She does have allergies.  She uses saline Flonase and Allegra.  Current Outpatient Medications   Medication Sig Dispense Refill    cefuroxime 250 MG Oral Tab Take 1 tablet (250 mg total) by mouth 2 (two) times daily. 40 tablet 0    predniSONE 5 MG Oral Tab Take 1 tablet (5 mg total) by mouth daily. 20 tablet 0    guaiFENesin-Codeine 200-20 MG/10ML Oral Solution Take 10 mL by mouth nightly as needed. 118 mL 0    benzonatate 100 MG Oral Cap SWALLOW WHOLE TAKE 1 CAPSULE 3 TIMES A DAY AS NEEDED *DO NOT BREAK CHEW, DISSOLVE, CUT, OR CRUSH*      fluticasone-salmeterol 250-50 MCG/ACT Inhalation Aerosol Powder, Breath Activated Inhale 1 puff into the lungs 2 (two) times daily. 3 each 0    MONTELUKAST 10 MG Oral Tab TAKE 1 TABLET NIGHTLY 90 tablet 3    TAMOXIFEN 20 MG Oral Tab TAKE 1 TABLET DAILY 90 tablet 3    Meloxicam 7.5 MG Oral Tab Take 1 tablet (7.5 mg total) by mouth 2 (two) times daily as needed for Pain. 180 tablet 0    HYDROXYCHLOROQUINE 200 MG Oral Tab TAKE 1 TABLET TWICE A DAY (Patient taking differently: Take 1 tablet (200 mg total) by mouth nightly.) 180 tablet 3    albuterol 108 (90 Base) MCG/ACT Inhalation Aero Soln Inhale 2 puffs into the lungs every 6 (six) hours as needed for Wheezing. (Patient taking differently: Inhale 2 puffs into the lungs as needed for Wheezing.) 1 each 1    cetirizine 10 MG Oral Tab Take 1 tablet (10 mg total) by mouth daily. 90 tablet 0    Misc Natural Products (TURMERIC CURCUMIN) Oral Cap Take 1,500 mg by mouth daily.      Omega-3 Fatty Acids (FISH OIL OR) Take 1,280 mg by mouth daily.      Calcium Carbonate-Vitamin D  (CALCIUM 600 + D OR) Take 1 tablet by mouth daily.      Multiple Vitamin (MULTIVITAMIN ADULT OR) Take by mouth.      fluticasone propionate 50 MCG/ACT Nasal Suspension 2 sprays by Nasal route nightly. (Patient taking differently: 2 sprays by Nasal route as needed.) 48 mL 1    B Complex Vitamins (VITAMIN B COMPLEX 100 IJ)       Ergocalciferol (VITAMIN D OR) Take 2,000 Units by mouth daily.      Probiotic Product (PROBIOTIC DAILY OR) Take by mouth.        Past Medical History:    Allergic rhinitis due to pollen    Skin tests 2013- allergic to mold, ragweed, grass, cats, pollen    Asthma (HCC)    allergy/exercise-induced    Bell's palsy    Breast CA ([facility])    55yrs old (invasive ductal).    Breast cancer in female ([facility])    Right Breast IDC    Ductal carcinoma in situ of breast    right    Easy bruising    Extrinsic asthma, unspecified    Migraines    Rheumatoid arthritis ([facility])    Schmorl's node    neck    Visual impairment    READERS      Social History:  Social History     Socioeconomic History    Marital status:     Number of children: 3   Occupational History    Occupation: Homemaker    Occupation: marketing    Occupation: development   Tobacco Use    Smoking status: Never    Smokeless tobacco: Never   Vaping Use    Vaping status: Never Used   Substance and Sexual Activity    Alcohol use: Yes     Alcohol/week: 1.0 - 4.0 standard drink of alcohol     Types: 1 - 4 Glasses of wine per week     Comment: Socially    Drug use: No   Other Topics Concern    Caffeine Concern Yes     Comment: 1 a day    Exercise Yes     Comment: 2-3 times/week    Seat Belt Yes     Social Drivers of Health      Received from [facility], [facility]    Social Connections    Received from [facility], [facility]    Housing Stability      Past Surgical History:   Procedure Laterality Date    Breast biopsy Right 07/15/2020    Chemotherapy      chemo after  lumpectomy 10/2020-12/2020    Colonoscopy  05/12/2021    Dental surgery procedure  11/1/08    Implants    Lumpectomy right  08/13/2020    Other      vein treatment    Radiation right      4wks Dec 2020-Jan 2021    Tonsillectomy           REVIEW OF SYSTEMS:   GENERAL HEALTH: feels well otherwise  GENERAL : denies fever, chills, sweats, weight loss, weight gain  SKIN: denies any unusual skin lesions or rashes  RESPIRATORY: denies shortness of breath with exertion  NEURO: denies headaches    EXAM:   LMP 07/22/2020     System Findings Details   Constitutional  Overall appearance - Normal.   Psychiatric  Orientation - Oriented to time, place, person & situation. Appropriate mood and affect.   Head/Face  Facial features -- Normal. Skull - Normal.   Eyes  Pupils equal ,round ,react to light and accomidate   Ears, Nose, Throat, Neck  Ears clear nose erythemic congestion with septal deviation oropharynx cobblestoning neck no masses   Neurological  Memory - Normal. Cranial nerves - Cranial nerves II through XII grossly intact.   Lymph Detail  Submental. Submandibular. Anterior cervical. Posterior cervical. Supraclavicular.     Clinical indication: Chronic sinusitis    Exam title: CT guidance stereotactic localization of sinuses    Technique: ASR on mini CAT was used with a sinus CT protocol.  The patient was positioned seated upright with the head aligned and supported with malar retention.  A  film was used to ensure proper targeting.  Volume CT data was acquired.  Multiplanar reconstruction was performed on a viewing work stand to obtain axial and coronal images.  Images were manipulated to adjust contrast for bone window viewing.    Scan time: 20 seconds    Peak voltage: 120 K     Tube current: 48.30 MAS    Comparison to prior CT scans: None    Findings:  Frontal sinuses are clear.  There is mucosal thickening and air-fluid level in the left maxillary sinus.  There is ostiomeatal unit obstruction bilaterally.  There  is mucosal thickening in the left ethmoid sinuses.  The sphenoid sinuses are clear.  There is deviated septum to the left along with turbinate hypertrophy  Diagnosis:  Chronic sinusitis with a air-fluid level in the left maxillary sinus along with deviated septum and turbinate hypertrophy  ASSESSMENT AND PLAN:   1. Chronic pansinusitis  She has chronic sinusitis with persistent left-sided sinusitis along with a deviated nasal septum.  She will not take 3 weeks of Ceftin.  She will add low-dose prednisone saline irrigations and Flonase.  She will then follow-up with me in 5 weeks.    2. Seasonal allergic rhinitis due to pollen        The patient indicates understanding of these issues and agrees to the plan.    No follow-ups on file.    Farshad Del Valle MD  1/2/2025  5:51 PM

## 2025-01-07 ENCOUNTER — OFFICE VISIT (OUTPATIENT)
Dept: INTERNAL MEDICINE CLINIC | Facility: CLINIC | Age: 60
End: 2025-01-07
Payer: COMMERCIAL

## 2025-01-07 VITALS
DIASTOLIC BLOOD PRESSURE: 82 MMHG | BODY MASS INDEX: 24.48 KG/M2 | RESPIRATION RATE: 18 BRPM | WEIGHT: 171 LBS | SYSTOLIC BLOOD PRESSURE: 126 MMHG | HEART RATE: 85 BPM | HEIGHT: 70 IN

## 2025-01-07 DIAGNOSIS — R73.9 BLOOD GLUCOSE ELEVATED: ICD-10-CM

## 2025-01-07 DIAGNOSIS — M05.771 RHEUMATOID ARTHRITIS INVOLVING BOTH FEET WITH POSITIVE RHEUMATOID FACTOR (HCC): Primary | ICD-10-CM

## 2025-01-07 DIAGNOSIS — Z86.2 HISTORY OF ANEMIA: ICD-10-CM

## 2025-01-07 DIAGNOSIS — R14.0 ABDOMINAL BLOATING: ICD-10-CM

## 2025-01-07 DIAGNOSIS — M05.772 RHEUMATOID ARTHRITIS INVOLVING BOTH FEET WITH POSITIVE RHEUMATOID FACTOR (HCC): Primary | ICD-10-CM

## 2025-01-07 DIAGNOSIS — Z51.81 THERAPEUTIC DRUG MONITORING: ICD-10-CM

## 2025-01-07 PROCEDURE — 99204 OFFICE O/P NEW MOD 45 MIN: CPT | Performed by: INTERNAL MEDICINE

## 2025-01-07 NOTE — PROGRESS NOTES
HISTORY OF PRESENT ILLNESS  Chief Complaint   Patient presents with    Weight Problem     Referred by Pcp, Haven't tried any medications, diets don't work since having cancer in 2020       Yahaira Agrawal is a 59 year old female new to our office today for initiation of medical weight loss program.  Patient presents today with c/o excess weight.     In 2022, 146-148 lb felt her best and felt that she could eat very healthy / exercise and been consistent with her physical activity   Since finding out cancer treatment and hormones and felt she has consistently gained   Tried noom and WW, tried different workouts.   Has tried many different options     Currently on antibiotics and steroids and sinus surgery.       Wt Readings from Last 6 Encounters:   01/07/25 171 lb (77.6 kg)   12/10/24 172 lb (78 kg)   11/07/24 171 lb (77.6 kg)   10/31/24 168 lb (76.2 kg)   09/02/24 172 lb (78 kg)   08/17/24 168 lb (76.2 kg)              C Initial Intake    General Information  Patient stated 6 month goal: I would like to lose 8-10 pounds to get to   160 or 158. Im just not able to get past my plateau of 168. Gained weight   as a result of tamoxefin. Nothing seems to work. Deepa Rodriguez, [employer], exercise.   Patient stated 1 year goal: Maintain new weight. Continue exercising and   healthy habits.   Patient stated readiness to make a Lifestyle Change (0 = no desire; 10 =   extremely motivated): 10 Patient stated willingness to take medication(s)   for weight loss (0 = no interest; 10 = ready to start): 1 Patient stated   level of interest in bariatric surgery (0 = not interested; 10 = very   interested): 0   Patient agreement to achieve  weight loss: Show up for scheduled follow-up   appointments, timely complete tests as discussed and ordered (labs,   cardiac testing, sleep study and/or imaging), work toward goal of   exercising 30 minutes 5 days, 150 minutes/week, make an appointment with   Contreras Damon dietician to  assist in making dietary changes, take   medication as prescribed and contact clinic if any questions or concerns   regarding medication, reach the goal of losing 5-10% of total body weight   within 6 months of consultation, ask for clarification, help or support   when needed and give myself credit for my accomplishments and patience   during this process.: I agree  Previous Weight Loss  Referral source to [facility]: Specialist  Patient stated previous weight loss history: No medications for weight   loss  Patient stated eating behaviors/patterns that have been barriers to weight   loss success in the past: Having a glass of wine or snack at night.  24 Hour Food Journal  Breakfast: Cottage cheese and fruit, coffee Lunch: Salad with protein or   protein and veggies   Dinner: Protein, veggies and sometimes a starch Snacks: Fruit, skinny pop,   greek yogurt   Fluids: Tons of water all day & at night, coffee in am, sometimes a glass   of wine at night (maybe twice week).    Lifestyle   Patient stated use of tobacco: No Patient stated # of alcoholic beverages   per week: 2   Patient stated supplements taken on a regular basis include: Vitamins,   probiotic   Exercise   Patient stated exercises # days/week: 3 Patient stated perceived level of   exertion: 3 Patient stated average level of stress: 7   Patient stated activities: Strength training, walking   Patient stated coping strategies: Sleep or a Glass of wine   Sleep   Patient stated # hours uninterrupted sleep: 8 Patient stated # times   awakened: 2 Patient stated feels restful: Yes   Patient stated snores: No Patient stated has sleep apnea: No Patient   stated uses sleep device: None                Breakfast Lunch Dinner Snacks Fluids   Reviewed 24 dietary recall   Tries to meal prep most days    Salad / protein   Meal prep chicken / broccoli      Does great with hydration        Social hx and lifestyle reviewed:    Work:  marketing and  communications for [employer]   Marital status: yes, 3 kids   Support: good   Tobacco use: negative   ETOH use: 2/ per week   Supplements: probiotics/ MV   Vit D   VIt B  Probiotic   Tumeric   Allergy pill   Singulair   Nasal spray   Calcium     Exercise: 3 days per week   Stress level: /10  Sleep hours and integrity: 8 hours     MEDICAL HISTORY  PMH reviewed:   Cardiac disorders:negative    Depression/anxiety: negative   Glaucoma: negative   Kidney stones: negative   Eating disorder: negative   Migraines: negative   Seizures: negative   Joint-related conditions:negative   Liver disease: negative   Renal disease: negative   Diabetes: negative  Thyroid disease: negative   Constipation: negative  DVT: negative    Family or personal history of Pancreatic issues / Medullary Thyroid Cancer: negative    History of bariatric surgery: negative     FMH reviewed: obesity in parent/s or sibling: YES     REVIEW OF SYSTEMS  GENERAL: feels well otherwise,   NECK: denies thickening   LUNGS: denies shortness of breath with exertion, no apnea   CARDIOVASCULAR: denies chest pain on exertion , denies palpitations or pedal edema  GI: denies abdominal pain.  No N/V/D/C  MUSCULOSKELETAL: denies joint pains   NEURO: denies headaches   PSYCH: denies change in behavior or mood, denies feeling sad or depressed     EXAM  /82   Pulse 85   Resp 18   Ht 5' 10\" (1.778 m)   Wt 171 lb (77.6 kg)   LMP 07/22/2020   BMI 24.54 kg/m² ,            GENERAL: well developed, well nourished, in no apparent distress  SKIN: warm, pink, dry without rashes to exposed area   EYES: conjunctiva pink, sclera non icteric, PERRL  HEENT: atraumatic, normocephalic, O/p: Mallampati score- 2  NECK: supple, non tender, no adenopathy, no thyromegaly,   LUNGS: CTA in all fields, breathing non labored  CARDIO: RRR without murmur, normal S1 and S2 without clicks or gallops, no pedal edema.GI: rotund, no masses, HSM or tenderness  MUSCULOSKELETAL: grossly intact    NEURO: Oriented times three, full ROM of bilateral UE/LE  PSYCH: pleasant, cooperative, normal mood and affect,     Lab Results   Component Value Date    WBC 4.5 07/26/2024    RBC 4.26 07/26/2024    HGB 13.4 07/26/2024    HCT 38.8 07/26/2024    MCV 91.1 07/26/2024    MCH 31.5 07/26/2024    MCHC 34.5 07/26/2024    RDW 12.3 07/26/2024    .0 07/26/2024     Lab Results   Component Value Date    GLU 94 07/26/2024    BUN 14 07/26/2024    BUNCREA NOT APPLICABLE 12/02/2022    CREATSERUM 0.79 07/26/2024    ANIONGAP 5 07/26/2024    GFRNAA 90 12/21/2021    GFRAA 105 12/21/2021    CA 9.6 07/26/2024    OSMOCALC 290 07/26/2024    ALKPHO 43 (L) 07/26/2024    AST 30 07/26/2024    ALT 16 07/26/2024    BILT 0.5 07/26/2024    TP 7.2 07/26/2024    ALB 4.4 07/26/2024    GLOBULIN 2.8 07/26/2024    AGRATIO 1.3 12/02/2022     07/26/2024    K 3.9 07/26/2024     07/26/2024    CO2 28.0 07/26/2024     Lab Results   Component Value Date     12/14/2023    A1C 5.4 12/14/2023     Lab Results   Component Value Date    CHOLEST 151 12/14/2023    TRIG 69 12/14/2023    HDL 96 (H) 12/14/2023    LDL 41 12/14/2023    VLDL 10 12/14/2023    TCHDLRATIO 2.2 12/02/2022    NONHDLC 55 12/14/2023     Lab Results   Component Value Date    T4F 1.0 12/14/2023    TSH 0.730 12/14/2023    TSHT4 1.04 03/13/2020     Lab Results   Component Value Date    B12 636 12/14/2023    VITB12 412 12/02/2022     Lab Results   Component Value Date    VITD 57.3 12/14/2023       Medications Ordered Prior to Encounter[1]    ASSESSMENT  Initial Weight Data and Goal Weight Loss:  Weight Calculations  Initial Weight: 171 lbs  Initial Weight Date: 01/07/25  Today's Weight: 171 lbs  5% Goal: 8.55  10% Goal: 17.1  Total Weight Loss: 0 lbs    Diagnoses and all orders for this visit:    Rheumatoid arthritis involving both feet with positive rheumatoid factor (HCC)  -     Ferritin; Future  -     Iron And Tibc; Future  -     Hemoglobin A1C; Future  -     B12 AND  FOLATE; Future  -     Vitamin D; Future  -     TSH and Free T4; Future  -     Comp Metabolic Panel (14); Future  -     Insulin [E]; Future  -     Lipid Panel; Future  -     CBC With Differential With Platelet; Future  -     Sed Rate, Westergren (Automated) [E]; Future  -     C-RP/High Sensitivity [E]; Future  -     Cancel: ELECTROCARDIOGRAM, [facility]  -     EKG 12 Lead performed at [facility]; Future    Therapeutic drug monitoring  -     Ferritin; Future  -     Iron And Tibc; Future  -     Hemoglobin A1C; Future  -     B12 AND FOLATE; Future  -     Vitamin D; Future  -     TSH and Free T4; Future  -     Comp Metabolic Panel (14); Future  -     Insulin [E]; Future  -     Lipid Panel; Future  -     CBC With Differential With Platelet; Future  -     Sed Rate, Westergren (Automated) [E]; Future  -     C-RP/High Sensitivity [E]; Future  -     Cancel: ELECTROCARDIOGRAM, [facility]  -     EKG 12 Lead performed at [facility]; Future    Blood glucose elevated  -     Ferritin; Future  -     Iron And Tibc; Future  -     Hemoglobin A1C; Future  -     B12 AND FOLATE; Future  -     Vitamin D; Future  -     TSH and Free T4; Future  -     Comp Metabolic Panel (14); Future  -     Insulin [E]; Future  -     Lipid Panel; Future  -     CBC With Differential With Platelet; Future  -     Sed Rate, Westergren (Automated) [E]; Future  -     C-RP/High Sensitivity [E]; Future  -     Cancel: ELECTROCARDIOGRAM, [facility]  -     EKG 12 Lead performed at [facility]; Future    Abdominal bloating  -     Ferritin; Future  -     Iron And Tibc; Future  -     Hemoglobin A1C; Future  -     B12 AND FOLATE; Future  -     Vitamin D; Future  -     TSH and Free T4;  Future  -     Comp Metabolic Panel (14); Future  -     Insulin [E]; Future  -     Lipid Panel; Future  -     CBC With Differential With Platelet; Future  -     Sed Rate, Westergren (Automated) [E]; Future  -     C-RP/High Sensitivity [E]; Future  -     Cancel: ELECTROCARDIOGRAM, COMPLETE  -     [facility] Medical Nutrition Therapy DIETITIAN [facility]  -     EKG 12 Lead performed at [facility]; Future    History of anemia  -     Ferritin; Future  -     Iron And Tibc; Future  -     Hemoglobin A1C; Future  -     B12 AND FOLATE; Future  -     Vitamin D; Future  -     TSH and Free T4; Future  -     Comp Metabolic Panel (14); Future  -     Insulin [E]; Future  -     Lipid Panel; Future  -     CBC With Differential With Platelet; Future  -     Sed Rate, Westergren (Automated) [E]; Future  -     C-RP/High Sensitivity [E]; Future  -     Cancel: ELECTROCARDIOGRAM, COMPLETE  -     [facility] Weight [facility] Nutrition Therapy DIETITIAN [facility] Location  -     EKG 12 Lead performed at [facility]; Future        PLAN     Body mass index is 24.54 kg/m².  Reviewed weight gain and baseline weight prior to Tamoxifen and COVID   Trial of lomaira  -advised of side effects and adverse effects of this medication  Check baseline ecg-pending   Reviewed baseline labwork and review for causes of weight gain   Check thyroid   Check for anemia   Medication use and side effects reviewed with patient.  Medication contraindications:n/a     Follow up with dietitian and psychologist as recommended.  Discussed the role of sleep and stress in weight management.  Labs orders as above.  Counseled on comprehensive weight loss plan including attention to nutrition, exercise and behavior/stress management for success. See patient instruction below for more details.  Weight Loss Consent to treat reviewed and signed.    There are no Patient Instructions on file for this visit.    No follow-ups on  file.    Patient verbalizes understanding.    Ju Clarke MD           [1]   Current Outpatient Medications on File Prior to Visit   Medication Sig Dispense Refill    cefuroxime 250 MG Oral Tab Take 1 tablet (250 mg total) by mouth 2 (two) times daily. 40 tablet 0    predniSONE 5 MG Oral Tab Take 1 tablet (5 mg total) by mouth daily. 20 tablet 0    fluticasone-salmeterol 250-50 MCG/ACT Inhalation Aerosol Powder, Breath Activated Inhale 1 puff into the lungs 2 (two) times daily. 3 each 0    MONTELUKAST 10 MG Oral Tab TAKE 1 TABLET NIGHTLY 90 tablet 3    TAMOXIFEN 20 MG Oral Tab TAKE 1 TABLET DAILY 90 tablet 3    Meloxicam 7.5 MG Oral Tab Take 1 tablet (7.5 mg total) by mouth 2 (two) times daily as needed for Pain. 180 tablet 0    HYDROXYCHLOROQUINE 200 MG Oral Tab TAKE 1 TABLET TWICE A DAY (Patient taking differently: Take 1 tablet (200 mg total) by mouth nightly.) 180 tablet 3    albuterol 108 (90 Base) MCG/ACT Inhalation Aero Soln Inhale 2 puffs into the lungs every 6 (six) hours as needed for Wheezing. (Patient taking differently: Inhale 2 puffs into the lungs as needed for Wheezing.) 1 each 1    cetirizine 10 MG Oral Tab Take 1 tablet (10 mg total) by mouth daily. 90 tablet 0    Misc Natural Products (TURMERIC CURCUMIN) Oral Cap Take 1,500 mg by mouth daily.      Omega-3 Fatty Acids (FISH OIL OR) Take 1,280 mg by mouth daily.      Calcium Carbonate-Vitamin D (CALCIUM 600 + D OR) Take 1 tablet by mouth daily.      Multiple Vitamin (MULTIVITAMIN ADULT OR) Take by mouth.      fluticasone propionate 50 MCG/ACT Nasal Suspension 2 sprays by Nasal route nightly. (Patient taking differently: 2 sprays by Nasal route as needed.) 48 mL 1    B Complex Vitamins (VITAMIN B COMPLEX 100 IJ)       Ergocalciferol (VITAMIN D OR) Take 2,000 Units by mouth daily.      Probiotic Product (PROBIOTIC DAILY OR) Take by mouth.       No current facility-administered medications on file prior to visit.

## 2025-01-08 ENCOUNTER — TELEPHONE (OUTPATIENT)
Dept: INTERNAL MEDICINE CLINIC | Facility: CLINIC | Age: 60
End: 2025-01-08

## 2025-01-08 NOTE — TELEPHONE ENCOUNTER
Patient LVM that she saw Dr. Clarke yesterday.  Her AVS says she did complete the EKG with us - but she did not  She wants that removed from her record.    There is EKG listed as ordered in her chart yesterday  Checking with supervisor on what needs to be done.

## 2025-01-11 ENCOUNTER — PATIENT MESSAGE (OUTPATIENT)
Facility: LOCATION | Age: 60
End: 2025-01-11

## 2025-01-12 ENCOUNTER — EKG ENCOUNTER (OUTPATIENT)
Dept: LAB | Facility: HOSPITAL | Age: 60
End: 2025-01-12
Attending: INTERNAL MEDICINE
Payer: COMMERCIAL

## 2025-01-12 DIAGNOSIS — Z51.81 THERAPEUTIC DRUG MONITORING: ICD-10-CM

## 2025-01-12 DIAGNOSIS — R14.0 ABDOMINAL BLOATING: ICD-10-CM

## 2025-01-12 DIAGNOSIS — M05.771 RHEUMATOID ARTHRITIS INVOLVING BOTH FEET WITH POSITIVE RHEUMATOID FACTOR (HCC): ICD-10-CM

## 2025-01-12 DIAGNOSIS — M05.772 RHEUMATOID ARTHRITIS INVOLVING BOTH FEET WITH POSITIVE RHEUMATOID FACTOR (HCC): ICD-10-CM

## 2025-01-12 DIAGNOSIS — R73.9 BLOOD GLUCOSE ELEVATED: ICD-10-CM

## 2025-01-12 DIAGNOSIS — Z86.2 HISTORY OF ANEMIA: ICD-10-CM

## 2025-01-12 LAB
ATRIAL RATE: 63 BPM
P AXIS: 60 DEGREES
P-R INTERVAL: 134 MS
Q-T INTERVAL: 430 MS
QRS DURATION: 78 MS
QTC CALCULATION (BEZET): 440 MS
R AXIS: 39 DEGREES
T AXIS: 47 DEGREES
VENTRICULAR RATE: 63 BPM

## 2025-01-12 PROCEDURE — 93005 ELECTROCARDIOGRAM TRACING: CPT

## 2025-01-12 PROCEDURE — 93010 ELECTROCARDIOGRAM REPORT: CPT | Performed by: INTERNAL MEDICINE

## 2025-01-13 DIAGNOSIS — M05.771 RHEUMATOID ARTHRITIS INVOLVING BOTH FEET WITH POSITIVE RHEUMATOID FACTOR (HCC): Primary | ICD-10-CM

## 2025-01-13 DIAGNOSIS — M05.772 RHEUMATOID ARTHRITIS INVOLVING BOTH FEET WITH POSITIVE RHEUMATOID FACTOR (HCC): Primary | ICD-10-CM

## 2025-01-13 DIAGNOSIS — Z51.81 THERAPEUTIC DRUG MONITORING: ICD-10-CM

## 2025-01-13 RX ORDER — PREDNISONE 10 MG/1
TABLET ORAL
Qty: 15 TABLET | Refills: 0 | Status: SHIPPED | OUTPATIENT
Start: 2025-01-13

## 2025-01-13 NOTE — PROGRESS NOTES
She has been on Ceftin and prednisone 5 mg for 1 week.  The plan was to leave her on it for 3 weeks.  She is still having sinus pain and pressure.  I will increase her to prednisone 10 mg twice a day for 5 days then down to 10 mg once a day.

## 2025-01-13 NOTE — TELEPHONE ENCOUNTER
NS- please advise; see patient's MCM  -patient completed EKG on 1/12/25 and is concerned about results.

## 2025-01-14 ENCOUNTER — PATIENT MESSAGE (OUTPATIENT)
Dept: FAMILY MEDICINE CLINIC | Facility: CLINIC | Age: 60
End: 2025-01-14

## 2025-01-14 DIAGNOSIS — R94.31 ABNORMAL EKG: Primary | ICD-10-CM

## 2025-01-14 RX ORDER — PHENTERMINE HYDROCHLORIDE 8 MG/1
8 TABLET ORAL DAILY
Qty: 30 TABLET | Refills: 1 | Status: SHIPPED | OUTPATIENT
Start: 2025-01-14 | End: 2025-02-13

## 2025-02-06 ENCOUNTER — TELEMEDICINE (OUTPATIENT)
Dept: FAMILY MEDICINE CLINIC | Facility: CLINIC | Age: 60
End: 2025-02-06
Payer: COMMERCIAL

## 2025-02-06 ENCOUNTER — TELEPHONE (OUTPATIENT)
Dept: FAMILY MEDICINE CLINIC | Facility: CLINIC | Age: 60
End: 2025-02-06

## 2025-02-06 DIAGNOSIS — U07.1 COVID-19 VIRUS INFECTION: Primary | ICD-10-CM

## 2025-02-06 PROCEDURE — 98005 SYNCH AUDIO-VIDEO EST LOW 20: CPT | Performed by: FAMILY MEDICINE

## 2025-02-06 NOTE — TELEPHONE ENCOUNTER
Ok - please ask pt to get otc dual swab for covid and flu   
Pt notified, appt made  
Spoke to pt, started with some post nasal drip and sore throat 2-3 days ago, however last night symptoms worsened, + chills, unsure on fever, denies shortness of breath, + cough/congestion  Pt has hx of asthma and is currently on tamoxifen due to breast cancer    Requesting Paxlovid.  Approve/deny? Pt is available today for VV  
Tested positive for covid today.  She feels her symptoms started few days ago.  She thinks she had a fever last night.  She has asthma and would some medication.  She would like to speak to a nurse for some advice also  
Normal

## 2025-02-06 NOTE — PROGRESS NOTES
This visit is conducted using Telemedicine with live, interactive video and audio.    Telehealth outside of [facility]  Telehealth Verbal Consent   I conducted a telehealth visit with Yahaira Agrawal today, 02/06/25, which was completed using two-way, real-time interactive audio and video communication. This has been done in good winter to provide continuity of care in the best interest of the provider-patient relationship, due to the COVID -19 public health crisis/national emergency where restrictions of face-to-face office visits are ongoing. Every conscious effort was taken to allow for sufficient and adequate time to complete the visit.  The patient was made aware of the limitations of the telehealth visit, including treatment limitations as no physical exam could be performed.  The patient was advised to call 911 or to go to the ER in case there was an emergency.  The patient was also advised of the potential privacy & security concerns related to the telehealth platform.   The patient was made aware of where to find [facility]'s notice of privacy practices, telehealth consent form and other related consent forms and documents.  which are located on the [facility] website. The patient verbally agreed to telehealth consent form, related consents and the risks discussed.    Lastly, the patient confirmed that they were in [place].   Included in this visit, time may have been spent reviewing labs, medications, radiology tests and decision making. Appropriate medical decision-making and tests are ordered as detailed in the plan of care above.  Coding/billing information is submitted for this visit based on complexity of care and/or time spent for the visit.    Pt here with covid      Started monday / covid + today   Covid neg on tuesday  Getting worse   OTC meds yes   +  cough and congestion  No  sinus tendernss  No  ear pain  No  throat pain  No  fever and chills  No   ++  sick contacts /  sick    ROS otherwise  negative  Past medical, surgical and social history reviewed    Exam   alert, appears stated age and cooperative, Normocephalic, without obvious abnormality, atraumatic, lips, mucosa, and tongue normal; teeth and gums normal, Speaking in full sentences comfortably, Normal work of breathing, Skin color, texture, turgor normal. No rashes or lesions and age appropriate, normal, logical connections, person, place and time/date and no suicidal ideation    A/P  Pt here with     1. COVID-19 virus infection  Discussed med SE and contraindications   Asa 81 mg 2 weeks / discussed ER parameters   - nirmatrelvir-ritonavir 300-100 MG Oral Tablet Therapy Pack; Take two nirmatrelvir tablets (300mg) with one ritonavir tablet (100mg) together twice daily for 5 days.  Dispense: 30 tablet; Refill: 0    RTC in one week if not better or sooner if needed  Questions answered and pt  expressed understanding

## 2025-02-16 ENCOUNTER — ORDER TRANSCRIPTION (OUTPATIENT)
Dept: ADMINISTRATIVE | Facility: HOSPITAL | Age: 60
End: 2025-02-16

## 2025-02-16 DIAGNOSIS — Z13.6 SCREENING FOR CARDIOVASCULAR CONDITION: Primary | ICD-10-CM

## 2025-02-21 ENCOUNTER — TELEPHONE (OUTPATIENT)
Dept: FAMILY MEDICINE CLINIC | Facility: CLINIC | Age: 60
End: 2025-02-21

## 2025-02-21 ENCOUNTER — PATIENT MESSAGE (OUTPATIENT)
Dept: CASE MANAGEMENT | Age: 60
End: 2025-02-21

## 2025-02-21 ENCOUNTER — OFFICE VISIT (OUTPATIENT)
Facility: LOCATION | Age: 60
End: 2025-02-21
Payer: COMMERCIAL

## 2025-02-21 ENCOUNTER — TELEPHONE (OUTPATIENT)
Dept: CASE MANAGEMENT | Age: 60
End: 2025-02-21

## 2025-02-21 DIAGNOSIS — J32.4 CHRONIC PANSINUSITIS: Primary | ICD-10-CM

## 2025-02-21 PROCEDURE — 99213 OFFICE O/P EST LOW 20 MIN: CPT | Performed by: OTOLARYNGOLOGY

## 2025-02-21 NOTE — TELEPHONE ENCOUNTER
Cardio Echo Stress Echo        Status: DENIED        Reference number [id number]     A copy of the denial letter is filed under the MEDIA tab, reference for complete details. You may reach out to Ruddy at [phone] to discuss decision.     Please reach out to patient with plan of care.      Thank you

## 2025-02-21 NOTE — PROGRESS NOTES
Yahaira Agrawal is a 59 year old female.   Chief Complaint   Patient presents with    Sinus Problem     HPI:   She was sick with sinus since October.  I placed her on 3 weeks of Ceftin and prednisone back in January.  She was feeling pretty well normal then she got COVID in early February.  She is still feeling pretty good.  She has mild sinus pressure some mild congestion.  She does not notice any purulent discharge    REVIEW OF SYSTEMS:   GENERAL HEALTH: feels well otherwise  GENERAL : denies fever, chills, sweats, weight loss, weight gain  SKIN: denies any unusual skin lesions or rashes  RESPIRATORY: denies shortness of breath with exertion  NEURO: denies headaches    EXAM:   [facility] 07/22/2020     System Findings Details   Constitutional  Overall appearance - Normal.   Psychiatric  Orientation - Oriented to time, place, person & situation. Appropriate mood and affect.   Head/Face  Facial features -- Normal. Skull - Normal.   Eyes  Pupils equal ,round ,react to light and accomidate   Ears, Nose, Throat, Neck  Ears clear nose mild erythema oropharynx clear neck no masses   Neurological  Memory - Normal. Cranial nerves - Cranial nerves II through XII grossly intact.   Lymph Detail  Submental. Submandibular. Anterior cervical. Posterior cervical. Supraclavicular.       ASSESSMENT AND PLAN:   1. Chronic pansinusitis  Improved after 3 weeks of Ceftin and prednisone.  She did get COVID in early January.  There is still some mild inflammation.  She will use saline irrigations twice a day.  She will add Flonase.  She may stay on Zyrtec.  She will add Mucinex D as needed.  She will let me know if symptoms worsen.      The patient indicates understanding of these issues and agrees to the plan.    No follow-ups on file.    Farshad Del Vlale MD  2/21/2025  9:55 AM

## 2025-02-21 NOTE — TELEPHONE ENCOUNTER
Pt calling and states she received a letter stating her stress echo was denied.   She is scheduled for tomorrow morning     Please advise what patient should do next

## 2025-02-22 ENCOUNTER — LAB ENCOUNTER (OUTPATIENT)
Dept: LAB | Facility: HOSPITAL | Age: 60
End: 2025-02-22
Attending: FAMILY MEDICINE
Payer: COMMERCIAL

## 2025-02-22 ENCOUNTER — HOSPITAL ENCOUNTER (OUTPATIENT)
Dept: CT IMAGING | Facility: HOSPITAL | Age: 60
Discharge: HOME | End: 2025-02-22
Attending: INTERNAL MEDICINE

## 2025-02-22 ENCOUNTER — HOSPITAL ENCOUNTER (OUTPATIENT)
Dept: CV DIAGNOSTICS | Facility: HOSPITAL | Age: 60
End: 2025-02-22
Attending: FAMILY MEDICINE

## 2025-02-22 VITALS
HEIGHT: 70 IN | BODY MASS INDEX: 24.48 KG/M2 | WEIGHT: 171 LBS | SYSTOLIC BLOOD PRESSURE: 110 MMHG | DIASTOLIC BLOOD PRESSURE: 76 MMHG

## 2025-02-22 DIAGNOSIS — Z86.2 HISTORY OF ANEMIA: ICD-10-CM

## 2025-02-22 DIAGNOSIS — R14.0 ABDOMINAL BLOATING: ICD-10-CM

## 2025-02-22 DIAGNOSIS — Z13.6 SCREENING FOR CARDIOVASCULAR CONDITION: ICD-10-CM

## 2025-02-22 DIAGNOSIS — M05.772 RHEUMATOID ARTHRITIS INVOLVING BOTH FEET WITH POSITIVE RHEUMATOID FACTOR (HCC): ICD-10-CM

## 2025-02-22 DIAGNOSIS — R73.9 BLOOD GLUCOSE ELEVATED: ICD-10-CM

## 2025-02-22 DIAGNOSIS — M05.771 RHEUMATOID ARTHRITIS INVOLVING BOTH FEET WITH POSITIVE RHEUMATOID FACTOR (HCC): ICD-10-CM

## 2025-02-22 DIAGNOSIS — Z51.81 THERAPEUTIC DRUG MONITORING: ICD-10-CM

## 2025-02-22 LAB
ALBUMIN SERPL-MCNC: 4.1 G/DL (ref 3.2–4.8)
ALBUMIN/GLOB SERPL: 1.6 {RATIO} (ref 1–2)
ALP LIVER SERPL-CCNC: 42 U/L
ALT SERPL-CCNC: 11 U/L
ANION GAP SERPL CALC-SCNC: 5 MMOL/L (ref 0–18)
AST SERPL-CCNC: 23 U/L (ref ?–34)
BASOPHILS # BLD AUTO: 0.06 X10(3) UL (ref 0–0.2)
BASOPHILS NFR BLD AUTO: 1 %
BILIRUB SERPL-MCNC: 0.4 MG/DL (ref 0.3–1.2)
BUN BLD-MCNC: 19 MG/DL (ref 9–23)
CALCIUM BLD-MCNC: 9.1 MG/DL (ref 8.7–10.6)
CHLORIDE SERPL-SCNC: 106 MMOL/L (ref 98–112)
CHOLEST SERPL-MCNC: 173 MG/DL (ref ?–200)
CO2 SERPL-SCNC: 30 MMOL/L (ref 21–32)
CREAT BLD-MCNC: 0.79 MG/DL
CRP SERPL HS-MCNC: 0.45 MG/L (ref ?–3)
DEPRECATED HBV CORE AB SER IA-ACNC: 70 NG/ML
EGFRCR SERPLBLD CKD-EPI 2021: 87 ML/MIN/1.73M2 (ref 60–?)
EOSINOPHIL # BLD AUTO: 0.22 X10(3) UL (ref 0–0.7)
EOSINOPHIL NFR BLD AUTO: 3.6 %
ERYTHROCYTE [DISTWIDTH] IN BLOOD BY AUTOMATED COUNT: 12.4 %
ERYTHROCYTE [SEDIMENTATION RATE] IN BLOOD: 7 MM/HR
EST. AVERAGE GLUCOSE BLD GHB EST-MCNC: 114 MG/DL (ref 68–126)
FASTING PATIENT LIPID ANSWER: YES
FASTING STATUS PATIENT QL REPORTED: YES
FOLATE SERPL-MCNC: 15.4 NG/ML (ref 5.4–?)
GLOBULIN PLAS-MCNC: 2.5 G/DL (ref 2–3.5)
GLUCOSE BLD-MCNC: 93 MG/DL (ref 70–99)
HBA1C MFR BLD: 5.6 % (ref ?–5.7)
HCT VFR BLD AUTO: 38 %
HDL POC: 86 MG/DL (ref 40–60)
HDLC SERPL-MCNC: 76 MG/DL (ref 40–59)
HGB BLD-MCNC: 12.6 G/DL
IMM GRANULOCYTES # BLD AUTO: 0.02 X10(3) UL (ref 0–1)
IMM GRANULOCYTES NFR BLD: 0.3 %
INSULIN SERPL-ACNC: 6.8 MU/L (ref 3–25)
IRON SATN MFR SERPL: 23 %
IRON SERPL-MCNC: 70 UG/DL
LDLC SERPL CALC-MCNC: 87 MG/DL (ref ?–100)
LYMPHOCYTES # BLD AUTO: 1.7 X10(3) UL (ref 1–4)
LYMPHOCYTES NFR BLD AUTO: 28.1 %
MCH RBC QN AUTO: 30.9 PG (ref 26–34)
MCHC RBC AUTO-ENTMCNC: 33.2 G/DL (ref 31–37)
MCV RBC AUTO: 93.1 FL
MONOCYTES # BLD AUTO: 0.74 X10(3) UL (ref 0.1–1)
MONOCYTES NFR BLD AUTO: 12.2 %
NEUTROPHILS # BLD AUTO: 3.32 X10 (3) UL (ref 1.5–7.7)
NEUTROPHILS # BLD AUTO: 3.32 X10(3) UL (ref 1.5–7.7)
NEUTROPHILS NFR BLD AUTO: 54.8 %
NONHDLC SERPL-MCNC: 97 MG/DL (ref ?–130)
OSMOLALITY SERPL CALC.SUM OF ELEC: 294 MOSM/KG (ref 275–295)
PLATELET # BLD AUTO: 188 10(3)UL (ref 150–450)
POTASSIUM SERPL-SCNC: 4.4 MMOL/L (ref 3.5–5.1)
PROT SERPL-MCNC: 6.6 G/DL (ref 5.7–8.2)
RBC # BLD AUTO: 4.08 X10(6)UL
SODIUM SERPL-SCNC: 141 MMOL/L (ref 136–145)
T4 FREE SERPL-MCNC: 1.1 NG/DL (ref 0.8–1.7)
TC/HDL RATIO: 2 (ref 0–5)
TOTAL CHOLESTEROL POC: 150 MG/DL (ref 0–200)
TOTAL IRON BINDING CAPACITY: 307 UG/DL (ref 250–425)
TRANSFERRIN SERPL-MCNC: 238 MG/DL (ref 250–380)
TRIGL SERPL-MCNC: 47 MG/DL (ref 30–149)
TSI SER-ACNC: 2.32 UIU/ML (ref 0.55–4.78)
VIT B12 SERPL-MCNC: 541 PG/ML (ref 211–911)
VIT D+METAB SERPL-MCNC: 77.1 NG/ML (ref 30–100)
VLDLC SERPL CALC-MCNC: 7 MG/DL (ref 0–30)
WBC # BLD AUTO: 6.1 X10(3) UL (ref 4–11)

## 2025-02-22 PROCEDURE — 85025 COMPLETE CBC W/AUTO DIFF WBC: CPT

## 2025-02-22 PROCEDURE — 83525 ASSAY OF INSULIN: CPT

## 2025-02-22 PROCEDURE — 84443 ASSAY THYROID STIM HORMONE: CPT

## 2025-02-22 PROCEDURE — 85652 RBC SED RATE AUTOMATED: CPT

## 2025-02-22 PROCEDURE — 36415 COLL VENOUS BLD VENIPUNCTURE: CPT

## 2025-02-22 PROCEDURE — 80053 COMPREHEN METABOLIC PANEL: CPT

## 2025-02-22 PROCEDURE — 83550 IRON BINDING TEST: CPT

## 2025-02-22 PROCEDURE — 84439 ASSAY OF FREE THYROXINE: CPT

## 2025-02-22 PROCEDURE — 83540 ASSAY OF IRON: CPT

## 2025-02-22 PROCEDURE — 86141 C-REACTIVE PROTEIN HS: CPT

## 2025-02-22 PROCEDURE — 83036 HEMOGLOBIN GLYCOSYLATED A1C: CPT

## 2025-02-22 PROCEDURE — 80061 LIPID PANEL: CPT

## 2025-02-22 PROCEDURE — 82607 VITAMIN B-12: CPT

## 2025-02-22 PROCEDURE — 82728 ASSAY OF FERRITIN: CPT

## 2025-02-22 PROCEDURE — 82306 VITAMIN D 25 HYDROXY: CPT

## 2025-02-22 PROCEDURE — 82746 ASSAY OF FOLIC ACID SERUM: CPT

## 2025-02-22 NOTE — PROGRESS NOTES
Date of Service 2/22/2025    ROSANNA IRELAND  Date of Birth 5/17/1965    Patient Age: 59 year old    PCP: Mariana Fabian, DO  [address] [place] [id number]    Heart Scan Consult  Preliminary Heart Scan Score: 0    Previous Screening  Heart Scan Completed Previously: No        Peripheral Vascular Scan Completed Previously: No          Risk Factors  Personal Risk Factors  Non-alterable Risk Factors: Age (abn EKG)      Body Mass Index  Body mass index is 24.54 kg/m².    Blood Pressure     /76     (Normal =< 120/80,  Elevated = 120-129/ >80,  High Stage1 130-139/80-89 , Stage2 >140/>90)    Lipid Profile  Patient was in fasting state: Yes    Cholesterol: 150, done on 2/22/2025.  HDL Cholesterol: 86, done on 2/22/2025.  LDL Cholesterol: NA   TriGlycerides <45,     Cholesterol Goals  Value   Total  =< 200   HDL  = > 45 Men = > 55 Women   LDL   =< 100   Triglycerides  =< 150       Glucose and Hemoglobin A1C  Lab Results   Component Value Date    GLU 94 07/26/2024    A1C 5.4 12/14/2023     (Normal Fasting Glucose < 100mg/dl )    Nurse Review  Risk factor information and results reviewed with Nurse: Yes    Recommended Follow Up:  Consult your physician regarding:: Final Heart Scan Report;Discuss potential for Incidental Finding;Discuss Potential for Score Variance      Recommendations for Change:  Nutrition Changes: Low Saturated Fat;Low Fat Dairy;Increase Fiber         Exercise: Enhance Current Program         Weight Management: Maintain Current Weight         Repeat Heart Scan: 5 years if Calcium Score is 0.0;Discuss with your Physician              [facility] Recommended Resources:  Recommended Resources: Upcoming Classes, Medical Services and Health Library [email or web address]            Nora KEITA RN        Please Contact the Nurse Heart Line with any Questions or Concerns [phone].

## 2025-02-24 ENCOUNTER — OFFICE VISIT (OUTPATIENT)
Dept: RHEUMATOLOGY | Facility: CLINIC | Age: 60
End: 2025-02-24
Payer: COMMERCIAL

## 2025-02-24 VITALS
BODY MASS INDEX: 25.05 KG/M2 | RESPIRATION RATE: 16 BRPM | WEIGHT: 175 LBS | TEMPERATURE: 98 F | HEIGHT: 70 IN | DIASTOLIC BLOOD PRESSURE: 72 MMHG | SYSTOLIC BLOOD PRESSURE: 118 MMHG | HEART RATE: 74 BPM | OXYGEN SATURATION: 99 %

## 2025-02-24 DIAGNOSIS — M79.671 BILATERAL FOOT PAIN: ICD-10-CM

## 2025-02-24 DIAGNOSIS — G89.29 CHRONIC MIDLINE LOW BACK PAIN WITHOUT SCIATICA: ICD-10-CM

## 2025-02-24 DIAGNOSIS — M53.3 CHRONIC SI JOINT PAIN: ICD-10-CM

## 2025-02-24 DIAGNOSIS — M05.772 RHEUMATOID ARTHRITIS INVOLVING BOTH FEET WITH POSITIVE RHEUMATOID FACTOR (HCC): Primary | ICD-10-CM

## 2025-02-24 DIAGNOSIS — Z85.3 HISTORY OF BREAST CANCER: ICD-10-CM

## 2025-02-24 DIAGNOSIS — M25.552 BILATERAL HIP PAIN: ICD-10-CM

## 2025-02-24 DIAGNOSIS — R91.8 ABNORMAL CT SCAN OF LUNG: ICD-10-CM

## 2025-02-24 DIAGNOSIS — M54.50 CHRONIC MIDLINE LOW BACK PAIN WITHOUT SCIATICA: ICD-10-CM

## 2025-02-24 DIAGNOSIS — M79.672 BILATERAL FOOT PAIN: ICD-10-CM

## 2025-02-24 DIAGNOSIS — M25.50 POLYARTHRALGIA: ICD-10-CM

## 2025-02-24 DIAGNOSIS — M05.771 RHEUMATOID ARTHRITIS INVOLVING BOTH FEET WITH POSITIVE RHEUMATOID FACTOR (HCC): Primary | ICD-10-CM

## 2025-02-24 DIAGNOSIS — M25.551 BILATERAL HIP PAIN: ICD-10-CM

## 2025-02-24 DIAGNOSIS — R06.02 SHORTNESS OF BREATH: ICD-10-CM

## 2025-02-24 DIAGNOSIS — G89.29 CHRONIC SI JOINT PAIN: ICD-10-CM

## 2025-02-24 PROCEDURE — 99214 OFFICE O/P EST MOD 30 MIN: CPT | Performed by: INTERNAL MEDICINE

## 2025-02-24 RX ORDER — MELOXICAM 7.5 MG/1
7.5 TABLET ORAL 2 TIMES DAILY PRN
Qty: 180 TABLET | Refills: 0 | Status: SHIPPED | OUTPATIENT
Start: 2025-02-24

## 2025-02-24 RX ORDER — PHENTERMINE HYDROCHLORIDE 8 MG/1
1 TABLET ORAL DAILY
COMMUNITY
Start: 2025-02-12

## 2025-02-24 NOTE — PROGRESS NOTES
RHEUMATOLOGY FOLLOW UP   Date of visit: 02/24/2025  ?  Chief Complaint   Patient presents with    Rheumatoid Arthritis     7 month f/u. Feeling ok. Had a long bout of illness and went off hydroxychloroquine for about a month. Back on it and her joint pain is manageable. Converted rapid score of 0.5     ?  ASSESSMENT, DISCUSSION & PLAN   Assessment:  1. Rheumatoid arthritis involving both feet with positive rheumatoid factor (HCC)    2. Polyarthralgia    3. History of breast cancer    4. Chronic midline low back pain without sciatica    5. Chronic SI joint pain    6. Abnormal CT scan of lung    7. Shortness of breath    8. Bilateral hip pain    9. Bilateral foot pain          Discussion:  Ms. Yahaira Agrawal is a 58 yo woman previously healthy who was recently diagnosed with breast cancer (2020) s/p lumpectomy, radiation and chemotherapy (s/p TC and now on tamoxifen). She presents for evaluation of worsened bilateral foot pain and then hand and hip pain. xrays have been consistent with osteoarthritis without signs of erosions or inflammation. However, she was found to have high titer RF positivity (negative CCP and normal inflammatory markers). MRI imaging was not previously consistent with RA but did show some tenosynovitis.   At a prior visit, decision was made to start plaquenil and monitor symptoms. Seems like she is doing better overall in terms of her pain.   She has had some worsened joint pain but seems more related to osteoarthritis rather than active RA. Also struggling with weight loss, following with [facility] now.  Previously discussed that some of her symptoms are also likely tamoxifen related, states this might be her last year of therapy.   She has hx of what sounds like a superficial thrombophlebitis. Labs ordered that she was instructed are pending.   Since her last visit, she was struggling with chronic sinus disease which has since improved.     -- get labs done   -- order for SI joint plain films  placed   -- continue plaquenil (alternating 200mg with 400mg every other day)  -- remember to get annual eye exams to monitor for toxicity from plaquenil   -- okay to take meloxicam twice daily as needed, always with food   -- follow up in 6 months or sooner as needed  -- CT of lungs in 3 months (prelim read for the CT calcium score over-read showed atelectasis vs scarring in the lungs)      Patient verbalized understanding of above instructions. Multiple questions answered today.     Code selection for this visit was based on time spent (35min) on date of service in preparing to see the patient, obtaining and/or reviewing separately obtained history, performing a medically appropriate examination, counseling and educating the patient/family/caregiver, ordering medications or testing, referring and communicating with other healthcare providers, documenting clinical information in the E HR, independently interpreting results and communicating results to the patient/family/caregiver and care coordination with the patient's other providers.    ?  Plan:  Diagnoses and all orders for this visit:    Rheumatoid arthritis involving both feet with positive rheumatoid factor (HCC)  -     CT CHEST HI RESOLUTION (CPT=71250); Future  -     XR SACROILIAC JOINTS (MIN 3 VIEWS) (CPT=72202); Future    Polyarthralgia    History of breast cancer    Chronic midline low back pain without sciatica  -     Meloxicam 7.5 MG Oral Tab; Take 1 tablet (7.5 mg total) by mouth 2 (two) times daily as needed for Pain.    Chronic SI joint pain  -     XR SACROILIAC JOINTS (MIN 3 VIEWS) (CPT=72202); Future    Abnormal CT scan of lung  -     CT CHEST HI RESOLUTION (CPT=71250); Future    Shortness of breath  -     CT CHEST HI RESOLUTION (CPT=71250); Future    Bilateral hip pain  -     Meloxicam 7.5 MG Oral Tab; Take 1 tablet (7.5 mg total) by mouth 2 (two) times daily as needed for Pain.    Bilateral foot pain  -     Meloxicam 7.5 MG Oral Tab; Take 1  tablet (7.5 mg total) by mouth 2 (two) times daily as needed for Pain.              No follow-ups on file.  ?  HPI   Yahaira Agrawal is a 59 year old female with the following active problems who was seen initially due to joint pain and abnormal labs. She had RF positivity and was started on plaquenil. Presents for follow up today.     Since her last visit, she has been doing okay.  Was sick from October through December- chronic sinusitis- was on various antibiotics and steroids.   Was off hcq from mid to late Dec due to chronic sinus infection.   Then started in January.   Suffered covid infection earlier this month. Seen by ENT last week- told 30-40% recovery but told more related to the covid than prior sinusitis. Was told to take mucinex d on top of the other nasal sprays.     Is having some lower back pain- over the SI joints- feels this is more the hip pain she felt before  Has adjusted shoewear- wearing on cloud shoes and feels this has helped her feet pain significantly.   Gets some worsened back/hip pain when seated or sedentary.  Does take meloxicam as needed for the pain and it does help  Denies overt swelling of the joints  Denies morning stiffness.     Prior right thumb pain from injury has improved. Is right handed.   In terms of RA, no recent flares.    Otherwise, tolerating plaquenil 200mg BID. Denies obvious side effects from the plaquenil.  Continues following with oncology and continues tamoxifen and hopeful she will be done end of this year.   Following with [facility]- started Lomaira a month ago, still waiting to see if going to help. Weight is stable at this time. Still working on diet. Is still exercising regularly- tries to do 3x/week.     + some mild dry mouth, always drinking water. Denies recurrent cavities. Drinking water throughout the night.   Denies dry eyes but has some increased tearing.   Denies skin rashes but itching base of scalp- stable.  Denies oral or nasal ulcers.       HPI  from initial consultation  referred for rheumatologic evaluation due to joint pain and rheumatoid factor positivity.     Had been fairly healthy for several years, until finding breast lump in July 2020- found to be breast cancer. Underwent lumpectomy as well as chemotherapy (taxotere and cyclophosphamide- completed fall 2020) and radiation (finished in jan 2021). Continues to take tamoxifen.     Also found to have elevated blood sugar.  Stopped using her inhaler (inhaled steroid) for asthma, was using pulmicort regularly, when stopped, blood sugars improved.     When she was getting back to the gym and exercising more regularly, noticed new foot pain (bilaterally bottom). Feels came out of the blue and was mostly in the mornings.   Then started to notice difficulty getting her wedding ring over her knuckles.   Then started to get bilateral hip pain that would wake from sleep at night.   Had right rib pain, thought related to radiation induced costochondritis   + itching over the back the head as well. Never hx of dandruff or psoriasis     At this time, pain in the feet and hips.   Can get a discomfort while lifting in the wrists and palmar aspect of the MCPs. Has tried ace bandage over the wrists.     + morning stiffness, mostly in the bottom of the feet, about 5-10 minutes.   + hx of iron deficiency as a teenager but nothing really since then. Never required blood transfusions.   + constant thirst but denies dry mouth   + possible spooning of the thumb nail   + hx of chronic low back pain, feels a tightness there. Present in the mornings, while driving and towards the end of the day. Not sure if the back or the hips that would cause her to wake up at night.   + night sweats ?taxomifen   + easy bruising, chronic and unchanged.   + chronic sinus infections. Denies epistaxis.    Hx of one miscarriage, early 1st trimester. 3 other pregnancies, relatively normal   Hx of varicose veins s/p sclerotherapy recently b/l    Hx of intermittent neck pain previously and dx with Schmorl's nodes     Denies neuropathy from chemotherapy.   Denies new skin nodule formation.  The patient denies hair loss (except after chemo), oral or nasal ulcers, photosensitive rash, elevated or scarring rashes, Raynaud's phenomenon, prior renal or liver disease, or history of seizures.  No history of prior blood clot in the legs or lungs, strokes or ischemic phenomenon.  Denies tightening of the skin, nonhealing ulcers on the fingertips, skin calcifications, trouble swallowing, or severe acid reflux.  The patient denies any history of uveitis, crampy abdominal pain, constipation, diarrhea, bloody stools, nodular painful shin bruises, Achilles heel pain or symptoms of enthesitis, psoriatic lesions, pitting of the nails, or history of dactylitis.  There are no symptoms of severe dry eyes, dry mouth, recurrent cavities, or swelling of the cheeks or under the jawbone.  No fevers, chills, lymphadenopathy, or unexpected weight loss  Denies chronic cough or hemoptysis.     Family hx:   Grandmother and aunt (both maternal) had fibromyalgia  Mother also required immunotherapy for colon and lymphoma but tolerated well.    Past Medical History:  Past Medical History:    Allergic rhinitis due to pollen    Skin tests 2013- allergic to mold, ragweed, grass, cats, pollen    Asthma (HCC)    allergy/exercise-induced    Bell's palsy    Breast CA ([facility])    55yrs old (invasive ductal).    Breast cancer in female (HCC)    Right Breast IDC    Ductal carcinoma in situ of breast    right    Easy bruising    Extrinsic asthma, unspecified    Migraines    Rheumatoid arthritis (HCC)    Schmorl's node    neck    Visual impairment    READERS     Past Surgical History:  Past Surgical History:   Procedure Laterality Date    Breast biopsy Right 07/15/2020    Chemotherapy      chemo after lumpectomy 10/2020-12/2020    Colonoscopy  05/12/2021    Dental surgery procedure  11/1/08    Implants     Lumpectomy right  08/13/2020    Other      vein treatment    Radiation right      4wks Dec 2020-Jan 2021    Tonsillectomy       Family History:  Family History   Problem Relation Age of Onset    DCIS Self 53    Breast Cancer Self 55        IDC    Colon Cancer Mother 77    Other (Malt Lymphoma (Cancer)) Mother 73        oral cancer;undergoing treatment    Hypertension Father     Prostate Cancer Father 70    Thyroid disease Sister     No Known Problems Brother     No Known Problems Brother     Allergies Daughter     Anxiety Son     Depression Son     No Known Problems Son     Diabetes Maternal Grandmother     Colon Cancer Maternal Grandmother         Liver and Pancreatic CA, unknown age for dx    Diabetes Maternal Grandfather     Other (Multiple Myeloma) Maternal Aunt         unknown age at dx    Breast Cancer Paternal Aunt 57    Other (Bone Cancer) Paternal Aunt 83    Other (Brain Cancer) Paternal Aunt 64    Other (Lung Cancer) Paternal Aunt 59     Social History:  Social History     Socioeconomic History    Marital status:     Number of children: 3   Occupational History    Occupation: Homemaker    Occupation: marketing    Occupation: development   Tobacco Use    Smoking status: Never    Smokeless tobacco: Never   Vaping Use    Vaping status: Never Used   Substance and Sexual Activity    Alcohol use: Yes     Alcohol/week: 1.0 - 4.0 standard drink of alcohol     Types: 1 - 4 Glasses of wine per week     Comment: Socially    Drug use: No   Other Topics Concern    Caffeine Concern Yes     Comment: 1 a day    Exercise Yes     Comment: 2-3 times/week    Seat Belt Yes     Social Drivers of Health      Received [facility], [facility]    Housing Stability     Medications:  Outpatient Medications Marked as Taking for the 2/24/25 encounter (Office Visit) with Jennifer Palacios DO   Medication Sig Dispense Refill    LOMAIRA 8 MG Oral Tab Take 1 tablet by mouth daily.       Meloxicam 7.5 MG Oral Tab Take 1 tablet (7.5 mg total) by mouth 2 (two) times daily as needed for Pain. 180 tablet 0    MONTELUKAST 10 MG Oral Tab TAKE 1 TABLET NIGHTLY 90 tablet 3    TAMOXIFEN 20 MG Oral Tab TAKE 1 TABLET DAILY 90 tablet 3    HYDROXYCHLOROQUINE 200 MG Oral Tab TAKE 1 TABLET TWICE A DAY (Patient taking differently: Take 1 tablet (200 mg total) by mouth nightly.) 180 tablet 3    albuterol 108 (90 Base) MCG/ACT Inhalation Aero Soln Inhale 2 puffs into the lungs every 6 (six) hours as needed for Wheezing. 1 each 1    cetirizine 10 MG Oral Tab Take 1 tablet (10 mg total) by mouth daily. 90 tablet 0    Misc Natural Products (TURMERIC CURCUMIN) Oral Cap Take 1,500 mg by mouth daily.      Omega-3 Fatty Acids (FISH OIL OR) Take 1,280 mg by mouth daily.      Calcium Carbonate-Vitamin D (CALCIUM 600 + D OR) Take 1 tablet by mouth daily.      Multiple Vitamin (MULTIVITAMIN ADULT OR) Take by mouth.      fluticasone propionate 50 MCG/ACT Nasal Suspension 2 sprays by Nasal route nightly. (Patient taking differently: 2 sprays by Nasal route as needed.) 48 mL 1    B Complex Vitamins (VITAMIN B COMPLEX 100 IJ)       Ergocalciferol (VITAMIN D OR) Take 2,000 Units by mouth daily.      Probiotic Product (PROBIOTIC DAILY OR) Take by mouth.       Modified Medications    Modified Medication Previous Medication    MELOXICAM 7.5 MG ORAL TAB Meloxicam 7.5 MG Oral Tab       Take 1 tablet (7.5 mg total) by mouth 2 (two) times daily as needed for Pain.    Take 1 tablet (7.5 mg total) by mouth 2 (two) times daily as needed for Pain.     Medications Discontinued During This Encounter   Medication Reason    cefuroxime 250 MG Oral Tab     predniSONE 5 MG Oral Tab     predniSONE 10 MG Oral Tab     Meloxicam 7.5 MG Oral Tab      ?  Allergies:  Allergies   Allergen Reactions    Seasonal Runny nose and Coughing     ?  REVIEW OF SYSTEMS   ?  Review of Systems   Constitutional:  Positive for malaise/fatigue (stable). Negative for chills  and fever.   HENT:  Positive for congestion.    Eyes:  Negative for blurred vision, pain and redness.   Respiratory:  Positive for shortness of breath. Negative for cough and hemoptysis.    Cardiovascular:  Negative for chest pain, palpitations and leg swelling.   Gastrointestinal:  Negative for abdominal pain, blood in stool, constipation, diarrhea and heartburn.   Genitourinary:  Positive for frequency. Negative for dysuria, hematuria and urgency.   Musculoskeletal:  Positive for back pain and joint pain. Negative for myalgias and neck pain.   Skin:  Negative for itching and rash.   Neurological:  Negative for dizziness, tingling, seizures, weakness and headaches.   Endo/Heme/Allergies:  Positive for environmental allergies. Bruises/bleeds easily.   Psychiatric/Behavioral:  Negative for depression. The patient is not nervous/anxious and does not have insomnia.      PHYSICAL EXAM   Today's Vitals:  Temperature Blood Pressure Heart Rate Resp Rate SpO2   Temp: 97.8 °F (36.6 °C) BP: 118/72 Pulse: 74 Resp: 16 SpO2: 99 %   ?  Current Weight Height BMI BSA Pain   Wt Readings from Last 1 Encounters:   02/24/25 175 lb (79.4 kg)    Height: 5' 10\" (177.8 cm) Body mass index is 25.11 kg/m². Body surface area is 1.97 meters squared.         Physical Exam  Vitals and nursing note reviewed.   Constitutional:       General: She is not in acute distress.     Appearance: Normal appearance. She is well-developed. She is not diaphoretic.   HENT:      Head: Normocephalic.   Eyes:      General: No scleral icterus.     Extraocular Movements: Extraocular movements intact.      Conjunctiva/sclera: Conjunctivae normal.   Neck:      Vascular: No JVD.      Trachea: No tracheal deviation.   Cardiovascular:      Rate and Rhythm: Normal rate and regular rhythm.      Heart sounds: Normal heart sounds. No murmur heard.  Pulmonary:      Effort: Pulmonary effort is normal. No respiratory distress.      Breath sounds: Normal breath sounds. No  wheezing.   Musculoskeletal:         General: Deformity present. No swelling or tenderness.      Cervical back: Neck supple.      Comments: No swelling, tenderness, redness or restriction of motion of the DIPs, PIPs, MCPs, wrists, elbows, ankles.  Bony deformity b/l 1st MTP, early bunions and tailor bunions also noted.  Bilateral shoulders with full ROM, no evidence of impingement with provocative maneuvers.  Bilateral knees without medial joint line tenderness, no crepitus, no effusion.  Per pt, SI tender b/l, R>L   Lymphadenopathy:      Cervical: No cervical adenopathy.   Skin:     General: Skin is warm and dry.      Findings: No erythema or rash.      Comments: No malar rash  No periungal erythema  No obvious psoriasis  No fingernail pitting    Neurological:      Mental Status: She is alert and oriented to person, place, and time.      Cranial Nerves: No cranial nerve deficit.      Gait: Gait normal.   Psychiatric:         Mood and Affect: Mood normal.         Behavior: Behavior normal.       ?  Radiology review:  CT CALCIUM SCORING OVER READ    Result Date: 2/22/2025  CONCLUSION:  No acute abnormality identified.  LOCATION:  [facility]   Dictated by (CST): Sergio Constantino MD on 2/22/2025 at 7:34 AM     Finalized by (CST): Sergio Constantino MD on 2/22/2025 at 7:35 AM       Narrative   PROCEDURE:  XR HAND (MIN 3 VIEWS), RIGHT (CPT=73130)     TECHNIQUE:  Three views of the right hand were obtained.     COMPARISON:  None.     INDICATIONS:  G89.29 Chronic pain of right thumb M79.644 Chronic pain of right thumb Z91.81 History of fall M05.772 Rheumatoid arthritis involving both feet with positive rh*     PATIENT STATED HISTORY: (As transcribed by Technologist)  Patient states she fell one month ago while walking her dog and injured her right thumb. Patient c/o pain in base of right thumb still at metacarpal area.      FINDINGS:  No evidence of acute displaced fracture or dislocation.  Osteopenia.  Unremarkable soft  tissues.            Impression   CONCLUSION:  No evidence of acute displaced fracture or dislocation in the right hand.     LOCATION:  [id number]        Dictated by (CST): Sergio Constantino MD on 9/28/2023 at 2:44 PM      Finalized by (CST): Sergio Constantino MD on 9/28/2023 at 2:45 PM       PROCEDURE:  XR DEXA BONE DENSITOMETRY (CPT=77080)     COMPARISON:  [facility], XR, XR DEXA BONE DENSITOMETRY (CPT=77080), 6/14/2021, 11:26 AM.     INDICATIONS:    Z13.820 Screening for osteoporosis     PATIENT STATED HISTORY: (As transcribed by Technologist)  Dexa bone density  Screening for osteoporosis          LUMBAR SPINE ANALYSIS RESULTS:      Bone mineral density (BMD) (g/cm2):  1.091    Lumbar T-Score:  0.4      % young normals:  104      % age matched controls:  118      Change from prior spine examination:  2.1%              TOTAL HIP ANALYSIS RESULTS:        Bone mineral density (BMD) (g/cm2):  0.926      Total Hip T-Score:  -0.1      % young normals:  98      % age matched controls:  109      Change from prior hip examination:  -2.6%              FEMORAL NECK ANALYSIS RESULTS:        Bone mineral density (BMD) (g/cm2):  0.803      Femoral neck T-Score:  -0.4      % young normals:  95      % age matched controls:  111      Change from prior hip examination:  -1.2%            ADDITIONAL FINDINGS:  No significant additional findings.                     Impression   CONCLUSION:  Bone mineral density values are within normal range when compared to normal patient population.           The World Health Organization has defined the following categories based on bone density:  Normal bone:  T-score better than -1  Osteopenia: T-score between -1 and -2.5  Osteoporosis:  T-score less than -2.5        LOCATION:  [facility]                Dictated by (CST): Jonn Urbina MD on 6/15/2023 at 1:49 PM       Narrative                 Impression   PROCEDURE:  XR LUMBAR SPINE (MIN 4 VIEWS) (CPT=72110)     LOCATION:  [facility]     TECHNIQUE:  AP,  lateral, oblique, and coned down L5-S1 views were obtained.     COMPARISON:  None.     INDICATIONS:  G89.29 Chronic midline low back pain without sciatica M54.50 Chronic midline low back pain without sciatica     PATIENT STATED HISTORY: (As transcribed by Technologist)  Patient c/o chronic middle lower back pain that radiates into both posterior hips for past several months. Patient states she is also having pain in both feet.         FINDINGS:  Alignment is normal.  Vertebral body heights are maintained throughout the lumbar spine.  Mild loss of L5-S1 disc space with endplate osteoarthritic changes.  Mild facet hypertrophic changes.  Marginal osteophytes.     IMPRESSION:  Mild to moderate osteoarthritic changes in lower lumbar spine.  Incidentally, there is a large amount of stool in the colon.        Dictated by (CST): Arturo Marcum MD on 1/13/2023 at 10:05 AM      Finalized by (CST): Arturo Marcum MD on 1/13/2023 at 10:06 AM         Exam: MRI FOOT/TOE RT W/O CONTRAST   CPT Code(s): 47652 - MRI LOWER EXTREMITY W/O DYE     EXAM: MRI right foot without contrast 12/30/2022.     COMPARISON:  Right foot radiographs 10/5/2022.     INDICATION:  Bilateral foot pain. Polyarthralgia. Rheumatoid factor positive. Right foot pain at the sole of the foot. Swelling. Limited range of motion. History of breast cancer.     TECHNIQUE:  Multiplanar multisequence MR images of the right mid to forefoot were acquired on a 3 Lindsey imaging system without intravenous contrast.     FINDINGS:  No acute fracture. Mild varus angulation of the fifth metatarsophalangeal joint with minimal subchondral marrow edema in the metatarsal head. Severe cartilage loss at the first tarsometatarsal joint with moderate to large marginal osteophytes,    mild to moderate subchondral marrow edema, and mild subchondral cystic changes. Small cysts are also noted at the medial aspect of the metatarsal head with mild heterogeneity of the medial collateral  ligament at suggesting chronic sprain. Joint space   narrowing between the hallux sesamoids and head of the first metatarsal with small to moderate marginal osteophytes. No discrete erosive osseous changes or destructive osseous lesions are appreciated. Small marginal osteophytes at the fourth   tarsometatarsal joint.     The Lisfranc ligament appears intact. Mild thickening of the dorsal talonavicular ligament suggests mild chronic sprain. Trace fluid in the flexor hallucis longus and second flexor tendon sheaths is compatible with tenosynovitis. Otherwise, visualized   portions of the flexor and extensor tendons appear intact. An os navicular is noted. Ganglion cysts measure 3 x 5 x 5 mm dorsal to the navicular medial cuneiform joint, 4 x 6 x 5 mm medial to the calcaneocuboid joint, and 2 x 3 x 3 mm adjacent to the   medial horn of the navicular. No significant fatty atrophy of the visualized musculature. Mild second and trace first intermetatarsal bursal fluid.     IMPRESSION:   1. Moderate osteoarthritic changes and severe cartilage loss of the first metatarsophalangeal joint with associated sesamoid arthropathy and chronic appearing medial collateral ligament sprain.   2. Mild osteoarthritic changes of the fourth tarsometatarsal joint.   3. Varus angulation of the fifth metatarsophalangeal joint with minimal degenerative changes.   4. Mild flexor hallucis longus and second flexor tenosynovitis.   5. Mild and first and second intermetatarsal bursitis.   6. Small ganglion cysts adjacent to the medial horn of the navicular and naviculocuneiform and calcaneocuboid joints.     This report was performed utilizing speech recognition software technology. Despite thorough proofreading, speech recognition errors could escape detection. If a word or phrase is confusing or out of context, please do not hesitate to call for   clarification.     Interpreting Radiologist:     Saniya Bae M.D.   Electronically Signed: 12/31/2022  09:10 AM    PROCEDURE:  XR CHEST PA + LAT CHEST (CPT=71046)       LOCATION:  [facility]       INDICATIONS:  M25.50 Polyarthralgia R76.8 Rheumatoid factor positive       COMPARISON:  [facility] , XR, XR CHEST PA + LAT CHEST (CPT=71020), 7/14/2014, 11:35 PM.       TECHNIQUE:  PA and lateral chest radiographs were obtained.       PATIENT STATED HISTORY: (As transcribed by Technologist)   Rheumatoid factor positive.   No chest complain            FINDINGS:     Heart size is within normal limits.  Pleural spaces appear clear.  Mediastinal and hilar contours are normal.  No focal consolidation.       Impression   CONCLUSION:  No acute abnormality is seen.      Dictated by (CST): Karlos Torres MD on 12/16/2022 at 2:16 PM             PROCEDURE:  XR FOOT, COMPLETE (MIN 3 VIEWS), LEFT (CPT=73630)       LOCATION:  [facility]         TECHNIQUE:  AP, oblique, and lateral views were obtained.       COMPARISON:  None.       INDICATIONS:  Z85.3 History of breast cancer Z92.29 History of tamoxifen therapy R07.81 Rib pain on right side M79.672 Bilateral foot pain M79.671 Bilateral foot pain       PATIENT STATED HISTORY: (As transcribed by Technologist)  Bilateral foot pain for three months, pain and stiffness in am. History of breast cancer with use of tamoxifen.            FINDINGS:     No acute fractures or osseous lesions are identified.  Phalangeal relationships are within normal limits.  No significant ankle joint effusion.  Tiny calcaneal enthesophytes.  Minimal osteoarthritic changes.                        Impression   CONCLUSION:  No acute findings.           Dictated by (CST): Liliam Leyva MD on 10/05/2022 at 4:17 PM       Finalized by (CST): Liliam Leyva MD on 10/05/2022 at 4:18 PM         PROCEDURE:  XR FOOT, COMPLETE (MIN 3 VIEWS), RIGHT (CPT=73630)       LOCATION:  [facility]         TECHNIQUE:  AP, oblique, and lateral views were obtained.       COMPARISON:  None.       INDICATIONS:  Z85.3 History of breast cancer Z92.29 History  of tamoxifen therapy R07.81 Rib pain on right side M79.672 Bilateral foot pain M79.671 Bilateral foot pain       PATIENT STATED HISTORY: (As transcribed by Technologist)  Bilateral foot pain for three months, pain and stiffness in am. History of breast cancer with use of tamoxifen.            FINDINGS:     No acute fractures or osseous lesions are identified.  Phalangeal relationships are within normal limits.  No significant ankle joint effusion.  Osteoarthritic changes.                        Impression   CONCLUSION:  No acute fractures.  Osteoarthritic changes greatest at the 1st MTP joint.           Dictated by (CST): Liliam Leyva MD on 10/05/2022 at 4:18 PM       Finalized by (CST): Liliam Leyva MD on 10/05/2022 at 4:19 PM          PROCEDURE:  XR HIP W OR WO PELVIS (MIN 5 VIEWS), BILAT (CPT=73523)       LOCATION:  [facility]         TECHNIQUE:  Bilateral min 5 views of the hip and pelvis if performed.       COMPARISON:  None.       INDICATIONS:  Z85.3 History of breast cancer Z92.29 History of tamoxifen therapy R07.81 Rib pain on right side R10.2 Pain in female pelvis       PATIENT STATED HISTORY: (As transcribed by Technologist)  Bilateral hip and pelvic pain for one month, pain and stiffness in pm. History of breast cancer with use of tamoxifen.            FINDINGS:     No acute fractures or osseous lesions are identified.  Femoral acetabular relationships are within normal limits.  Mild osteoarthritic changes within bilateral hips.  Calcified phleboliths within the pelvis.                        Impression   CONCLUSION:  No acute fractures.  Mild osteoarthritic changes.           Dictated by (CST): Liliam Leyva MD on 10/05/2022 at 4:16 PM       Finalized by (CST): Liliam Leyva MD on 10/05/2022 at 4:17 PM        PROCEDURE:  XR DEXA BONE DENSITOMETRY (CPT=77080)       COMPARISON:  None.       INDICATIONS:    Z13.820 Screening for osteoporosis       PATIENT STATED HISTORY: (As transcribed by  Technologist)  Dexa bone density   Screening for osteoporosis            LUMBAR SPINE ANALYSIS RESULTS:       Bone mineral density (BMD) (g/cm2):  1.068     Lumbar T-Score:  0.2       % young normals:  102       % age matched controls:  114       Change from prior spine examination:  N/A                TOTAL HIP ANALYSIS RESULTS:         Bone mineral density (BMD) (g/cm2):  0.950       Total Hip T-Score:  0.1       % young normals:  101       % age matched controls:  110       Change from prior hip examination:  N/A                FEMORAL NECK ANALYSIS RESULTS:         Bone mineral density (BMD) (g/cm2):  0.813       Femoral neck T-Score:  -0.3       % young normals:  96       % age matched controls:  110       Change from prior hip examination:  N/A               ADDITIONAL FINDINGS:  No significant additional findings.                          Impression   CONCLUSION:  Negative for osteopenia.       The World Health Organization has defined the following categories based on bone density:   Normal bone:  T-score better than -1   Osteopenia: T-score between -1 and -2.5   Osteoporosis:  T-score less than -2.5               Dictated by (CST): Rodrigo Paiz MD on 6/14/2021 at 12:29 PM       Finalized by (CST): Rodrigo Paiz MD on 6/14/2021 at 12:30 PM              Labs:  Lab Results   Component Value Date    WBC 6.1 02/22/2025    RBC 4.08 02/22/2025    HGB 12.6 02/22/2025    HCT 38.0 02/22/2025    .0 02/22/2025    MCV 93.1 02/22/2025    MCH 30.9 02/22/2025    MCHC 33.2 02/22/2025    RDW 12.4 02/22/2025    NEPRELIM 3.32 02/22/2025    NEUTABS 13.78 (H) 09/25/2020    LYMPHABS 2.12 09/25/2020    EOSABS 0.21 09/25/2020    BASABS 0.00 09/25/2020    NEUT 60 09/25/2020    LYMPH 10 09/25/2020    MON 7 09/25/2020    EOS 1 09/25/2020    BASO 0 09/25/2020    NEPERCENT 54.8 02/22/2025    LYPERCENT 28.1 02/22/2025    MOPERCENT 12.2 02/22/2025    EOPERCENT 3.6 02/22/2025    BAPERCENT 1.0 02/22/2025    NE 3.32 02/22/2025     LYMABS 1.70 02/22/2025    MOABSO 0.74 02/22/2025    EOABSO 0.22 02/22/2025    BAABSO 0.06 02/22/2025     Lab Results   Component Value Date    GLU 93 02/22/2025    BUN 19 02/22/2025    BUNCREA NOT APPLICABLE 12/02/2022    CREATSERUM 0.79 02/22/2025    ANIONGAP 5 02/22/2025    GFRNAA 90 12/21/2021    GFRAA 105 12/21/2021    CA 9.1 02/22/2025    OSMOCALC 294 02/22/2025    ALKPHO 42 (L) 02/22/2025    AST 23 02/22/2025    ALT 11 02/22/2025    BILT 0.4 02/22/2025    TP 6.6 02/22/2025    ALB 4.1 02/22/2025    GLOBULIN 2.5 02/22/2025    AGRATIO 1.3 12/02/2022     02/22/2025    K 4.4 02/22/2025     02/22/2025    CO2 30.0 02/22/2025       Additional Labs:  02/2025  ESR 7 normal  hsCRP normal  Vit D 77.1 normal     12/13/2022   positive  CCP 0.8 negative  Hepatitis panel nonreactive  SPEP grossly normal   ANCA negative, MPO negative, CT-3 negative    12/02/2022   positive   CCP negative  LEISA negative   Lyme screen negative   ESR normal  CRP normal  B12 412   Vit D 58 normal     Jennifer Palacios DO  [facility]  02/24/2025

## 2025-02-25 NOTE — PATIENT INSTRUCTIONS
-- get labs done   -- order for SI joint plain films placed   -- continue plaquenil (alternating 200mg with 400mg every other day)  -- remember to get annual eye exams to monitor for toxicity from plaquenil   -- okay to take meloxicam twice daily as needed, always with food   -- follow up in 6 months or sooner as needed  -- CT of lungs in 3 months     Dr. Palacios

## 2025-03-01 NOTE — PROGRESS NOTES
HPI:   Yahaira Agrawal is a 59 year old female who presents for a complete physical exam.     Wt Readings from Last 6 Encounters:   03/05/25 171 lb 6.4 oz (77.7 kg)   02/24/25 175 lb (79.4 kg)   02/22/25 171 lb (77.6 kg)   01/07/25 171 lb (77.6 kg)   12/10/24 172 lb (78 kg)   11/07/24 171 lb (77.6 kg)     Body mass index is 24.59 kg/m².     Cholesterol, Total (mg/dL)   Date Value   02/22/2025 173   12/14/2023 151   04/15/2021 172     CHOLESTEROL, TOTAL (mg/dL)   Date Value   12/02/2022 170   03/13/2020 198     Total Cholesterol (POC) (mg/dl)   Date Value   02/22/2025 150     HDL Cholesterol (mg/dL)   Date Value   02/22/2025 76 (H)   12/14/2023 96 (H)   04/15/2021 76 (H)     HDL CHOLESTEROL (mg/dL)   Date Value   12/02/2022 78   03/13/2020 76     HDL (POC) (mg/dl)   Date Value   02/22/2025 86 (A)     LDL Cholesterol (mg/dL)   Date Value   02/22/2025 87   12/14/2023 41   04/15/2021 85     LDL-CHOLESTEROL (mg/dL (calc))   Date Value   12/02/2022 79   03/13/2020 109 (H)     LDL (POC) (no units)   Date Value   02/22/2025      Comment:     NA     AST (U/L)   Date Value   02/22/2025 23   07/26/2024 30   12/14/2023 24   12/02/2022 17   12/21/2021 21   03/13/2020 18     ALT (U/L)   Date Value   02/22/2025 11   07/26/2024 16   12/14/2023 20   12/02/2022 11   12/21/2021 13   03/13/2020 10       last pap- will see next week - Dr. Mathew   all previous paps normal  No vaginal discharge  No bladder dysfunction  H/o breast cancer   Pt had chemo radiation and now on tamoxifen / s/p lumpectomy of right breast   Since treatment was started no menses   Oncology - Dr. Joaquin / [facility]  + performing self breast exams  Breast MRI - 2023   dexa - 2023  c-scope - UTD   Done every 3 years   + calcium and vit D supplementation  No family history of breast colon or ovarian cancer    Asthma - ACT 25 / on pulmicort     Sees rheum for RA    On hydroxychloroquine     Sees derm yearly     Going to weight loss clinic   Family h/o CAD  Abn EKG  - insurance denied stress test     Discussed labs       Current Outpatient Medications   Medication Sig Dispense Refill    LOMAIRA 8 MG Oral Tab Take 1 tablet by mouth daily.      Meloxicam 7.5 MG Oral Tab Take 1 tablet (7.5 mg total) by mouth 2 (two) times daily as needed for Pain. 180 tablet 0    MONTELUKAST 10 MG Oral Tab TAKE 1 TABLET NIGHTLY 90 tablet 3    TAMOXIFEN 20 MG Oral Tab TAKE 1 TABLET DAILY 90 tablet 3    HYDROXYCHLOROQUINE 200 MG Oral Tab TAKE 1 TABLET TWICE A DAY (Patient taking differently: Take 1 tablet (200 mg total) by mouth nightly.) 180 tablet 3    albuterol 108 (90 Base) MCG/ACT Inhalation Aero Soln Inhale 2 puffs into the lungs every 6 (six) hours as needed for Wheezing. 1 each 1    cetirizine 10 MG Oral Tab Take 1 tablet (10 mg total) by mouth daily. 90 tablet 0    Misc Natural Products (TURMERIC CURCUMIN) Oral Cap Take 1,500 mg by mouth daily.      Omega-3 Fatty Acids (FISH OIL OR) Take 1,280 mg by mouth daily.      Calcium Carbonate-Vitamin D (CALCIUM 600 + D OR) Take 1 tablet by mouth daily.      Multiple Vitamin (MULTIVITAMIN ADULT OR) Take by mouth.      fluticasone propionate 50 MCG/ACT Nasal Suspension 2 sprays by Nasal route nightly. (Patient taking differently: 2 sprays by Nasal route as needed.) 48 mL 1    B Complex Vitamins (VITAMIN B COMPLEX 100 IJ)       Ergocalciferol (VITAMIN D OR) Take 2,000 Units by mouth daily.      Probiotic Product (PROBIOTIC DAILY OR) Take by mouth.        Past Medical History:    Allergic rhinitis due to pollen    Skin tests 2013- allergic to mold, ragweed, grass, cats, pollen    Asthma (HCC)    allergy/exercise-induced    Bell's palsy    Breast CA ([facility])    55yrs old (invasive ductal).    Breast cancer in female (HCC)    Right Breast IDC    Ductal carcinoma in situ of breast    right    Easy bruising    Extrinsic asthma, unspecified    Migraines    Rheumatoid arthritis (HCC)    Schmorl's node    neck    Visual impairment    READERS      Past Surgical  History:   Procedure Laterality Date    Breast biopsy Right 07/15/2020    Chemotherapy      chemo after lumpectomy 10/2020-12/2020    Colonoscopy  05/12/2021    Dental surgery procedure  11/1/08    Implants    Lumpectomy right  08/13/2020    Other      vein treatment    Radiation right      4wks Dec 2020-Jan 2021    Tonsillectomy        Family History   Problem Relation Age of Onset    DCIS Self 53    Breast Cancer Self 55        IDC    Colon Cancer Mother 77    Other (Malt Lymphoma (Cancer)) Mother 73        oral cancer;undergoing treatment    Hypertension Father     Prostate Cancer Father 70    Thyroid disease Sister     No Known Problems Brother     No Known Problems Brother     Allergies Daughter     Anxiety Son     Depression Son     No Known Problems Son     Diabetes Maternal Grandmother     Colon Cancer Maternal Grandmother         Liver and Pancreatic CA, unknown age for dx    Diabetes Maternal Grandfather     Other (Multiple Myeloma) Maternal Aunt         unknown age at dx    Breast Cancer Paternal Aunt 57    Other (Bone Cancer) Paternal Aunt 83    Other (Brain Cancer) Paternal Aunt 64    Other (Lung Cancer) Paternal Aunt 59      Social History:   Social History     Socioeconomic History    Marital status:     Number of children: 3   Occupational History    Occupation: Homemaker    Occupation: marketing    Occupation: development   Tobacco Use    Smoking status: Never    Smokeless tobacco: Never   Vaping Use    Vaping status: Never Used   Substance and Sexual Activity    Alcohol use: Yes     Alcohol/week: 1.0 - 4.0 standard drink of alcohol     Types: 1 - 4 Glasses of wine per week     Comment: Socially    Drug use: No   Other Topics Concern    Caffeine Concern Yes     Comment: 1 a day    Exercise Yes     Comment: 2-3 times/week    Seat Belt Yes     Social Drivers of Health      Received from [facility], [facility]    Housing Stability     Occ:  : .  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  Diet: watches calories closely     REVIEW OF SYSTEMS:   GENERAL: feels well otherwise  SKIN: denies any unusual skin lesions  EYES:denies blurred vision or double vision  HEENT: denies nasal congestion, sinus pain or ST  LUNGS: denies shortness of breath with exertion  CARDIOVASCULAR: denies chest pain on exertion  GI: denies abdominal pain,denies heartburn  : denies dysuria, vaginal discharge or itching  MUSCULOSKELETAL: denies back pain  NEURO: denies headaches  PSYCHE: denies depression or anxiety  HEMATOLOGIC: denies hx of anemia  ENDOCRINE: denies thyroid history  ALL/ASTHMA: asthma    EXAM:   /72   Pulse 74   Resp 16   Ht 5' 10\" (1.778 m)   Wt 171 lb 6.4 oz (77.7 kg)   LMP 07/22/2020   SpO2 98%   BMI 24.59 kg/m²   Body mass index is 24.59 kg/m².   GENERAL: alert and oriented X 3, well developed, well nourished,in no apparent distress  CARDIO: RRR without murmur  LUNGS: clear to auscultation  NECK: supple,no adenopathy,no thyromegaly  HEENT: atraumatic, normocephalic, TM's effusion no redness  EYES:PERRLA, EOMI, normal,conjunctiva are clear  SKIN: norashes,no suspicious lesions  GI: good BS's,no masses, HSM or tenderness  CHEST: no chest tenderness  MUSCULOSKELETAL: back is not tender,FROM of the back   EXTREMITIES: no cyanosis, clubbing or edema  NEURO: cranial nerves are intact,motor and sensory are grossly intact    ASSESSMENT AND PLAN:   Yahaira Agrawal is a 59 year old female who presents with     1. Annual physical exam      2. Abnormal EKG  See cardiology     3. Rheumatoid arthritis involving both feet with positive rheumatoid factor (HCC)      4. Immunocompromised (HCC)      5. Mild intermittent asthma without complication (HCC)    Pt to forward vaccines     Discussed diet, exercise,calcium, vitamin D, fish oil and self breast exams.   Questions answered and patient indicates understanding of these  issues and agrees to the plan.  Follow up in 1 year or sooner if needed.

## 2025-03-05 ENCOUNTER — OFFICE VISIT (OUTPATIENT)
Dept: FAMILY MEDICINE CLINIC | Facility: CLINIC | Age: 60
End: 2025-03-05
Payer: COMMERCIAL

## 2025-03-05 VITALS
SYSTOLIC BLOOD PRESSURE: 106 MMHG | OXYGEN SATURATION: 98 % | RESPIRATION RATE: 16 BRPM | BODY MASS INDEX: 24.54 KG/M2 | HEIGHT: 70 IN | DIASTOLIC BLOOD PRESSURE: 72 MMHG | HEART RATE: 74 BPM | WEIGHT: 171.38 LBS

## 2025-03-05 DIAGNOSIS — D84.9 IMMUNOCOMPROMISED (HCC): ICD-10-CM

## 2025-03-05 DIAGNOSIS — J45.20 MILD INTERMITTENT ASTHMA WITHOUT COMPLICATION (HCC): ICD-10-CM

## 2025-03-05 DIAGNOSIS — Z00.00 ANNUAL PHYSICAL EXAM: Primary | ICD-10-CM

## 2025-03-05 DIAGNOSIS — M05.772 RHEUMATOID ARTHRITIS INVOLVING BOTH FEET WITH POSITIVE RHEUMATOID FACTOR (HCC): ICD-10-CM

## 2025-03-05 DIAGNOSIS — R94.31 ABNORMAL EKG: ICD-10-CM

## 2025-03-05 DIAGNOSIS — M05.771 RHEUMATOID ARTHRITIS INVOLVING BOTH FEET WITH POSITIVE RHEUMATOID FACTOR (HCC): ICD-10-CM

## 2025-03-05 PROCEDURE — 99396 PREV VISIT EST AGE 40-64: CPT | Performed by: FAMILY MEDICINE

## 2025-03-05 RX ORDER — ALBUTEROL SULFATE 90 UG/1
2 INHALANT RESPIRATORY (INHALATION) EVERY 6 HOURS PRN
Qty: 3 EACH | Refills: 0 | Status: SHIPPED | OUTPATIENT
Start: 2025-03-05

## 2025-03-06 ENCOUNTER — PATIENT MESSAGE (OUTPATIENT)
Dept: INTERNAL MEDICINE CLINIC | Facility: CLINIC | Age: 60
End: 2025-03-06

## 2025-03-10 ENCOUNTER — PATIENT MESSAGE (OUTPATIENT)
Dept: FAMILY MEDICINE CLINIC | Facility: CLINIC | Age: 60
End: 2025-03-10

## 2025-03-13 ENCOUNTER — HOSPITAL ENCOUNTER (OUTPATIENT)
Dept: MAMMOGRAPHY | Facility: HOSPITAL | Age: 60
Discharge: HOME | End: 2025-03-13
Payer: COMMERCIAL

## 2025-03-13 DIAGNOSIS — C50.211 MALIGNANT NEOPLASM OF UPPER-INNER QUADRANT OF RIGHT BREAST IN FEMALE, ESTROGEN RECEPTOR POSITIVE (HCC): ICD-10-CM

## 2025-03-13 DIAGNOSIS — Z17.0 MALIGNANT NEOPLASM OF UPPER-INNER QUADRANT OF RIGHT BREAST IN FEMALE, ESTROGEN RECEPTOR POSITIVE (HCC): ICD-10-CM

## 2025-03-13 PROCEDURE — 77066 DX MAMMO INCL CAD BI: CPT

## 2025-03-13 PROCEDURE — 77062 BREAST TOMOSYNTHESIS BI: CPT

## 2025-03-13 PROCEDURE — 76641 ULTRASOUND BREAST COMPLETE: CPT

## 2025-03-15 ENCOUNTER — HOSPITAL ENCOUNTER (OUTPATIENT)
Dept: GENERAL RADIOLOGY | Facility: HOSPITAL | Age: 60
Discharge: HOME | End: 2025-03-15
Attending: INTERNAL MEDICINE
Payer: COMMERCIAL

## 2025-03-15 DIAGNOSIS — M53.3 CHRONIC SI JOINT PAIN: ICD-10-CM

## 2025-03-15 DIAGNOSIS — M05.771 RHEUMATOID ARTHRITIS INVOLVING BOTH FEET WITH POSITIVE RHEUMATOID FACTOR (HCC): ICD-10-CM

## 2025-03-15 DIAGNOSIS — G89.29 CHRONIC SI JOINT PAIN: ICD-10-CM

## 2025-03-15 DIAGNOSIS — M05.772 RHEUMATOID ARTHRITIS INVOLVING BOTH FEET WITH POSITIVE RHEUMATOID FACTOR (HCC): ICD-10-CM

## 2025-03-15 PROCEDURE — 72202 X-RAY EXAM SI JOINTS 3/> VWS: CPT | Performed by: INTERNAL MEDICINE

## 2025-03-26 ENCOUNTER — TELEMEDICINE (OUTPATIENT)
Dept: INTERNAL MEDICINE CLINIC | Facility: CLINIC | Age: 60
End: 2025-03-26
Payer: COMMERCIAL

## 2025-03-26 DIAGNOSIS — M05.771 RHEUMATOID ARTHRITIS INVOLVING BOTH FEET WITH POSITIVE RHEUMATOID FACTOR (HCC): Primary | ICD-10-CM

## 2025-03-26 DIAGNOSIS — M05.772 RHEUMATOID ARTHRITIS INVOLVING BOTH FEET WITH POSITIVE RHEUMATOID FACTOR (HCC): Primary | ICD-10-CM

## 2025-03-26 DIAGNOSIS — Z17.0 MALIGNANT NEOPLASM OF UPPER-INNER QUADRANT OF RIGHT BREAST IN FEMALE, ESTROGEN RECEPTOR POSITIVE (HCC): ICD-10-CM

## 2025-03-26 DIAGNOSIS — R73.9 BLOOD GLUCOSE ELEVATED: ICD-10-CM

## 2025-03-26 DIAGNOSIS — Z51.81 THERAPEUTIC DRUG MONITORING: ICD-10-CM

## 2025-03-26 DIAGNOSIS — C50.211 MALIGNANT NEOPLASM OF UPPER-INNER QUADRANT OF RIGHT BREAST IN FEMALE, ESTROGEN RECEPTOR POSITIVE (HCC): ICD-10-CM

## 2025-03-26 PROBLEM — U07.1 COVID-19: Status: RESOLVED | Noted: 2021-03-23 | Resolved: 2025-03-26

## 2025-03-26 PROBLEM — R14.0 ABDOMINAL BLOATING: Status: RESOLVED | Noted: 2018-09-13 | Resolved: 2025-03-26

## 2025-03-26 PROBLEM — J42 CHRONIC BRONCHITIS (HCC): Status: ACTIVE | Noted: 2025-03-07

## 2025-03-26 PROBLEM — J34.89 SINUS PRESSURE: Status: RESOLVED | Noted: 2018-09-13 | Resolved: 2025-03-26

## 2025-03-26 PROCEDURE — 98006 SYNCH AUDIO-VIDEO EST MOD 30: CPT | Performed by: NURSE PRACTITIONER

## 2025-03-26 RX ORDER — METFORMIN HYDROCHLORIDE 500 MG/1
500 TABLET, EXTENDED RELEASE ORAL DAILY
Qty: 90 TABLET | Refills: 1 | Status: SHIPPED | OUTPATIENT
Start: 2025-03-26

## 2025-03-26 RX ORDER — DESLORATADINE 5 MG/1
5 TABLET ORAL DAILY
COMMUNITY
Start: 2024-11-02

## 2025-03-26 NOTE — PROGRESS NOTES
[facility] WEIGHT MANAGEMENT VIRTUAL ENCOUNTER     Yahaira Agrawal verbally consents to a Virtual/Telephone Check-In service on 03/26/25   Patient understands and accepts financial responsibility for any deductible, co-insurance and/or co-pays associated with this service.    HISTORY OF PRESENT ILLNESS  Chief Complaint   Patient presents with    Weight Check     Pt had to stop Lomaira d/t heart palpitations as a side effect.     Yahaira Agrawal is a 59 year old female is being evaluated as a video visit using Telemedicine with live, interactive video and audio    Pt states she was not able to tolerate taking Lomaira 8mg capsules. Pt states she felt the stimulant effects but then started having heart palpitations. Pt states she is following up with her cardiologist after the heart palpitations but they subsided after she stopped the medication.    Pt states she is still taking Tamoxifen until December this year. Pt states she was told that when she gets off the medication, she will likely lose some weight. Pt is interested in hearing if there is other medication to try in the meantime to help with weight loss. Pt states she is not interested in injections at this time. Pt states she does eat really healthy, works out, is managing her stress and sleep. Pt denies food cravings. Pt denies difficulty with impulse controls, denies trouble with portion sizes or not noticing when she is full.     Wt Readings from Last 6 Encounters:   03/05/25 171 lb 6.4 oz (77.7 kg)   02/24/25 175 lb (79.4 kg)   02/22/25 171 lb (77.6 kg)   01/07/25 171 lb (77.6 kg)   12/10/24 172 lb (78 kg)   11/07/24 171 lb (77.6 kg)          REVIEW OF SYSTEMS  Review of Systems   All other systems reviewed and are negative.     EXAM  Reviewed most recent set of vitals   Physical Exam:  Physical Exam  Constitutional:       Appearance: Normal appearance.   HENT:      Head: Normocephalic.   Eyes:      Conjunctiva/sclera: Conjunctivae normal.    Pulmonary:      Effort: Pulmonary effort is normal.   Neurological:      General: No focal deficit present.      Mental Status: She is alert and oriented to person, place, and time. Mental status is at baseline.   Psychiatric:         Mood and Affect: Mood normal.         Behavior: Behavior normal.        Lab Results   Component Value Date    WBC 6.1 02/22/2025    RBC 4.08 02/22/2025    HGB 12.6 02/22/2025    HCT 38.0 02/22/2025    MCV 93.1 02/22/2025    MCH 30.9 02/22/2025    MCHC 33.2 02/22/2025    RDW 12.4 02/22/2025    .0 02/22/2025     Lab Results   Component Value Date    GLU 93 02/22/2025    BUN 19 02/22/2025    BUNCREA NOT APPLICABLE 12/02/2022    CREATSERUM 0.79 02/22/2025    ANIONGAP 5 02/22/2025    GFRNAA 90 12/21/2021    GFRAA 105 12/21/2021    CA 9.1 02/22/2025    OSMOCALC 294 02/22/2025    ALKPHO 42 (L) 02/22/2025    AST 23 02/22/2025    ALT 11 02/22/2025    BILT 0.4 02/22/2025    TP 6.6 02/22/2025    ALB 4.1 02/22/2025    GLOBULIN 2.5 02/22/2025    AGRATIO 1.3 12/02/2022     02/22/2025    K 4.4 02/22/2025     02/22/2025    CO2 30.0 02/22/2025     Lab Results   Component Value Date     02/22/2025    A1C 5.6 02/22/2025     Lab Results   Component Value Date    CHOLEST 150 02/22/2025    TRIG (L) 02/22/2025      Comment:      <45    HDL 86 (A) 02/22/2025    LDL  02/22/2025      Comment:      NA    VLDL 7 02/22/2025    TCHDLRATIO 2.2 12/02/2022    NONHDLC 97 02/22/2025    CHOLHDLRATIO 2 02/22/2025     Lab Results   Component Value Date    T4F 1.1 02/22/2025    TSH 2.321 02/22/2025    TSHT4 1.04 03/13/2020     Lab Results   Component Value Date    B12 541 02/22/2025    VITB12 412 12/02/2022     Lab Results   Component Value Date    VITD 77.1 02/22/2025       Medications Ordered Prior to Encounter[1]    ASSESSMENT  Analyzed weight data:       Diagnoses and all orders for this visit:    Rheumatoid arthritis involving both feet with positive rheumatoid factor (HCC)    Therapeutic  drug monitoring  -     metFORMIN  MG Oral Tablet 24 Hr; Take 1 tablet (500 mg total) by mouth daily.    Blood glucose elevated  -     metFORMIN  MG Oral Tablet 24 Hr; Take 1 tablet (500 mg total) by mouth daily.  - recent A1c is 5.6% but pt did have elevated A1c of 6.1% in 2021 - fasting insulin is normal  - Will trial Metformin daily for weight loss and discussed it's mechanism of action on blood sugar control as well.     Malignant neoplasm of upper-inner quadrant of right breast in female, estrogen receptor positive (HCC)    PLAN  Start with medication: Metformin 500mg ER tablet with dinner  advised of side effects and adverse effects of this medication  Advised pt to take one tablet with dinner for the next two weeks and watch for diarrhea and/or nausea. If pt can tolerate the medication, may titrate up to 2g a day of Metformin to yield best chance for highest percentage of weight loss while on medication for the next 6 months  Contradictions: Phentermine/Stimulants = heart palpitations  Future med: Also talked about Contrave potentially being a safe medication for pt to try  Reviewed labs with pt from her 2/2025 results  RA - continue taking med and following rheumatologist  Breast cancer - continue Tamoxifen       Nutrition: low carb diet/ recommended to eat breakfast daily/ regular protein intake  Follow up with dietitian and psychologist as recommended.  Discussed the role of sleep and stress in weight management.  Counseled on comprehensive weight loss plan including attention to nutrition, exercise and behavior/stress management for success. See patient instruction below for more details.  Discussed strategies to overcome barriers to successful weight loss and weight maintenance  FITTE: ACSM recommendations (150-300 minutes/ week in active weight loss)       There are no Patient Instructions on file for this visit.    Dr. Clarke follow-up in June    Patient verbalizes understanding.    Total time  spent on chart review, pre-charting, obtaining history, counseling, and educating, reviewing labs was 30 minutes.       Pt understands phone/video evaluation is not a substitute for face to face examination or emergency care. Pt advised to go to the ER or call 911 for worsening symptoms or acute distress.       Please note that the following visit was completed using two-way, real-time interactive audio and/or video communication.  This has been done in good winter to provide continuity of care in the best interest of the provider-patient relationship, due to the ongoing public health crisis/national emergency and because of restrictions of visitation.  There are limitations of this visit as no physical exam could be performed.  Every conscious effort was taken to allow for sufficient and adequate time.  This billing was spent on reviewing labs, medications, radiology tests and decision making.  Appropriate medical decision-making and tests are ordered as detailed in the plan of care above.     NOTE TO PATIENT: The 21st Century Cures Act makes clinical notes like these available to patients in the interest of transparency. Clinical notes are medical documents used by physicians and care providers to communicate with each other. These documents include medical language and terminology, abbreviations, and treatment information that may sound technical and at times possibly unfamiliar. In addition, at times, the verbiage may appear blunt or direct. These documents are one tool providers use to communicate relevant information and clinical opinions of the care providers in a way that allows common understanding of the clinical context.     Roseanne Rivera, JOSIAH, FNP-BC   Answers submitted by the patient for this visit:  Medical Weight Loss Follow Up (Submitted on 3/26/2025)  If greater than 5/1O how would you grade your coping mechanisms?: superb         [1]   Current Outpatient Medications on File Prior to Visit    Medication Sig Dispense Refill    albuterol 108 (90 Base) MCG/ACT Inhalation Aero Soln Inhale 2 puffs into the lungs every 6 (six) hours as needed for Wheezing. 3 each 0    LOMAIRA 8 MG Oral Tab Take 1 tablet by mouth daily.      Meloxicam 7.5 MG Oral Tab Take 1 tablet (7.5 mg total) by mouth 2 (two) times daily as needed for Pain. 180 tablet 0    MONTELUKAST 10 MG Oral Tab TAKE 1 TABLET NIGHTLY 90 tablet 3    TAMOXIFEN 20 MG Oral Tab TAKE 1 TABLET DAILY 90 tablet 3    HYDROXYCHLOROQUINE 200 MG Oral Tab TAKE 1 TABLET TWICE A DAY (Patient taking differently: Take 1 tablet (200 mg total) by mouth nightly.) 180 tablet 3    cetirizine 10 MG Oral Tab Take 1 tablet (10 mg total) by mouth daily. 90 tablet 0    Misc Natural Products (TURMERIC CURCUMIN) Oral Cap Take 1,500 mg by mouth daily.      Omega-3 Fatty Acids (FISH OIL OR) Take 1,280 mg by mouth daily.      Calcium Carbonate-Vitamin D (CALCIUM 600 + D OR) Take 1 tablet by mouth daily.      Multiple Vitamin (MULTIVITAMIN ADULT OR) Take by mouth.      fluticasone propionate 50 MCG/ACT Nasal Suspension 2 sprays by Nasal route nightly. (Patient taking differently: 2 sprays by Nasal route as needed.) 48 mL 1    B Complex Vitamins (VITAMIN B COMPLEX 100 IJ)       Ergocalciferol (VITAMIN D OR) Take 2,000 Units by mouth daily.      Probiotic Product (PROBIOTIC DAILY OR) Take by mouth.       No current facility-administered medications on file prior to visit.

## 2025-04-04 ENCOUNTER — OFFICE VISIT (OUTPATIENT)
Dept: SURGERY | Facility: CLINIC | Age: 60
End: 2025-04-04
Payer: COMMERCIAL

## 2025-04-04 VITALS
TEMPERATURE: 98 F | RESPIRATION RATE: 16 BRPM | HEART RATE: 73 BPM | WEIGHT: 175 LBS | OXYGEN SATURATION: 96 % | BODY MASS INDEX: 25 KG/M2 | SYSTOLIC BLOOD PRESSURE: 120 MMHG | DIASTOLIC BLOOD PRESSURE: 68 MMHG

## 2025-04-04 DIAGNOSIS — C50.211 MALIGNANT NEOPLASM OF UPPER-INNER QUADRANT OF RIGHT BREAST IN FEMALE, ESTROGEN RECEPTOR POSITIVE (HCC): Primary | ICD-10-CM

## 2025-04-04 DIAGNOSIS — R92.30 DENSE BREASTS: ICD-10-CM

## 2025-04-04 DIAGNOSIS — Z17.0 MALIGNANT NEOPLASM OF UPPER-INNER QUADRANT OF RIGHT BREAST IN FEMALE, ESTROGEN RECEPTOR POSITIVE (HCC): Primary | ICD-10-CM

## 2025-04-04 PROCEDURE — 99213 OFFICE O/P EST LOW 20 MIN: CPT | Performed by: SURGERY

## 2025-04-10 ENCOUNTER — PATIENT MESSAGE (OUTPATIENT)
Dept: INTERNAL MEDICINE CLINIC | Facility: CLINIC | Age: 60
End: 2025-04-10

## 2025-04-19 ENCOUNTER — PATIENT MESSAGE (OUTPATIENT)
Dept: INTERNAL MEDICINE CLINIC | Facility: CLINIC | Age: 60
End: 2025-04-19

## 2025-04-24 NOTE — TELEPHONE ENCOUNTER
I called patient this morning and LVM  Also sent a my chart this morning telling her she can stop med and to f/u with me on her symptoms so I can inform the provider.
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
## 2025-05-01 ENCOUNTER — OFFICE VISIT (OUTPATIENT)
Age: 60
End: 2025-05-01
Attending: SPECIALIST
Payer: COMMERCIAL

## 2025-05-01 VITALS
TEMPERATURE: 98 F | OXYGEN SATURATION: 99 % | RESPIRATION RATE: 18 BRPM | HEART RATE: 72 BPM | SYSTOLIC BLOOD PRESSURE: 144 MMHG | BODY MASS INDEX: 25 KG/M2 | WEIGHT: 174.63 LBS | DIASTOLIC BLOOD PRESSURE: 82 MMHG

## 2025-05-01 DIAGNOSIS — Z08 ENCOUNTER FOR FOLLOW-UP SURVEILLANCE OF BREAST CANCER: ICD-10-CM

## 2025-05-01 DIAGNOSIS — Z17.0 MALIGNANT NEOPLASM OF UPPER-INNER QUADRANT OF RIGHT BREAST IN FEMALE, ESTROGEN RECEPTOR POSITIVE (HCC): Primary | ICD-10-CM

## 2025-05-01 DIAGNOSIS — Z85.3 ENCOUNTER FOR FOLLOW-UP SURVEILLANCE OF BREAST CANCER: ICD-10-CM

## 2025-05-01 DIAGNOSIS — Z79.810 ENCOUNTER FOR MONITORING TAMOXIFEN THERAPY: ICD-10-CM

## 2025-05-01 DIAGNOSIS — C50.211 MALIGNANT NEOPLASM OF UPPER-INNER QUADRANT OF RIGHT BREAST IN FEMALE, ESTROGEN RECEPTOR POSITIVE (HCC): Primary | ICD-10-CM

## 2025-05-01 DIAGNOSIS — Z51.81 ENCOUNTER FOR MONITORING TAMOXIFEN THERAPY: ICD-10-CM

## 2025-05-01 DIAGNOSIS — Z79.810 LONG-TERM CURRENT USE OF TAMOXIFEN: ICD-10-CM

## 2025-05-01 RX ORDER — TAMOXIFEN CITRATE 20 MG/1
20 TABLET ORAL DAILY
Qty: 90 TABLET | Refills: 2 | Status: SHIPPED | OUTPATIENT
Start: 2025-05-01

## 2025-05-01 NOTE — PROGRESS NOTES
Education Record    Learner:  Patient    Disease / Diagnosis: right breast cancer       Barriers / Limitations:  None   Comments:    Method:  Discussion   Comments:    General Topics:  Plan of care reviewed   Comments:    Outcome:  Shows understanding   Comments:   Taking tamoxifen. Recently increase in hot flashes, had decreased now more frequent. Tolerable.   Last gynein March.   Denies abnormal vaginal bleeding or spotting.     Alternating MRI/ and Mammogram order per Dr Hill.

## 2025-05-01 NOTE — PROGRESS NOTES
[facility] Progress Note       Patient Name: Yahaira Agrawal   YOB: 1965  Medical Record Number: [id number]  Attending Physician: Farhan Joaquin M.D.     The 21st Century Cures Act makes medical notes like these available to patients in the interest of transparency. Please be advised this is a medical document. Medical documents are intended to carry relevant information, facts as evident, and the clinical opinion of the practitioner. The medical note is intended as peer to peer communication and may appear blunt or direct. It is written in medical language and may contain abbreviations or verbiage that are unfamiliar.     Date of Visit: 5/1/2025       Chief Complaint  Invasive ductal carcinoma, right breast - follow up.    Oncologic History  Yahaira Agrawal is a 59 year old female who on 08/13/2020 underwent right breast lumpectomy with sentinel lymph node biopsy for a grade 3, 0.4 cm infiltrating ductal carcinoma with 1 negative lymph node (0/1). Immunohistochemical studies were as follows: estrogen receptor 90% (strong), progesterone receptor 90% (strong), Her2 0, Ki67 25%. She was staged as [id number].    OncotypeDX testing showed a recurrence score of 28. She received four cycles of adjuvant chemotherapy with docetaxel and cyclophosphamide. In 12/2020 she began adjuvant endocrine therapy with tamoxifen. From 12/29/2020 to 01/26/2021 she received adjuvant radiation therapy.     No known pathogenic variants were found in the following 9 genes: KARTHIK, BRCA1, BRCA2, CDH1, CHEK2, PALB2, PTEN, STK11, and TP53.      History of Present Illness  Patient returns for follow up. She denies any self palpated breast masses or nipple discharge. She denies any vaginal bleeding. No new respiratory symptoms.     Performance Status   Karnofsky 100% - Normal, no complaints.    Past Medical History (historical data, reviewed by physician)   Invasive ductal carcinoma, right breast  (as above); rheumatoid arthritis; asthma; Bell's palsy.     Past Surgical History (historical data, reviewed by physician)   Right breast lumpectomy with sentinel lymph node biopsy; dental implants; tonsillectomy.    Family History (historical data, reviewed by physician)   Ms. Agrawal has a daughter and two sons. Ms. Agrawal has two younger brothers and one younger sister. Ms. Agrawal’s sister had a partial thyroidectomy at age 19 for a benign thyroid nodule.       Ms. Agrawal’s mother was diagnosed with a right sided colon cancer in 2019. Immunohistochemistry for mismatch repair proteins showed loss of MLH1, MSH6, and PMS2. MSH2 expression was intact.  Her reported medical history is also notable for MALT lymphoma of the oral cavity in .   Genetic testing on Ms. Agrawal’s mother showed no germline pathogenic variants in MLH1, MSH2 (including EPCAM deletion/duplication analysis), MSH6, or PMS2.  Two somatic MSH6 pathogenic variants were detected in the colon tumor.  The observed loss of MLH1 and PMS2 is due to somatic MLH1 promoter hypermethylation, the loss of MSH6 is attributed to somatic mutations in MSH6.  Ms. Agrawal’s mother had two brothers and two sisters.  Ms. Agrawal’s maternal aunt  at age 83 from myeloma.  Ms. Agrawal’s maternal grandmother  at age 74 from colorectal cancer.        Ms. Agrawal’s father had prostate cancer, Janine score 3+4, at age 81 and melanoma skin cancer.  He has two sisters and four brothers. Ms. Agrawal’s paternal aunt had breast cancer at age 57.  Two of Ms. Agrawal’s paternal uncles had melanoma.  Another paternal aunt had lung cancer in her late 50s.     Social History (historical data, reviewed by physician)   Denies tobacco use.    Current Medications   tamoxifen 20 MG Oral Tab Take 1 tablet (20 mg total) by mouth daily. 90 tablet 2    albuterol 108 (90 Base) MCG/ACT Inhalation Aero Soln Inhale 2 puffs into the lungs every 6 (six) hours  as needed for Wheezing. 3 each 0    Meloxicam 7.5 MG Oral Tab Take 1 tablet (7.5 mg total) by mouth 2 (two) times daily as needed for Pain. 180 tablet 0    MONTELUKAST 10 MG Oral Tab TAKE 1 TABLET NIGHTLY 90 tablet 3    HYDROXYCHLOROQUINE 200 MG Oral Tab TAKE 1 TABLET TWICE A DAY (Patient taking differently: Take 1 tablet (200 mg total) by mouth nightly.) 180 tablet 3    cetirizine 10 MG Oral Tab Take 1 tablet (10 mg total) by mouth daily. 90 tablet 0    Misc Natural Products (TURMERIC CURCUMIN) Oral Cap Take 1,500 mg by mouth in the morning.      Omega-3 Fatty Acids (FISH OIL OR) Take 1,280 mg by mouth in the morning.      Calcium Carbonate-Vitamin D (CALCIUM 600 + D OR) Take 1 tablet by mouth in the morning.      Multiple Vitamin (MULTIVITAMIN ADULT OR) Take by mouth.      fluticasone propionate 50 MCG/ACT Nasal Suspension 2 sprays by Nasal route nightly. (Patient taking differently: 2 sprays by Nasal route as needed.) 48 mL 1    B Complex Vitamins (VITAMIN B COMPLEX 100 IJ)       Ergocalciferol (VITAMIN D OR) Take 2,000 Units by mouth in the morning.      Probiotic Product (PROBIOTIC DAILY OR) Take by mouth.       Allergies   Ms. Agrawal is allergic to seasonal.    Vital Signs   /82 (BP Location: Left arm, Patient Position: Sitting, Cuff Size: adult)   Pulse 72   Temp 97.5 °F (36.4 °C) (Temporal)   Resp 18   Wt 79.2 kg (174 lb 9.6 oz)   LMP 07/22/2020   SpO2 99%   BMI 25.05 kg/m²     Physical Examination   Constitutional      Well developed, well nourished. No apparent distress.   Head                   Normocephalic and atraumatic.  Eyes                   Conjunctiva clear; sclera anicteric.  ENMT                 External nose normal; external ears normal.  Neck                   Supple, without masses.  Hematologic/Lymphatic No cervical, supraclavicular, axillary lymphadenopathy.   Respiratory          Normal effort; no respiratory distress; lungs clear to auscultation  bilaterally.  Cardiovascular     Regular rate and rhythm.  Abdomen            Non-tender; non-distended; no masses,no fluid wave; no hepatosplenomegaly.  Extremities          No lower extremity edema.  Neurologic           Motor and sensory grossly intact.  Psychiatric          Mood and affect appropriate.    Laboratory   No results found for this or any previous visit (from the past 48 hours).    Radiology  [facility]  Department of Radiology  [address]  [place] [address] [phone]      Name: Yahaira Agrawal Dr: SHEA Jacques  : 1965    Age/Sex: 59 year old Female  Pt. Phone: [phone]  Exam Date: 2025  Procedure: [facility] RATNA 2D+3D DIAGNOSTIC [facility]  BILAT (CKA=37094/20169)   -----------------------------------------------------------------------------------------------------------------------------------------------                  SHEA Jacques  [address]  [place] [address] [place] [id number]      Interpretation   PROCEDURE:  [facility] RATNA 2D+3D DIAGNOSTIC [facility]  BILAT (CPT=77066/75787)     COMPARISON:  US CEDRIC, US BREAST BILAT COMP([facility] SAME DAY US)(CPT=76641-50), 3/13/2025, 9:08 AM.  CEDRIC MG, [facility] RATNA 2D+3D DIAGNOSTIC [facility]  BILAT (NUT=19622/01969), 3/04/2024, 9:02 AM.     INDICATIONS:  Z17.0 Malignant neoplasm of upper-inner quadrant of right breast in female, estrogen receptor positive (HCC) C50.211 Malignant neoplasm of upper-inner quadrant*     VIEWS OBTAINED:  Diagnostic views of both breasts were obtained.  Bilateral whole breast ultrasound was performed.   Standard 2D and additional multiplane thin sections of the breast were obtained for the purpose of Tomosynthesis evaluation.  The images were reconstructed and reviewed on the dedicated Tomosynthesis workstation.     BREAST COMPOSITION:  Category D - The breasts are extremely dense, which lowers the sensitivity of mammography.     FINDINGS:  Stable parenchymal pattern  both breasts.  Post lumpectomy changes right breast appears stable.  No suspicious calcifications, spiculated masses or areas of architectural distortion in either breast.  Bilateral whole breast ultrasound was   performed at the request of the ordering physician for supplemental screening.  There is no sonographic abnormality in either breast.                      =====  CONCLUSION:  Stable bilateral mammogram.     Unremarkable bilateral breast ultrasound.       BI-RADS CATEGORY:    DIAGNOSTIC CATEGORY 2 - BENIGN FINDING:       RECOMMENDATIONS:    ROUTINE MAMMOGRAM AND CLINICAL EVALUATION IN 12 MONTHS.       Because of breast density personal history of breast cancer, this patient may benefit from supplemental screening with breast MRI for increased sensitivity for detection of cancer which can be obscured mammographically.  Patient is due for her annual   screening breast MRI in 6 months..        A letter explaining the results in lay terms has been sent to the patient.  This exam was evaluated with a computer-aided device.  This patient's information has been entered into a reminder system with a target due date for the next mammogram.        LOCATION:  [facility]        Dictated by (CST): Nora Truong MD on 3/13/2025 at 10:06 AM       Finalized by (CST): Nora Truong MD on 3/13/2025 at 10:08 AM        Impression and Plan   1.   Invasive ductal carcinoma, right breast: Patient was staged as [id number]. Her disease was estrogen receptor and progesterone receptor positive and Her2 negative. Her OncotypeDX score was 28.  She underwent lumpectomy with sentinel lymph node biopsy, adjuvant chemotherapy and adjuvant radiation therapy. In 12/2020 she began adjuvant endocrine therapy with tamoxifen.           Continue tamoxifen without modification. She will complete 5 years of therapy in 12/2025 after which she will discontinue therapy.           Breast imaging for the next year has been ordered by Dr. Hill. Patient will  return to see me in 1 year at which time I will order her imaging for the next year.           Survivorship issues were addressed with the patient. No issues were identified by the patient.      Planned Follow Up   Patient will return for follow up in 1 year.    Electronically Signed by:     Farhan Joaquin M.D.  System Medical Director, Oncology Services  [facility]

## 2025-05-08 ENCOUNTER — TELEPHONE (OUTPATIENT)
Facility: LOCATION | Age: 60
End: 2025-05-08

## 2025-05-08 NOTE — TELEPHONE ENCOUNTER
1. Chronic pansinusitis  Improved after 3 weeks of Ceftin and prednisone.  She did get COVID in early January.  There is still some mild inflammation.  She will use saline irrigations twice a day.  She will add Flonase.  She may stay on Zyrtec.  She will add Mucinex D as needed.  She will let me know if symptoms worsen.        The patient indicates understanding of these issues and agrees to the plan.     No follow-ups on file.     Farshad Del Valle MD  2/21/2025  9:55 AM    Pt called and is saying she has a bad sinus infection she is feeling very full and extreme pressure to the point of a migraine. Pt is asking if doctor can prescribe medication or antibiotic.Please advise. Best call back number is [phone].

## 2025-05-09 RX ORDER — PREDNISONE 5 MG/1
5 TABLET ORAL DAILY
Qty: 7 TABLET | Refills: 0 | Status: SHIPPED | OUTPATIENT
Start: 2025-05-09

## 2025-05-09 RX ORDER — CEFUROXIME AXETIL 250 MG/1
250 TABLET ORAL 2 TIMES DAILY
Qty: 20 TABLET | Refills: 0 | Status: SHIPPED | OUTPATIENT
Start: 2025-05-09

## 2025-05-25 DIAGNOSIS — M05.771 RHEUMATOID ARTHRITIS INVOLVING BOTH FEET WITH POSITIVE RHEUMATOID FACTOR (HCC): ICD-10-CM

## 2025-05-25 DIAGNOSIS — M05.772 RHEUMATOID ARTHRITIS INVOLVING BOTH FEET WITH POSITIVE RHEUMATOID FACTOR (HCC): ICD-10-CM

## 2025-05-27 RX ORDER — HYDROXYCHLOROQUINE SULFATE 200 MG/1
200 TABLET, FILM COATED ORAL 2 TIMES DAILY
Qty: 180 TABLET | Refills: 3 | Status: SHIPPED | OUTPATIENT
Start: 2025-05-27

## 2025-05-27 NOTE — TELEPHONE ENCOUNTER
LOV: 2/24/2024    RTC: 6 months   Future Appointments   Date Time Provider Department Center   5/30/2025  2:45 PM Sarah Mcclain, DO [facility] [address]   5/31/2025  9:30 AM EH CT MAIN RM4 [facility]   6/25/2025 10:20 AM Ju Clarke MD [facility] [address]   8/18/2025  9:15 AM Jennifer Palacios DO [facility] [address]   9/8/2025 11:40 AM Ju Clarke MD [facility] [address]   12/10/2025  8:40 AM Ju Clarke MD [facility] [address]   2/23/2026  3:30 PM Jennifer Palacios DO [facility] [address]   3/6/2026  8:00 AM Mariana Fabian DO [facility] [address] [facility] [address]   LABS:   Component      Latest Ref Rng 7/26/2024   WBC      4.0 - 11.0 x10(3) uL 4.5    RBC      3.80 - 5.30 x10(6)uL 4.26    Hemoglobin      12.0 - 16.0 g/dL 13.4    Hematocrit      35.0 - 48.0 % 38.8    Platelet Count      150.0 - 450.0 10(3)uL 173.0    MCV      80.0 - 100.0 fL 91.1    MCH      26.0 - 34.0 pg 31.5    MCHC      31.0 - 37.0 g/dL 34.5    RDW      % 12.3    Prelim Neutrophil Abs      1.50 - 7.70 x10 (3) uL 2.20    Neutrophils Absolute      1.50 - 7.70 x10(3) uL 2.20    Lymphocytes Absolute      1.00 - 4.00 x10(3) uL 1.46    Monocytes Absolute      0.10 - 1.00 x10(3) uL 0.50    Eosinophils Absolute      0.00 - 0.70 x10(3) uL 0.25    Basophils Absolute      0.00 - 0.20 x10(3) uL 0.06    Immature Granulocyte Absolute      0.00 - 1.00 x10(3) uL 0.01    Neutrophils %      % 49.1    Lymphocytes %      % 32.6    Monocytes %      % 11.2    Eosinophils %      % 5.6    Basophils %      % 1.3    Immature Granulocyte %      % 0.2    Glucose      70 - 99 mg/dL 94    Sodium      136 - 145 mmol/L 140    Potassium      3.5 - 5.1 mmol/L 3.9    Chloride      98 - 112 mmol/L 107    Carbon Dioxide, Total      21.0 - 32.0 mmol/L 28.0    ANION GAP      0 - 18 mmol/L 5    BUN      9 - 23 mg/dL 14    CREATININE      0.55 - 1.02 mg/dL 0.79    CALCIUM      8.7 - 10.4 mg/dL 9.6    CALCULATED OSMOLALITY      275 - 295 mOsm/kg 290    EGFR      >=60 mL/min/1.73m2 87    AST  (SGOT)      <34 U/L 30    ALT (SGPT)      10 - 49 U/L 16    ALKALINE PHOSPHATASE      46 - 118 U/L 43 (L)    Total Bilirubin      0.3 - 1.2 mg/dL 0.5    PROTEIN, TOTAL      5.7 - 8.2 g/dL 7.2    Albumin      3.2 - 4.8 g/dL 4.4    Globulin      2.0 - 3.5 g/dL 2.8    A/G Ratio      1.0 - 2.0  1.6    Patient Fasting for CMP? No    APTT      22.9 - 30.2 sec 26.7    PT      9.1 - 12.0 sec 10.8    INR      0.9 - 1.2  1.0    Thrombin Time      0.0 - 23.0 sec 19.0    DRVVT      0.0 - 47.0 sec 39.3    Hexagonal Phase Phospholipid      0 - 11 sec 6    Cardiolipin IgG      0 - 14 GPL U/mL <9    Cardiolipin IgM      0 - 12 MPL U/mL 14 (H)    B2 Glycoprot I IgG Ab      0 - 20 GPI IgG units <9    B2 Glycoprot I IgM Ab      0 - 32 GPI IgM units 11    APS Panel Interp Comment    C-REACTIVE PROTEIN      <=0.50 mg/dL <0.40    SED RATE      0 - 30 mm/Hr 12    Coag Interp Note       Legend:  (L) Low  (H) High    LAST REFILL: 6/17/2024, Quantity 180 tablets, Refills 3    Dr Palacios-- orders pending, approve if agreeable.

## 2025-05-31 ENCOUNTER — HOSPITAL ENCOUNTER (OUTPATIENT)
Dept: CT IMAGING | Facility: HOSPITAL | Age: 60
Discharge: HOME | End: 2025-05-31
Attending: INTERNAL MEDICINE
Payer: COMMERCIAL

## 2025-05-31 DIAGNOSIS — M05.771 RHEUMATOID ARTHRITIS INVOLVING BOTH FEET WITH POSITIVE RHEUMATOID FACTOR (HCC): ICD-10-CM

## 2025-05-31 DIAGNOSIS — R91.8 ABNORMAL CT SCAN OF LUNG: ICD-10-CM

## 2025-05-31 DIAGNOSIS — M05.772 RHEUMATOID ARTHRITIS INVOLVING BOTH FEET WITH POSITIVE RHEUMATOID FACTOR (HCC): ICD-10-CM

## 2025-05-31 DIAGNOSIS — R06.02 SHORTNESS OF BREATH: ICD-10-CM

## 2025-05-31 PROCEDURE — 71250 CT THORAX DX C-: CPT | Performed by: INTERNAL MEDICINE

## 2025-06-03 ENCOUNTER — OFFICE VISIT (OUTPATIENT)
Dept: FAMILY MEDICINE CLINIC | Facility: CLINIC | Age: 60
End: 2025-06-03
Payer: COMMERCIAL

## 2025-06-03 VITALS
BODY MASS INDEX: 24.91 KG/M2 | SYSTOLIC BLOOD PRESSURE: 122 MMHG | WEIGHT: 174 LBS | HEIGHT: 70 IN | DIASTOLIC BLOOD PRESSURE: 72 MMHG

## 2025-06-03 DIAGNOSIS — M67.441 GANGLION CYST OF FLEXOR TENDON SHEATH OF FINGER OF RIGHT HAND: Primary | ICD-10-CM

## 2025-06-03 PROCEDURE — 99213 OFFICE O/P EST LOW 20 MIN: CPT | Performed by: FAMILY MEDICINE

## 2025-06-03 NOTE — PROGRESS NOTES
Subjective:   Patient ID: Yahaira Agrawal is a 60 year old female.    Tendon cyst in rt palm.  No pain.  Slowly growing.  Does lots of working out and weight lifting.  History of ganglion cyst on thumb years ago.      History/Other:   Review of Systems   All other systems reviewed and are negative.    Current Medications[1]  Allergies:Allergies[2]    Objective:   Physical Exam  Skin:     Comments: Tendon cyst in rt palm, no tenderness.         Assessment & Plan:   1. Ganglion cyst of flexor tendon sheath of finger of right hand      1. Ganglion cyst of flexor tendon sheath of finger of right hand  - Ortho Referral - In [facility]    Meds This Visit:  Requested Prescriptions      No prescriptions requested or ordered in this encounter       Imaging & Referrals:  ORTHOPEDIC - INTERNAL    Note written by scribe.  Scribe has written note according to my dictation.  Reviewed scribe note.  Changed as appropriate.         [1]   Current Outpatient Medications   Medication Sig Dispense Refill    HYDROXYCHLOROQUINE 200 MG Oral Tab TAKE 1 TABLET TWICE A  tablet 3    cefuroxime 250 MG Oral Tab Take 1 tablet (250 mg total) by mouth 2 (two) times daily. 20 tablet 0    predniSONE 5 MG Oral Tab Take 1 tablet (5 mg total) by mouth daily. 7 tablet 0    tamoxifen 20 MG Oral Tab Take 1 tablet (20 mg total) by mouth daily. 90 tablet 2    albuterol 108 (90 Base) MCG/ACT Inhalation Aero Soln Inhale 2 puffs into the lungs every 6 (six) hours as needed for Wheezing. 3 each 0    Meloxicam 7.5 MG Oral Tab Take 1 tablet (7.5 mg total) by mouth 2 (two) times daily as needed for Pain. 180 tablet 0    MONTELUKAST 10 MG Oral Tab TAKE 1 TABLET NIGHTLY 90 tablet 3    cetirizine 10 MG Oral Tab Take 1 tablet (10 mg total) by mouth daily. 90 tablet 0    Misc Natural Products (TURMERIC CURCUMIN) Oral Cap Take 1,500 mg by mouth in the morning.      Omega-3 Fatty Acids (FISH OIL OR) Take 1,280 mg by mouth in the morning.      Calcium  Carbonate-Vitamin D (CALCIUM 600 + D OR) Take 1 tablet by mouth in the morning.      Multiple Vitamin (MULTIVITAMIN ADULT OR) Take by mouth.      fluticasone propionate 50 MCG/ACT Nasal Suspension 2 sprays by Nasal route nightly. (Patient taking differently: 2 sprays by Nasal route as needed.) 48 mL 1    B Complex Vitamins (VITAMIN B COMPLEX 100 IJ)       Ergocalciferol (VITAMIN D OR) Take 2,000 Units by mouth in the morning.      Probiotic Product (PROBIOTIC DAILY OR) Take by mouth.     [2]   Allergies  Allergen Reactions    Seasonal Runny nose and Coughing

## 2025-06-25 ENCOUNTER — OFFICE VISIT (OUTPATIENT)
Dept: INTERNAL MEDICINE CLINIC | Facility: CLINIC | Age: 60
End: 2025-06-25
Payer: COMMERCIAL

## 2025-06-25 VITALS
HEART RATE: 89 BPM | RESPIRATION RATE: 18 BRPM | WEIGHT: 174 LBS | DIASTOLIC BLOOD PRESSURE: 78 MMHG | HEIGHT: 70 IN | SYSTOLIC BLOOD PRESSURE: 120 MMHG | BODY MASS INDEX: 24.91 KG/M2

## 2025-06-25 DIAGNOSIS — E66.3 OVERWEIGHT (BMI 25.0-29.9): ICD-10-CM

## 2025-06-25 DIAGNOSIS — Z51.81 THERAPEUTIC DRUG MONITORING: Primary | ICD-10-CM

## 2025-06-25 PROCEDURE — 99214 OFFICE O/P EST MOD 30 MIN: CPT | Performed by: INTERNAL MEDICINE

## 2025-06-25 NOTE — PROGRESS NOTES
HISTORY OF PRESENT ILLNESS  Chief Complaint   Patient presents with    Weight Check     Up 3        Yahaira Agrawal is a 60 year old female here for follow up in medical weight loss program.   [facility] Follow Up    General Information  Nutrition Recall  Exercise     Sleep              History of Present Illness  Yahaira Agrawal is a 60 year old female with a history of cancer treatment and rheumatoid arthritis who presents for evaluation of weight management.    She has been experiencing ongoing challenges with weight management despite maintaining a healthy lifestyle, including regular exercise and a balanced diet. Previous trials with Lomaira were discontinued due to side effects such as heart palpitations, which resolved after stopping the medication. Heart scans were conducted to evaluate these symptoms.    She was also prescribed metformin but discontinued it after a short period due to concerns about potential side effects, especially given her history of cancer treatment and rheumatoid arthritis. She does not recall experiencing any side effects from metformin during the brief period she took it.    Her current diet includes overnight oats with oat milk, protein powder, Greek yogurt, and berries for breakfast; a salad with chicken and vinaigrette dressing for lunch; and a variety of foods for dinner, with occasional snacks like fruit or cottage cheese. She occasionally consumes red wine and pretzels but avoids more indulgent snacks.    She is looking forward to stopping tamoxifen at the end of the year and is curious about its potential impact on her weight. She maintains a journal tracking her weight since starting cancer treatment, noting minimal weight loss despite strict dietary measures and regular exercise.    She is frustrated that her efforts have not resulted in significant weight loss, especially compared to her , who has lost weight with less effort.    Wt Readings from Last 6 Encounters:    06/25/25 174 lb (78.9 kg)   06/03/25 174 lb (78.9 kg)   05/01/25 174 lb 9.6 oz (79.2 kg)   04/04/25 175 lb (79.4 kg)   03/05/25 171 lb 6.4 oz (77.7 kg)   02/24/25 175 lb (79.4 kg)              Breakfast Lunch Dinner Snacks Fluids   Reviewed           REVIEW OF SYSTEMS  GENERAL HEALTH: feels well otherwise, denied any fevers chills or night sweats   RESPIRATORY: denies shortness of breath   CARDIOVASCULAR: denies chest pain  GI: denies abdominal pain    EXAM  /78   Pulse 89   Resp 18   Ht 5' 10\" (1.778 m)   Wt 174 lb (78.9 kg)   LMP 07/22/2020   BMI 24.97 kg/m²   GENERAL: well developed, well nourished,in no apparent distress, A/O x3  SKIN: no rashes,no suspicious lesions  HEENT: atraumatic, normocephalic, OP-clear, PERRL  NECK: supple,no adenopathy  LUNGS: clear to auscultation bilaterally   CARDIO: RRR without murmur  GI: good BS's,NT/ND, no masses or HSM  EXTREMITIES: no cyanosis, no clubbing, no edema    Lab Results   Component Value Date    WBC 6.1 02/22/2025    RBC 4.08 02/22/2025    HGB 12.6 02/22/2025    HCT 38.0 02/22/2025    MCV 93.1 02/22/2025    MCH 30.9 02/22/2025    MCHC 33.2 02/22/2025    RDW 12.4 02/22/2025    .0 02/22/2025     Lab Results   Component Value Date    GLU 93 02/22/2025    BUN 19 02/22/2025    BUNCREA NOT APPLICABLE 12/02/2022    CREATSERUM 0.79 02/22/2025    ANIONGAP 5 02/22/2025    GFRNAA 90 12/21/2021    GFRAA 105 12/21/2021    CA 9.1 02/22/2025    OSMOCALC 294 02/22/2025    ALKPHO 42 (L) 02/22/2025    AST 23 02/22/2025    ALT 11 02/22/2025    BILT 0.4 02/22/2025    TP 6.6 02/22/2025    ALB 4.1 02/22/2025    GLOBULIN 2.5 02/22/2025    AGRATIO 1.3 12/02/2022     02/22/2025    K 4.4 02/22/2025     02/22/2025    CO2 30.0 02/22/2025     Lab Results   Component Value Date     02/22/2025    A1C 5.6 02/22/2025     Lab Results   Component Value Date    CHOLEST 150 02/22/2025    TRIG (L) 02/22/2025      Comment:      <45    HDL 86 (A) 02/22/2025    LDL   02/22/2025      Comment:      NA    VLDL 7 02/22/2025    TCHDLRATIO 2.2 12/02/2022    NONHDLC 97 02/22/2025    CHOLHDLRATIO 2 02/22/2025     Lab Results   Component Value Date    T4F 1.1 02/22/2025    TSH 2.321 02/22/2025    TSHT4 1.04 03/13/2020     Lab Results   Component Value Date    B12 541 02/22/2025    VITB12 412 12/02/2022     Lab Results   Component Value Date    VITD 77.1 02/22/2025       Medications Ordered Prior to Encounter[1]    ASSESSMENT  Analyzed weight data:  Weight Calculations  Initial Weight: 171 lbs  Initial Weight Date: 01/07/25  Today's Weight: 174 lbs  5% Goal: 8.55  10% Goal: 17.1  Total Weight Loss: -3 lbs  MBSAQIP Information Bariatric Seminar Date: 1/6/25 Patient type: Lifestyle     Initial Body Fat %: 32.2      Date of Initial Weight: 01/07/2025 Initial Weight: 171     Initial BMI: 24.4         Have any comorbidities improved since the last visit?   Hypertension DM 2 Dyslipidemia Osteoarthritis Sleep Apnea                    Diagnoses and all orders for this visit:    Therapeutic drug monitoring    Overweight (BMI 25.0-29.9)        PLAN  Assessment & Plan  Overweight  Total time spent on chart review, pre-charting, obtaining history, counseling, and educating, reviewing labs was 30 minutes.    Overweight with normal BMI. Previous Lomaira use increased heart rate, resolved after discontinuation. Minimal weight loss despite healthy lifestyle. Discussed weight management strategies: metformin's safety and GI side effects; GLP-1 receptor agonists' benefits and cancer risks; Contrave's alcohol interaction and emotional eating focus; Lomaira's metabolism support; intermittent fasting benefits.  - Consider resuming metformin, monitor GI side effects.  - Consider low-dose GLP-1 receptor agonist, caution for cancer risks.  - Consider intermittent fasting with 12 to 6 or 12 to 8 window.  - Consider resuming Lomaira at low dose, monitor heart rate.  - Contact office within 1-2 weeks if starting  Lomaira to report side effects.  - Consider Contrave, limit alcohol to 1-3 drinks per week.  - At time patient elected to trial intermittent fasting and / 4 mg lomaira    History of cancer treatment  Currently on tamoxifen, discontinuation expected by year-end. Discussed medication side effects impacting weight management and health.    Rheumatoid arthritis  No specific discussion of symptoms or treatment adjustments.    There are no Patient Instructions on file for this visit.    No follow-ups on file.    Patient verbalizes understanding.    Ju Clarke MD           [1]   Current Outpatient Medications on File Prior to Visit   Medication Sig Dispense Refill    HYDROXYCHLOROQUINE 200 MG Oral Tab TAKE 1 TABLET TWICE A  tablet 3    cefuroxime 250 MG Oral Tab Take 1 tablet (250 mg total) by mouth 2 (two) times daily. 20 tablet 0    predniSONE 5 MG Oral Tab Take 1 tablet (5 mg total) by mouth daily. 7 tablet 0    tamoxifen 20 MG Oral Tab Take 1 tablet (20 mg total) by mouth daily. 90 tablet 2    albuterol 108 (90 Base) MCG/ACT Inhalation Aero Soln Inhale 2 puffs into the lungs every 6 (six) hours as needed for Wheezing. 3 each 0    Meloxicam 7.5 MG Oral Tab Take 1 tablet (7.5 mg total) by mouth 2 (two) times daily as needed for Pain. 180 tablet 0    MONTELUKAST 10 MG Oral Tab TAKE 1 TABLET NIGHTLY 90 tablet 3    cetirizine 10 MG Oral Tab Take 1 tablet (10 mg total) by mouth daily. 90 tablet 0    Misc Natural Products (TURMERIC CURCUMIN) Oral Cap Take 1,500 mg by mouth in the morning.      Omega-3 Fatty Acids (FISH OIL OR) Take 1,280 mg by mouth in the morning.      Calcium Carbonate-Vitamin D (CALCIUM 600 + D OR) Take 1 tablet by mouth in the morning.      Multiple Vitamin (MULTIVITAMIN ADULT OR) Take by mouth.      fluticasone propionate 50 MCG/ACT Nasal Suspension 2 sprays by Nasal route nightly. (Patient taking differently: 2 sprays by Nasal route as needed.) 48 mL 1    B Complex Vitamins (VITAMIN B COMPLEX 100  IJ)       Ergocalciferol (VITAMIN D OR) Take 2,000 Units by mouth in the morning.      Probiotic Product (PROBIOTIC DAILY OR) Take by mouth.       No current facility-administered medications on file prior to visit.

## 2025-07-08 ENCOUNTER — PATIENT MESSAGE (OUTPATIENT)
Dept: INTERNAL MEDICINE CLINIC | Facility: CLINIC | Age: 60
End: 2025-07-08

## 2025-07-08 DIAGNOSIS — Z51.81 THERAPEUTIC DRUG MONITORING: Primary | ICD-10-CM

## 2025-07-08 DIAGNOSIS — E66.3 OVERWEIGHT (BMI 25.0-29.9): ICD-10-CM

## 2025-07-09 RX ORDER — PHENTERMINE HYDROCHLORIDE 8 MG/1
8 TABLET ORAL DAILY
Qty: 30 TABLET | Refills: 1 | Status: SHIPPED | OUTPATIENT
Start: 2025-07-09 | End: 2025-08-08

## 2025-07-15 ENCOUNTER — TELEPHONE (OUTPATIENT)
Facility: LOCATION | Age: 60
End: 2025-07-15

## 2025-07-15 RX ORDER — CEFUROXIME AXETIL 250 MG/1
250 TABLET ORAL 2 TIMES DAILY
Qty: 28 TABLET | Refills: 0 | Status: SHIPPED | OUTPATIENT
Start: 2025-07-15

## 2025-07-15 NOTE — TELEPHONE ENCOUNTER
Patient once again has sinusitis.  I will treat her with 2 weeks of Ceftin.  She is to see me if symptoms persist.

## 2025-08-18 ENCOUNTER — OFFICE VISIT (OUTPATIENT)
Dept: RHEUMATOLOGY | Facility: CLINIC | Age: 60
End: 2025-08-18

## 2025-08-18 VITALS
HEIGHT: 70 IN | SYSTOLIC BLOOD PRESSURE: 128 MMHG | OXYGEN SATURATION: 97 % | RESPIRATION RATE: 16 BRPM | TEMPERATURE: 98 F | DIASTOLIC BLOOD PRESSURE: 70 MMHG | BODY MASS INDEX: 25.2 KG/M2 | HEART RATE: 66 BPM | WEIGHT: 176 LBS

## 2025-08-18 DIAGNOSIS — M54.50 CHRONIC MIDLINE LOW BACK PAIN WITHOUT SCIATICA: ICD-10-CM

## 2025-08-18 DIAGNOSIS — M05.771 RHEUMATOID ARTHRITIS INVOLVING BOTH FEET WITH POSITIVE RHEUMATOID FACTOR (HCC): Primary | ICD-10-CM

## 2025-08-18 DIAGNOSIS — M05.772 RHEUMATOID ARTHRITIS INVOLVING BOTH FEET WITH POSITIVE RHEUMATOID FACTOR (HCC): Primary | ICD-10-CM

## 2025-08-18 DIAGNOSIS — Z85.3 HISTORY OF BREAST CANCER: ICD-10-CM

## 2025-08-18 DIAGNOSIS — G89.29 CHRONIC MIDLINE LOW BACK PAIN WITHOUT SCIATICA: ICD-10-CM

## 2025-08-18 DIAGNOSIS — Z79.899 LONG-TERM USE OF PLAQUENIL: ICD-10-CM

## 2025-08-18 DIAGNOSIS — M53.3 CHRONIC SI JOINT PAIN: ICD-10-CM

## 2025-08-18 DIAGNOSIS — G89.29 CHRONIC SI JOINT PAIN: ICD-10-CM

## 2025-08-18 PROCEDURE — 99215 OFFICE O/P EST HI 40 MIN: CPT | Performed by: INTERNAL MEDICINE

## 2025-08-18 RX ORDER — PHENTERMINE HYDROCHLORIDE 8 MG/1
4 TABLET ORAL DAILY
COMMUNITY

## 2025-08-27 ENCOUNTER — TELEPHONE (OUTPATIENT)
Facility: LOCATION | Age: 60
End: 2025-08-27

## 2025-08-28 ENCOUNTER — OFFICE VISIT (OUTPATIENT)
Dept: FAMILY MEDICINE CLINIC | Facility: CLINIC | Age: 60
End: 2025-08-28

## 2025-08-28 VITALS
RESPIRATION RATE: 16 BRPM | WEIGHT: 170 LBS | DIASTOLIC BLOOD PRESSURE: 68 MMHG | BODY MASS INDEX: 24.34 KG/M2 | SYSTOLIC BLOOD PRESSURE: 118 MMHG | OXYGEN SATURATION: 97 % | TEMPERATURE: 98 F | HEART RATE: 83 BPM | HEIGHT: 70 IN

## 2025-08-28 DIAGNOSIS — Z91.09 ENVIRONMENTAL ALLERGIES: ICD-10-CM

## 2025-08-28 DIAGNOSIS — M05.771 RHEUMATOID ARTHRITIS INVOLVING BOTH FEET WITH POSITIVE RHEUMATOID FACTOR (HCC): ICD-10-CM

## 2025-08-28 DIAGNOSIS — M72.0 DUPUYTREN CONTRACTURE: ICD-10-CM

## 2025-08-28 DIAGNOSIS — J32.0 CHRONIC MAXILLARY SINUSITIS: Primary | ICD-10-CM

## 2025-08-28 DIAGNOSIS — Z00.00 GENERAL MEDICAL EXAM: ICD-10-CM

## 2025-08-28 DIAGNOSIS — J34.3 NASAL TURBINATE HYPERTROPHY: ICD-10-CM

## 2025-08-28 DIAGNOSIS — D84.9 IMMUNOCOMPROMISED (HCC): ICD-10-CM

## 2025-08-28 DIAGNOSIS — M05.772 RHEUMATOID ARTHRITIS INVOLVING BOTH FEET WITH POSITIVE RHEUMATOID FACTOR (HCC): ICD-10-CM

## 2025-08-28 RX ORDER — CEFDINIR 300 MG/1
300 CAPSULE ORAL 2 TIMES DAILY
Qty: 20 CAPSULE | Refills: 0 | Status: SHIPPED | OUTPATIENT
Start: 2025-08-28 | End: 2025-09-07

## (undated) DIAGNOSIS — C50.211 MALIGNANT NEOPLASM OF UPPER-INNER QUADRANT OF RIGHT BREAST IN FEMALE, ESTROGEN RECEPTOR POSITIVE (HCC): Primary | ICD-10-CM

## (undated) DIAGNOSIS — Z17.0 MALIGNANT NEOPLASM OF UPPER-INNER QUADRANT OF RIGHT BREAST IN FEMALE, ESTROGEN RECEPTOR POSITIVE (HCC): Primary | ICD-10-CM

## (undated) DEVICE — SUTURE SILK 2-0 FS

## (undated) DEVICE — DRAPE TAPE: Brand: CONVERTORS

## (undated) DEVICE — Device: Brand: JELCO

## (undated) DEVICE — CAUTERY BLADE 2IN INS E1455

## (undated) DEVICE — GAUZE SPONGES,12 PLY: Brand: CURITY

## (undated) DEVICE — 6 ML SYRINGE LUER-LOCK TIP: Brand: MONOJECT

## (undated) DEVICE — MINOR GENERAL: Brand: MEDLINE INDUSTRIES, INC.

## (undated) DEVICE — NEEDLE 18G 1-1/2 BLUNT FILL

## (undated) DEVICE — SUTURE MONOCRYL 4-0 PS-2

## (undated) DEVICE — FLEXIBLE YANKAUER,MEDIUM TIP, NO VACUUM CONTROL: Brand: ARGYLE

## (undated) DEVICE — SOL  .9 1000ML BTL

## (undated) DEVICE — DRAPE PACK CHEST & U BAR

## (undated) DEVICE — SPONGE: SPECIALTY PEANUT XR 100/CS: Brand: MEDICAL ACTION INDUSTRIES

## (undated) DEVICE — COVER PRB NEOGUARD 30X2.6CM US

## (undated) DEVICE — SUTURE PDS II 3-0 Z683G

## (undated) DEVICE — HEX-LOCKING BLADE ELECTRODE: Brand: EDGE

## (undated) DEVICE — CLIP SM INTNL HMCLP TNTLM ESCP

## (undated) DEVICE — ENCORE® PERRY STYLE 42 PF SIZE 6.5, STERILE LATEX POWDER-FREE SURGICAL GLOVE: Brand: ENCORE

## (undated) DEVICE — 12 ML SYRINGE LUER-LOCK TIP: Brand: MONOJECT

## (undated) DEVICE — SUTURE VICRYL 3-0 SH

## (undated) DEVICE — ADHESIVE MASTISOL 2/3CC VL

## (undated) DEVICE — DRAPE SHEET LG

## (undated) DEVICE — 3M™ STERI-STRIP™ REINFORCED ADHESIVE SKIN CLOSURES, R1547, 1/2 IN X 4 IN (12 MM X 100 MM), 6 STRIPS/ENVELOPE: Brand: 3M™ STERI-STRIP™

## (undated) DEVICE — STANDARD HYPODERMIC NEEDLE,POLYPROPYLENE HUB: Brand: MONOJECT

## (undated) DEVICE — CONTAINER SPEC STR 4OZ GRY LID

## (undated) DEVICE — CLIP MED INTNL HMCLP TNTLM

## (undated) NOTE — LETTER
Printed: 9/10/2020    Patient Name: Violet Ramires  : 1965   Medical Record #: [id number]    Consent to [address], understand that I have been diagnosed with breast cancer.     I understand that the treatmen I have had the chance to ask questions about this treatment, and my questions have been answered to my satisfaction. I understand that I can contact my oncologist or my Cancer Care Team at any time if I have questions, by calling [phone].    Star

## (undated) NOTE — LETTER
ASTHMA ACTION PLAN for Carmel Desir     : 1965     Date: 2018  Provider:  Monique Dudley DO  Phone for doctor or clinic: [address], [place] [address] [phone]    ACT Score: 24  ACT Score: and a copy of the plan was given to the patient/caregiver. [] Asthma Action Plan reviewed with patient (and caregiver if necessary) on the phone and mailed copy to patient or submitted via [address]      Signatures:  Provider  Yas Rollins DO   Patient C

## (undated) NOTE — LETTER
Linette Mc[address]innis [address]  [facility] [address], [place], [place]  [address]  INFORMED CONSENT FOR TRANSFUSION OF BLOOD OR BLOOD PRODUCTS  My physician has informed me of the nature, purpose, benefits and risks of transfusion for blood and blood components that ______________________________________________  (Signature of Patient)                                                            (Responsible party in case of Minor,

## (undated) NOTE — LETTER
ASTHMA ACTION PLAN for Bakari Eason     : 1965     Date: 2018  Provider:  Stephany Baltazar DO  Phone for doctor or clinic: [address] [facility] [address] [phone]       ACT Score:  24    You and a copy of the plan was given to the patient/caregiver. [] Asthma Action Plan reviewed with patient (and caregiver if necessary) on the phone and mailed copy to patient or submitted via [address]      Signatures:  Provider  Corey Rowe, DO   Patient C

## (undated) NOTE — LETTER
[facility]  Administration of Anesthesia  1.  Shayna Santiago, or _________________________________ acting on her behalf, (Patient) (Dependent/Representative) request to receive anesthesia for my pending procedure/operation/treatment infections, high spinal block, spinal bleeding, seizure, cardiac arrest and death. 7. AWARENESS: I understand that it is possible (but unlikely) to have explicit memory of events from the operating room while under general anesthesia.   8. ELECTROCONVULSIV unconscious pt /Relationship    My signature below affirms that prior to the time of the procedure, I have explained to the patient and/or his/her guardian, the risks and benefits of undergoing anesthesia, as well as any reasonable alternatives.     _______

## (undated) NOTE — LETTER
[address]  [address] [place], [place]      Authorization for Surgical Operation and Procedure     Date:___________                                                                                                         Time:_______ and/or blood products. The following are some, but not all, of the potential risks that can occur: fever and allergic reactions, hemolytic reactions, transmission of diseases such as Hepatitis, AIDS and Cytomegalovirus (CMV) and fluid overload.   In the ev (or a person authorized to consent on my behalf). The surgeon or my attending physician will determine when the applicable recovery period ends for purposes of reinstating the DNAR order.   10. Patients having a sterilization procedure: I understand that if procedure/surgery, I have disclosed this and had a discussion with my patient.     _______________________________________________________________ _____________________________  [place] [employer] of Physician)

## (undated) NOTE — LETTER
January 20, 2019        [address] [id number]      Dear José Neither:    We have attempted to contact you by phone with no success.  In an effort to provide quality patient care we are reaching out to you to contact the off

## (undated) NOTE — LETTER
ASTHMA ACTION PLAN for Yahaira Agrawal     : 1965     Date: 25  Doctor:  Mariana Fabian DO  Phone for doctor or clinic: [facility], [address] [place] [address] [phone]      ACT Score: 25    ACT Goal: 20 or greater    Call your provider if you require your rescue/quick reliever medication more than 2-3 times in a 24 hour period.    If you require your rescue inhaler/medication more than 2-3 times weekly, your asthma may not be under proper control and you should seek medical attention.    *Quick Relievers are Xopenex and Albuterol*    You can use the colors of a traffic light to help learn about your asthma medicines.  Therapy Range       1. Green - Go! % of Personal Best Peak Flow   Use controller medicine.   Breathing is good  No cough or wheeze  Can work and play Medicine How much to take When to take it    Medications       Leukotriene Modulators Instructions     MONTELUKAST 10 MG Oral Tab TAKE 1 TABLET NIGHTLY       Sympathomimetics Instructions                    2. Yellow - Caution. 50-79% Personal Best Peak Flow  Use reliever medicine to keep an asthma attack from getting bad.   Cough  Quick Relievers  Wheezing  Tight Chest  Wake up at night Medicine How much to take When to take it    If symptoms are not improving in 24-48 hrs, call office for further instructions  Medications       Leukotriene Modulators Instructions     MONTELUKAST 10 MG Oral Tab TAKE 1 TABLET NIGHTLY       Sympathomimetics Instructions     albuterol 108 (90 Base) MCG/ACT Inhalation Aero Soln Inhale 2 puffs into the lungs every 6 (six) hours as needed for Wheezing.                    3. Red - Stop! Danger! <50% Personal Best Peak Flow  Continue Controller Medications But ADD:   Medicine not helping  Breathing is hard and fast  Nose opens wide  Can't walk  Ribs show  Can't talk well Medicine How much to take When to take it    If your symptoms do not  improve in ONE hour -  go to the emergency room or call 911 immediately! If symptoms improve, call office for appointment immediately.    Albuterol inhaler 2 puffs every 20 minutes for three treatments       Don't forget:  Rinse mouth after using inhaler  Use spacer for inhaler  Remember to get your Flu vaccine every fall!    [x] Asthma Action Plan reviewed with the caregiver and patient, and a copy of the plan was given to the patient/caregiver.   [] Asthma Action Plan reviewed with the caregiver and patient on the phone, and copy mailed to patient/caregiver or sent via [employer].     Signatures:   Provider  Mariana Fabian,  Patient  Yahaira Agrawal Caretaker

## (undated) NOTE — LETTER
ASTHMA ACTION PLAN for Tenisha Valenzuela     : 1965     Date: 3/13/2020  Provider:  Osorio Marion DO  Phone for doctor or clinic: [facility] [facility], [facility] [address]  [phone]    ACT Score: 23      You ca [] Asthma Action Plan reviewed with patient (and caregiver if necessary) on the phone and mailed copy to patient or submitted via [employer].      Signatures:  Provider  Authur Gosselin, DO   Patient Caretaker

## (undated) NOTE — LETTER
[address] [place] [address]      Authorization for Surgical Operation and Procedure     Date:___________                                                                                                         Time:_______ be subject to ill effects as a result of receiving a blood transfusion and/or blood products.   The following are some, but not all, of the potential risks that can occur: fever and allergic reactions, hemolytic reactions, transmission of diseases such as H and during the recovery period unless otherwise explicitly stated by me (or a person authorized to consent on my behalf).  The surgeon or my attending physician will determine when the applicable recovery period ends for purposes of reinstating the DNAR ord or implant, or other significant relationship used in this procedure/surgery, I have disclosed this and had a discussion with my patient.     _______________________________________________________________ _____________________________  [facility] of Physi

## (undated) NOTE — LETTER
ASTHMA ACTION PLAN for Caesar Ervin Agarwal     : 1965     Date: 04/15/21  Doctor:  Johanna Cowden, DO  Phone for doctor or clinic: [facility], [facility], [address] [facility]   [facility], [address]  [phone]      A Soln    Inhale 2 puffs into the lungs every 4 (four) hours as needed for Wheezing or Shortness of Breath (If no relief go to ER). 3. Red - Stop!  Danger! <50% Personal Best Peak Flow  Continue Controller Medications But ADD:   Medicine not

## (undated) NOTE — ED AVS SNAPSHOT
Tita Saez   MRN: [id number]    Department:  [facility] Emergency Department   Date of Visit:  11/4/2019           Disclosure     Insurance plans vary and the physician(s) referred by the ER may not be covered by your plan.  Please contact tell this physician (or your personal doctor if your instructions are to return to your personal doctor) about any new or lasting problems. The primary care or specialist physician will see patients referred from the [facility] Emergency Department.  Kenadl Jalloh